# Patient Record
Sex: FEMALE | Race: BLACK OR AFRICAN AMERICAN | NOT HISPANIC OR LATINO
[De-identification: names, ages, dates, MRNs, and addresses within clinical notes are randomized per-mention and may not be internally consistent; named-entity substitution may affect disease eponyms.]

---

## 2017-09-15 ENCOUNTER — RESULT REVIEW (OUTPATIENT)
Age: 57
End: 2017-09-15

## 2018-01-09 ENCOUNTER — APPOINTMENT (OUTPATIENT)
Dept: GYNECOLOGIC ONCOLOGY | Facility: CLINIC | Age: 58
End: 2018-01-09
Payer: COMMERCIAL

## 2018-01-09 VITALS
BODY MASS INDEX: 47.09 KG/M2 | HEART RATE: 100 BPM | HEIGHT: 66 IN | DIASTOLIC BLOOD PRESSURE: 110 MMHG | WEIGHT: 293 LBS | SYSTOLIC BLOOD PRESSURE: 223 MMHG

## 2018-01-09 PROCEDURE — 58100 BIOPSY OF UTERUS LINING: CPT

## 2018-01-16 LAB — CORE LAB BIOPSY: NORMAL

## 2018-01-20 ENCOUNTER — APPOINTMENT (OUTPATIENT)
Dept: ULTRASOUND IMAGING | Facility: IMAGING CENTER | Age: 58
End: 2018-01-20
Payer: COMMERCIAL

## 2018-01-20 ENCOUNTER — OUTPATIENT (OUTPATIENT)
Dept: OUTPATIENT SERVICES | Facility: HOSPITAL | Age: 58
LOS: 1 days | End: 2018-01-20
Payer: COMMERCIAL

## 2018-01-20 DIAGNOSIS — Z98.89 OTHER SPECIFIED POSTPROCEDURAL STATES: Chronic | ICD-10-CM

## 2018-01-20 DIAGNOSIS — N85.02 ENDOMETRIAL INTRAEPITHELIAL NEOPLASIA [EIN]: ICD-10-CM

## 2018-01-20 PROCEDURE — 76830 TRANSVAGINAL US NON-OB: CPT

## 2018-01-20 PROCEDURE — 76856 US EXAM PELVIC COMPLETE: CPT

## 2018-01-20 PROCEDURE — 76856 US EXAM PELVIC COMPLETE: CPT | Mod: 26

## 2018-01-20 PROCEDURE — 76830 TRANSVAGINAL US NON-OB: CPT | Mod: 26

## 2019-04-30 ENCOUNTER — APPOINTMENT (OUTPATIENT)
Dept: PULMONOLOGY | Facility: CLINIC | Age: 59
End: 2019-04-30
Payer: COMMERCIAL

## 2019-04-30 VITALS
HEART RATE: 66 BPM | OXYGEN SATURATION: 97 % | DIASTOLIC BLOOD PRESSURE: 100 MMHG | BODY MASS INDEX: 47.09 KG/M2 | RESPIRATION RATE: 18 BRPM | WEIGHT: 293 LBS | SYSTOLIC BLOOD PRESSURE: 197 MMHG | HEIGHT: 66 IN | TEMPERATURE: 97 F

## 2019-04-30 DIAGNOSIS — R40.0 SOMNOLENCE: ICD-10-CM

## 2019-04-30 DIAGNOSIS — E66.01 MORBID (SEVERE) OBESITY DUE TO EXCESS CALORIES: ICD-10-CM

## 2019-04-30 PROCEDURE — 99204 OFFICE O/P NEW MOD 45 MIN: CPT

## 2019-04-30 NOTE — REVIEW OF SYSTEMS
[EDS: ESS=____] : daytime somnolence: ESS=[unfilled] [Fatigue] : fatigue [Recent Wt Loss (___ Lbs)] : recent [unfilled] ~Ulb weight loss [Snoring] : no snoring [Witnessed Apneas] : no witnessed apnea [A.M. Dry Mouth] : a.m. dry mouth [Obesity] : obesity [Diabetes] : diabetes  [A.M. Headache] : no headache present upon awakening [Leg Dysesthesias] : no leg dysesthesias [Sleep Paralysis] : no sleep paralysis [Hypnogogic Hallucinations] : no hypnogogic hallucinations [Hypnopompic Hallucinations] : no hypnopompic hallucinations [Heartburn] : no heartburn [Nocturia] : nocturia [Arthralgias] : arthralgias [Depression] : depression [Anxious] : anxious [Difficulty Initiating Sleep] : difficulty falling asleep [Difficulty Maintaining Sleep] : difficulty maintaining sleep [Lower Extremity Discomfort] : no lower extremity discomfort [Irresistible urge to move legs] : no irresistible urge to move legs because of lower extremity discomfort [LE discomfort relieved by movement] : lower extremity discomfort not relieved by movement [Late day/ Evening symptoms] : no late day/evening symptoms [Sleep Disturbances due to LE symptoms] : ~T no sleep disturbances due to lower extremity symptoms [Unusual Sleep Behavior] : no unusual sleep behavior [Hypersomnolence] : not sleeping much more than usual [Cataplexy] :  no cataplexy [Negative] : Cardiovascular

## 2019-04-30 NOTE — PHYSICAL EXAM
[Normal Appearance] : normal appearance [General Appearance - In No Acute Distress] : no acute distress [Normal Conjunctiva] : the conjunctiva exhibited no abnormalities [Normal Oropharynx] : normal oropharynx [Neck Appearance] : the appearance of the neck was normal [Jugular Venous Distention Increased] : there was no jugular-venous distention [] : no respiratory distress [Exaggerated Use Of Accessory Muscles For Inspiration] : no accessory muscle use [Abnormal Walk] : normal gait [Involuntary Movements] : no involuntary movements were seen [Nail Clubbing] : no clubbing of the fingernails [Cyanosis, Localized] : no localized cyanosis [Skin Color & Pigmentation] : normal skin color and pigmentation [No Focal Deficits] : no focal deficits [Oriented To Time, Place, And Person] : oriented to person, place, and time [Affect] : the affect was normal [Mood] : the mood was normal

## 2019-04-30 NOTE — HISTORY OF PRESENT ILLNESS
[Frequent Nocturnal Awakening] : frequent nocturnal awakening [Unintentional Sleep While Inactive] : unintentional sleep while inactive [Awakes Unrefreshed] : awakening unrefreshed [DIS] : DIS [DMS] : DMS [To Bed: ___] : ~he/she~ goes to bed at [unfilled] [Arises: ___] : arises at [unfilled] [Sleep Onset Latency: ___ minutes] : sleep onset latency of [unfilled] minutes reported [Nocturnal Awakenings: ___] : ~he/she~ typically has [unfilled] nocturnal awakenings [Daytime Sleep: ___] : daytime sleep: [unfilled] [FreeTextEntry1] : 58 year old woman here for evaluation of sleep problems.\par \par The patient states that she has had problems sleeping all of her life. 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 She states that she sleeps for about 4-5 hours a night and no more than 3 hours at a stretch. She was refered for evaluation of her sleep by her PMD as she has been having trouble controlling her BP. [Snoring] : no snoring [Witnessed Apneas] : no witnessed sleep apnea [Daytime Somnolence] : no daytime somnolence [Unintentional Sleep while Active] : no unintentional sleep while active [Awakes with Headache] : no headache upon awakening [Awakening With Dry Mouth] : no dry mouth upon awakening [Recent  Weight Gain] : no recent weight gain

## 2019-05-17 ENCOUNTER — APPOINTMENT (OUTPATIENT)
Dept: OBGYN | Facility: CLINIC | Age: 59
End: 2019-05-17

## 2019-05-17 ENCOUNTER — APPOINTMENT (OUTPATIENT)
Dept: GYNECOLOGIC ONCOLOGY | Facility: CLINIC | Age: 59
End: 2019-05-17
Payer: COMMERCIAL

## 2019-05-17 VITALS — HEIGHT: 66 IN | BODY MASS INDEX: 47.09 KG/M2 | WEIGHT: 293 LBS

## 2019-05-17 PROCEDURE — 58100 BIOPSY OF UTERUS LINING: CPT

## 2019-05-17 RX ORDER — CLONIDINE 0.3 MG/24H
0.3 PATCH, EXTENDED RELEASE TRANSDERMAL
Qty: 4 | Refills: 0 | Status: ACTIVE | COMMUNITY
Start: 2019-01-07

## 2019-05-17 RX ORDER — CHLORTHALIDONE 50 MG/1
50 TABLET ORAL
Qty: 30 | Refills: 0 | Status: ACTIVE | COMMUNITY
Start: 2018-11-05

## 2019-05-28 LAB — CORE LAB BIOPSY: NORMAL

## 2019-06-27 ENCOUNTER — OUTPATIENT (OUTPATIENT)
Dept: OUTPATIENT SERVICES | Facility: HOSPITAL | Age: 59
LOS: 1 days | End: 2019-06-27
Payer: COMMERCIAL

## 2019-06-27 ENCOUNTER — APPOINTMENT (OUTPATIENT)
Dept: SLEEP CENTER | Facility: CLINIC | Age: 59
End: 2019-06-27
Payer: COMMERCIAL

## 2019-06-27 ENCOUNTER — RESULT REVIEW (OUTPATIENT)
Age: 59
End: 2019-06-27

## 2019-06-27 DIAGNOSIS — Z98.89 OTHER SPECIFIED POSTPROCEDURAL STATES: Chronic | ICD-10-CM

## 2019-06-27 PROCEDURE — 95806 SLEEP STUDY UNATT&RESP EFFT: CPT

## 2019-06-27 PROCEDURE — 95806 SLEEP STUDY UNATT&RESP EFFT: CPT | Mod: 26

## 2019-07-02 DIAGNOSIS — G47.33 OBSTRUCTIVE SLEEP APNEA (ADULT) (PEDIATRIC): ICD-10-CM

## 2019-08-26 ENCOUNTER — APPOINTMENT (OUTPATIENT)
Dept: VASCULAR SURGERY | Facility: CLINIC | Age: 59
End: 2019-08-26

## 2019-10-07 ENCOUNTER — APPOINTMENT (OUTPATIENT)
Dept: VASCULAR SURGERY | Facility: CLINIC | Age: 59
End: 2019-10-07

## 2020-03-13 ENCOUNTER — APPOINTMENT (OUTPATIENT)
Dept: ORTHOPEDIC SURGERY | Facility: CLINIC | Age: 60
End: 2020-03-13

## 2020-03-18 ENCOUNTER — APPOINTMENT (OUTPATIENT)
Dept: ORTHOPEDIC SURGERY | Facility: CLINIC | Age: 60
End: 2020-03-18
Payer: COMMERCIAL

## 2020-03-18 VITALS — HEIGHT: 66 IN | BODY MASS INDEX: 47.09 KG/M2 | WEIGHT: 293 LBS

## 2020-03-18 DIAGNOSIS — M16.11 UNILATERAL PRIMARY OSTEOARTHRITIS, RIGHT HIP: ICD-10-CM

## 2020-03-18 PROCEDURE — 73502 X-RAY EXAM HIP UNI 2-3 VIEWS: CPT | Mod: RT

## 2020-03-18 PROCEDURE — 99204 OFFICE O/P NEW MOD 45 MIN: CPT

## 2020-03-18 RX ORDER — DICLOFENAC SODIUM 50 MG/1
50 TABLET, DELAYED RELEASE ORAL
Qty: 60 | Refills: 1 | Status: ACTIVE | COMMUNITY
Start: 2020-03-18 | End: 1900-01-01

## 2020-03-18 NOTE — PHYSICAL EXAM
[de-identified] : Physical Examination\par General: well nourished, in no acute distress, alert and oriented to person, place and time\par Psychiatric: normal mood and affect, no abnormal movements or speech patterns\par Eyes: vision intact - glasses\par Throat: no thyromegaly\par Lymph: no enlarged nodes, no lymphedema in extremity\par Respiratory: no wheezing, no shortness of breath with ambulation\par Cardiac: no cardiac leg swelling, 2+ peripheral pulses\par Neurology: normal gross sensation in extremities to light touch\par Abdomen: soft, non-tender, tympanic, no masses\par \par Musculoskeletal Examination\par Ambulation	+ antalgic gait, - assistive devices\par Significantly limited examination secondary to body habitus\par Hip			Right			Left\par General\par      Swelling/Deformity	normal			normal	\par      Skin			normal			normal\par      Erythema		-			-\par      Standing Alignment	neutral			neutral\par Range of Motion\par      Flexion		90			90\par      Abduction		10			10\par      Flex ER		15			25\par      Flex IR		5			5\par      Knee        		0 - 130			0 - 130\par Provocative Exam\par      Log Roll		+			-\par      Heel Strike		+			-\par      Fig 4			-			-\par      SLR			-			-\par Palpation\par      Public Symphysis	-			-\par      Groin   	 	-			-\par      Greater Trochanter	-			-\par      Piriformis		-			-\par      SI Joint		-			-\par Strength Examination/Atrophy\par      Hip Flexor		5+			5+\par      Quadriceps		5+			5+\par      Hamstring		5+			5+\par Sensation\par      Deep Peroneal        	normal			normal\par      Superficial Peroneal 	normal			normal\par      Sural  	 	normal			normal\par      Posterior Tibial        	normal			normal\par      Saphenous		normal			normal\par Pulse\par      DP			2+			2+\par  [de-identified] : 2 views of the affected RIGHT hip (AP and Frog Lateral)\par were ordered, taken and evaluated by myself today and \par demonstrate:\par [normal] acetabular morphology\par [normal] femoral head neck morphology\par Small acetabular osteophytes \par Mild to moderate medial asymmetric joint space loss\par [Spherical] femoral head with cyst

## 2020-03-18 NOTE — HISTORY OF PRESENT ILLNESS
[de-identified] : CC Right hip\par \par HPI 58 yo female presents with chronic onset of many months of activity related pain in the groin Right hip [without Injury]. The pain is worse, and rated a 6 out of 10, described as throbbing, [without radiation]. Rest makes the pain better and walking standing stairs makes the pain worse. The patient reports associated symptoms of pain. The patient - similar pain previously .\par \par \par Previous treatments include:\par Activity Modification	-\par Ice/Compression 	-\par NSAIDs  		-\par Physical Therapy 	-\par Cortisone Injection	-\par Surgery  		-\par \par Review of Systems is positive for the above musculoskeletal symptoms and is otherwise non-contributory for general, constitutional, psychiatric, neurologic, HEENT, cardiac, respiratory, gastrointestinal, reproductive, lymphatic, and dermatologic complaints.\par \par Billya

## 2020-03-18 NOTE — DISCUSSION/SUMMARY
[de-identified] : Right hip osteoarthritis\par \par The patient and I discussed the causes and progression of degenerative joint disease of the hip. Models, diagrams and drawings were used in the discussion. Treatment can include conservative non-operative management and surgical options. Conservative management includes weight loss, activity modification, physical therapy to improve motion and strength in the muscles around the hip and the body's core, PO NSAIDs, corticosteroid intra-articular injections. If the patient fails to improve with non-operative management, surgical management is possible. Depending upon the patient's age, BMI, activity level, degree and location of arthrosis different surgical options are possible including total joint arthroplasty.\par \par Physical therapy was prescribed for hip ROM exercises, strengthening exercises, hip abductor strengthening, lumbar core strengthening, modalities PRN, home exercises\par \par The patient was prescribed Diclofenac PO non-steroidal anti-inflammatory medication. 50mg tablets twice daily to be taken for at least 1-2 weeks in a row and then PRN afterwards. Risks and benefits were discussed and include but not limited to renal damage and GI ulceration and bleeding.  They were advised to take with food to limit stomach upset as well as warned to stop the medication if worsening gastric pain or dizziness or other side effects. Also to immediately stop the medication and seek appropriate medical attention if any severe stomach ache, gastritis, black/red vomit, black/red stools or any other medical concern.\par \par no csi due to dm status\par \par The patient verifies their understanding the the visit, diagnosis and plan. They agree with the treatment plan and will contact the office with any questions or problems.\par \par Follow up\par PRN

## 2020-08-03 PROBLEM — Z09 FOLLOW UP: Status: ACTIVE | Noted: 2020-08-03

## 2020-08-04 ENCOUNTER — APPOINTMENT (OUTPATIENT)
Dept: GYNECOLOGIC ONCOLOGY | Facility: CLINIC | Age: 60
End: 2020-08-04
Payer: COMMERCIAL

## 2020-08-04 DIAGNOSIS — N85.02 ENDOMETRIAL INTRAEPITHELIAL NEOPLASIA [EIN]: ICD-10-CM

## 2020-08-04 DIAGNOSIS — N85.01 BENIGN ENDOMETRIAL HYPERPLASIA: ICD-10-CM

## 2020-08-04 DIAGNOSIS — Z09 ENCOUNTER FOR FOLLOW-UP EXAMINATION AFTER COMPLETED TREATMENT FOR CONDITIONS OTHER THAN MALIGNANT NEOPLASM: ICD-10-CM

## 2020-09-15 ENCOUNTER — APPOINTMENT (OUTPATIENT)
Dept: GYNECOLOGIC ONCOLOGY | Facility: CLINIC | Age: 60
End: 2020-09-15
Payer: COMMERCIAL

## 2020-09-15 VITALS — DIASTOLIC BLOOD PRESSURE: 148 MMHG | SYSTOLIC BLOOD PRESSURE: 223 MMHG | HEIGHT: 66 IN | HEART RATE: 102 BPM

## 2020-09-15 PROCEDURE — XXXXX: CPT

## 2020-09-28 LAB — CORE LAB BIOPSY: NORMAL

## 2021-05-16 ENCOUNTER — EMERGENCY (EMERGENCY)
Facility: HOSPITAL | Age: 61
LOS: 1 days | Discharge: HOME | End: 2021-05-16
Attending: STUDENT IN AN ORGANIZED HEALTH CARE EDUCATION/TRAINING PROGRAM | Admitting: STUDENT IN AN ORGANIZED HEALTH CARE EDUCATION/TRAINING PROGRAM
Payer: COMMERCIAL

## 2021-05-16 ENCOUNTER — INPATIENT (INPATIENT)
Facility: HOSPITAL | Age: 61
LOS: 15 days | Discharge: NURSING, REHAB OR LONG-TERM CARE | End: 2021-06-01
Attending: INTERNAL MEDICINE | Admitting: INTERNAL MEDICINE
Payer: COMMERCIAL

## 2021-05-16 VITALS
TEMPERATURE: 97 F | RESPIRATION RATE: 18 BRPM | SYSTOLIC BLOOD PRESSURE: 217 MMHG | HEART RATE: 88 BPM | OXYGEN SATURATION: 97 % | DIASTOLIC BLOOD PRESSURE: 107 MMHG | HEIGHT: 66 IN

## 2021-05-16 VITALS
SYSTOLIC BLOOD PRESSURE: 257 MMHG | HEIGHT: 66 IN | OXYGEN SATURATION: 99 % | RESPIRATION RATE: 14 BRPM | DIASTOLIC BLOOD PRESSURE: 129 MMHG | HEART RATE: 92 BPM | TEMPERATURE: 98 F

## 2021-05-16 VITALS
HEART RATE: 82 BPM | OXYGEN SATURATION: 99 % | DIASTOLIC BLOOD PRESSURE: 98 MMHG | SYSTOLIC BLOOD PRESSURE: 213 MMHG | RESPIRATION RATE: 16 BRPM

## 2021-05-16 DIAGNOSIS — Z98.89 OTHER SPECIFIED POSTPROCEDURAL STATES: Chronic | ICD-10-CM

## 2021-05-16 LAB
ALBUMIN SERPL ELPH-MCNC: 4 G/DL — SIGNIFICANT CHANGE UP (ref 3.3–5)
ALP SERPL-CCNC: 69 U/L — SIGNIFICANT CHANGE UP (ref 40–120)
ALT FLD-CCNC: 12 U/L — SIGNIFICANT CHANGE UP (ref 4–33)
ANION GAP SERPL CALC-SCNC: 10 MMOL/L — SIGNIFICANT CHANGE UP (ref 7–14)
APTT BLD: 37.4 SEC — HIGH (ref 27–36.3)
AST SERPL-CCNC: 18 U/L — SIGNIFICANT CHANGE UP (ref 4–32)
BASOPHILS # BLD AUTO: 0.05 K/UL — SIGNIFICANT CHANGE UP (ref 0–0.2)
BASOPHILS NFR BLD AUTO: 0.8 % — SIGNIFICANT CHANGE UP (ref 0–2)
BILIRUB SERPL-MCNC: 0.8 MG/DL — SIGNIFICANT CHANGE UP (ref 0.2–1.2)
BLOOD GAS VENOUS COMPREHENSIVE RESULT: SIGNIFICANT CHANGE UP
BUN SERPL-MCNC: 22 MG/DL — SIGNIFICANT CHANGE UP (ref 7–23)
CALCIUM SERPL-MCNC: 10 MG/DL — SIGNIFICANT CHANGE UP (ref 8.4–10.5)
CHLORIDE SERPL-SCNC: 103 MMOL/L — SIGNIFICANT CHANGE UP (ref 98–107)
CO2 SERPL-SCNC: 26 MMOL/L — SIGNIFICANT CHANGE UP (ref 22–31)
CREAT SERPL-MCNC: 1.21 MG/DL — SIGNIFICANT CHANGE UP (ref 0.5–1.3)
EOSINOPHIL # BLD AUTO: 0.12 K/UL — SIGNIFICANT CHANGE UP (ref 0–0.5)
EOSINOPHIL NFR BLD AUTO: 1.8 % — SIGNIFICANT CHANGE UP (ref 0–6)
GLUCOSE SERPL-MCNC: 129 MG/DL — HIGH (ref 70–99)
HCT VFR BLD CALC: 44 % — SIGNIFICANT CHANGE UP (ref 34.5–45)
HGB BLD-MCNC: 14.3 G/DL — SIGNIFICANT CHANGE UP (ref 11.5–15.5)
IANC: 4.46 K/UL — SIGNIFICANT CHANGE UP (ref 1.5–8.5)
IMM GRANULOCYTES NFR BLD AUTO: 0.3 % — SIGNIFICANT CHANGE UP (ref 0–1.5)
INR BLD: 1.13 RATIO — SIGNIFICANT CHANGE UP (ref 0.88–1.16)
LYMPHOCYTES # BLD AUTO: 1.45 K/UL — SIGNIFICANT CHANGE UP (ref 1–3.3)
LYMPHOCYTES # BLD AUTO: 22.1 % — SIGNIFICANT CHANGE UP (ref 13–44)
MCHC RBC-ENTMCNC: 28 PG — SIGNIFICANT CHANGE UP (ref 27–34)
MCHC RBC-ENTMCNC: 32.5 GM/DL — SIGNIFICANT CHANGE UP (ref 32–36)
MCV RBC AUTO: 86.1 FL — SIGNIFICANT CHANGE UP (ref 80–100)
MONOCYTES # BLD AUTO: 0.45 K/UL — SIGNIFICANT CHANGE UP (ref 0–0.9)
MONOCYTES NFR BLD AUTO: 6.9 % — SIGNIFICANT CHANGE UP (ref 2–14)
NEUTROPHILS # BLD AUTO: 4.46 K/UL — SIGNIFICANT CHANGE UP (ref 1.8–7.4)
NEUTROPHILS NFR BLD AUTO: 68.1 % — SIGNIFICANT CHANGE UP (ref 43–77)
NRBC # BLD: 0 /100 WBCS — SIGNIFICANT CHANGE UP
NRBC # FLD: 0 K/UL — SIGNIFICANT CHANGE UP
PLATELET # BLD AUTO: 286 K/UL — SIGNIFICANT CHANGE UP (ref 150–400)
POTASSIUM SERPL-MCNC: 4.1 MMOL/L — SIGNIFICANT CHANGE UP (ref 3.5–5.3)
POTASSIUM SERPL-SCNC: 4.1 MMOL/L — SIGNIFICANT CHANGE UP (ref 3.5–5.3)
PROT SERPL-MCNC: 6.6 G/DL — SIGNIFICANT CHANGE UP (ref 6–8.3)
PROTHROM AB SERPL-ACNC: 12.8 SEC — SIGNIFICANT CHANGE UP (ref 10.6–13.6)
RBC # BLD: 5.11 M/UL — SIGNIFICANT CHANGE UP (ref 3.8–5.2)
RBC # FLD: 15.1 % — HIGH (ref 10.3–14.5)
SARS-COV-2 RNA SPEC QL NAA+PROBE: SIGNIFICANT CHANGE UP
SODIUM SERPL-SCNC: 139 MMOL/L — SIGNIFICANT CHANGE UP (ref 135–145)
TROPONIN T, HIGH SENSITIVITY RESULT: 22 NG/L — SIGNIFICANT CHANGE UP
WBC # BLD: 6.55 K/UL — SIGNIFICANT CHANGE UP (ref 3.8–10.5)
WBC # FLD AUTO: 6.55 K/UL — SIGNIFICANT CHANGE UP (ref 3.8–10.5)

## 2021-05-16 PROCEDURE — 99284 EMERGENCY DEPT VISIT MOD MDM: CPT | Mod: 25

## 2021-05-16 PROCEDURE — 73700 CT LOWER EXTREMITY W/O DYE: CPT | Mod: 26,LT

## 2021-05-16 PROCEDURE — 73564 X-RAY EXAM KNEE 4 OR MORE: CPT | Mod: 26,LT

## 2021-05-16 PROCEDURE — 93010 ELECTROCARDIOGRAM REPORT: CPT

## 2021-05-16 PROCEDURE — 70498 CT ANGIOGRAPHY NECK: CPT | Mod: 26

## 2021-05-16 PROCEDURE — 70496 CT ANGIOGRAPHY HEAD: CPT | Mod: 26

## 2021-05-16 PROCEDURE — 73502 X-RAY EXAM HIP UNI 2-3 VIEWS: CPT | Mod: 26,LT

## 2021-05-16 PROCEDURE — 99285 EMERGENCY DEPT VISIT HI MDM: CPT

## 2021-05-16 RX ORDER — HYDRALAZINE HCL 50 MG
50 TABLET ORAL ONCE
Refills: 0 | Status: COMPLETED | OUTPATIENT
Start: 2021-05-16 | End: 2021-05-16

## 2021-05-16 RX ORDER — ACETAMINOPHEN 500 MG
975 TABLET ORAL ONCE
Refills: 0 | Status: COMPLETED | OUTPATIENT
Start: 2021-05-16 | End: 2021-05-16

## 2021-05-16 RX ORDER — IBUPROFEN 200 MG
600 TABLET ORAL ONCE
Refills: 0 | Status: COMPLETED | OUTPATIENT
Start: 2021-05-16 | End: 2021-05-16

## 2021-05-16 RX ORDER — HYDRALAZINE HCL 50 MG
10 TABLET ORAL ONCE
Refills: 0 | Status: DISCONTINUED | OUTPATIENT
Start: 2021-05-16 | End: 2021-05-16

## 2021-05-16 RX ADMIN — Medication 0.1 MILLIGRAM(S): at 13:25

## 2021-05-16 RX ADMIN — Medication 50 MILLIGRAM(S): at 11:53

## 2021-05-16 RX ADMIN — Medication 50 MILLIGRAM(S): at 23:16

## 2021-05-16 RX ADMIN — Medication 0.2 MILLIGRAM(S): at 11:53

## 2021-05-16 RX ADMIN — Medication 600 MILLIGRAM(S): at 14:43

## 2021-05-16 RX ADMIN — Medication 975 MILLIGRAM(S): at 14:42

## 2021-05-16 NOTE — CONSULT NOTE ADULT - ATTENDING COMMENTS
60 year old moribdily obese RH  AA female with a past medical history of morbid obesity, HTN, DM, CAD who presented to the ED for the 2nd time today because of L leg weakness. r/o stroke but lower suspicion. no incontinence. 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 severe L hip atrhosis, CTA H/N unremarkable.   - Aspirin 81mg PO daily and Atorvastatin 40, titrate to LDL<70 for secondary stroke prevention.   - MRI brain w/o contrast if able to perform due to patient's weight  -  MRI C-spine w/o contrast  - TTE and telemetry   - HbA1C and Lipid Panel  - Telemonitoring;  Neurochecks and Vital signs per unit protocol  - pain management  Erik Foster MD  Vascular Neurology  Office: [phone]

## 2021-05-16 NOTE — ED PROVIDER NOTE - NSFOLLOWUPINSTRUCTIONS_ED_ALL_ED_FT
Follow up with your PCP in 24-48 hours.   Call orthopedics to make a follow up appointment.   May take Tylenol and Motrin as directed on the bottle for pain control.   Return to the ER if you develop any new or worsening symptoms such as fever, chest pain, shortness of breath, numbness, weakness, abdominal pain, nausea, vomiting, or visual changes.

## 2021-05-16 NOTE — ED PROVIDER NOTE - CLINICAL SUMMARY MEDICAL DECISION MAKING FREE TEXT BOX
60F with PMH including obesity, HTN, DM, CAD presents to the ER with several months of left leg "weakness" x several months worsening over the past two days. No numbness, fever, back pain. Escalate care given this is 2nd visit, obtain labwork, CTH/CT hip, r/o stroke occult fracture, likely admission since difficulty walking

## 2021-05-16 NOTE — ED ADULT NURSE NOTE - CHIEF COMPLAINT QUOTE
pt coming from home, pt was seen treated and discharged from [facility] ED today for left knee pain.  pt is back in ED with c/o left knee pain.

## 2021-05-16 NOTE — CONSULT NOTE ADULT - ASSESSMENT
Tiffanie Lanier is a 60 year old RH female with a past medical history of morbid obesity, HTN, DM, CAD who presented to the ED for the 2nd time today because of L leg weakness.     Impression: L hemiparesis with hemisensory deficits in the setting of likely R brain dysfunction vs L spinal cord dysfunction. If stroke then mechanism is unclear.    Recs:  Meds    [] would start Aspirin 81mg  [] Atorvastatin 40, titrate to LDL<70  [] DVT prophylaxis    Imaging  [] MRI brain w/o contrast if able to perform due to patient's weight  [] MRI C-spine w/o contrast  [] TTE and telemetry     Labs  [] HbA1C and Lipid Panel    Other  [] Telemonitoring;  Neurochecks and Vital signs per unit protocol  [] Normotension   [] BGM goals 140-180  [] PT/OT evaluation  [] NPO until clears Dysphagia screen, otherwise Swallow evaluation    Case to be discussed with neurology attending, Dr. Coley.    Tiffanie Lanier is a 60 year old RH female with a past medical history of morbid obesity, HTN, DM, CAD who presented to the ED for the 2nd time today because of L leg weakness.     Impression: L hemiparesis with hemisensory deficits in the setting of likely R brain dysfunction vs L spinal cord dysfunction. If stroke then mechanism is unclear.    Recs:  Meds    [] would start Aspirin 81mg  [] Atorvastatin 40, titrate to LDL<70  [] DVT prophylaxis    Imaging  [] Head CT and CTA head and neck  [] MRI brain w/o contrast if able to perform due to patient's weight  [] MRI C-spine w/o contrast  [] TTE and telemetry     Labs  [] HbA1C and Lipid Panel    Other  [] Telemonitoring;  Neurochecks and Vital signs per unit protocol  [] Normotension   [] BGM goals 140-180  [] PT/OT evaluation  [] NPO until clears Dysphagia screen, otherwise Swallow evaluation    Case to be discussed with neurology attending, Dr. Coley.    Tiffanie Lanier is a 60 year old RH female with a past medical history of morbid obesity, HTN, DM, CAD who presented to the ED for the 2nd time today because of L leg weakness.     Impression: L hemiparesis with hemisensory deficits in the setting of likely R brain dysfunction vs L spinal cord dysfunction. If stroke then mechanism is unclear.    Recs:  Meds    [] would start Aspirin 81mg  [] Atorvastatin 40, titrate to LDL<70  [] DVT prophylaxis    Imaging  [] Head CT and CTA head and neck  [] MRI brain w/o contrast if able to perform due to patient's weight  [] MRI C-spine w/o contrast  [] TTE and telemetry     Labs  [] HbA1C and Lipid Panel    Other  [] Telemonitoring;  Neurochecks and Vital signs per unit protocol  [] Normotension   [] BGM goals 140-180  [] PT/OT evaluation  [] NPO until clears Dysphagia screen, otherwise Swallow evaluation    Case to be discussed with neurology attending, Dr. Foster.

## 2021-05-16 NOTE — ED PROVIDER NOTE - NS ED ROS FT
ROS:  GENERAL: No fever, no chills  EYES: no change in vision  HEENT: no trouble swallowing, no trouble speaking  CARDIAC: no chest pain  PULMONARY: no cough, no shortness of breath  GI: no abdominal pain, no nausea, no vomiting, no diarrhea, no constipation  : No dysuria, no frequency, no change in appearance, or odor of urine  SKIN: no rashes  NEURO: no headache, no numbness   MSK: +left leg weakness.     Sergey Singh PGY3

## 2021-05-16 NOTE — ED PROVIDER NOTE - PMH
Endometrial hyperplasia    Hypertension    Morbid obesity with BMI of 60.0-69.9, adult    Type 2 diabetes mellitus

## 2021-05-16 NOTE — ED ADULT TRIAGE NOTE - CHIEF COMPLAINT QUOTE
Pt A&Ox3 c/c left lower leg pain starting 4pm yesterday while sitting in car. bilateral leg redness and nonpitting edema present. History HTN taking Hydralazine 50mg recently increased to 3xper day. Pt denies SOB, headache, or Chest pain. pt states she does not check BP at home, last pressure while at MD office '200/100s" 2 weeks ago as per pt.

## 2021-05-16 NOTE — ED PROVIDER NOTE - NSFOLLOWUPCLINICS_GEN_ALL_ED_FT
[facility] Orthopedic Surgery  Orthopedic Surgery  [address], 3rd & 4th [place] [address]  Phone: [phone]  Fax:

## 2021-05-16 NOTE — ED PROVIDER NOTE - PHYSICAL EXAMINATION
Gen: AAOx3, non-toxic  Head: NCAT  HEENT: EOMI, oral mucosa moist, normal conjunctiva  Lung: CTAB, no respiratory distress, no wheezes/rhonchi/rales B/L, speaking in full sentences  CV: RRR, no murmurs, rubs or gallops, DP pulses intact and equal bilaterally  Abd: soft, NTND  MSK: +3/5 on hip flexing on the left, 5/5 on the right. 5/5 strength and normal ROM at knee joints and below bilaterally, no pelvic instability or tenderness, no estefani TTP, no midline spinal TTP, no visible deformities  Neuro: No focal sensory or motor deficits, normal CN exam   Skin: no redness or swelling of the lower extremities bilaterally (despite triage note)   Psych: normal affect.     Sergey Singh PGY3

## 2021-05-16 NOTE — ED ADULT NURSE NOTE - OBJECTIVE STATEMENT
Pt. A&Ox4, c/o left leg weakness. States she's been having difficulty moving it. Denies any pain or numbness to LE. MD at bedside for eval. Meds given as ordered. Pt. noted to be hypertensive. states she hasn't taken her BP meds since yesterday. Denies cp, sob, dizziness or fevers.

## 2021-05-16 NOTE — ED PROVIDER NOTE - OBJECTIVE STATEMENT
60F with PMH including obesity, HTN, DM, CAD presents to the ER with several months of left leg "weakness". States she feels like her left leg is going to give out sometimes when she stands or walks on it. Reports that it has gotten worse over the past two days, which prompted her to come in. Denies any other symptoms including leg pain (despite triage note), back pain, numbness, HA, chest pain, SOB, abd pain, N/V/D, or fever. BP noted to be elevated, pt states its usually in the 200s and that she has not taken any of her medication yet today (Hydralazine 50 TID, Clonidine).

## 2021-05-16 NOTE — ED PROVIDER NOTE - PHYSICAL EXAMINATION
[Const] morbidly obese, well-appearing, resting comfortably, no acute distress  [HEENT] PERRL, EOMI, moist mucus membranes  [Neck] Supple, trachea midline  [CV] +S1/S2, no m/r/g appreciated  [Lungs] Clear to auscultations bilaterally, no adventitious lung sounds  [Abd] soft, non-tender, nondistended in all 4 quadrants  [MSK] pelvis stable, no UE/LE TTP, L leg slight weakness compared to right (unable to lift against gravity)  [Skin] warm, dry, well-perfused  [Neuro] A&Ox3, CN II-XII intact

## 2021-05-16 NOTE — ED ADULT NURSE NOTE - NSFALLRSKUNASSIST_ED_ALL_ED
no PAST SURGICAL HISTORY:  H/O bilateral breast biopsy November 2018 and December 2018    H/O total thyroidectomy     S/P angioplasty with stent drug eluting stent in first diagonal on 2/21/19    S/P dilation and curettage TOP x2

## 2021-05-16 NOTE — ED PROVIDER NOTE - CLINICAL SUMMARY MEDICAL DECISION MAKING FREE TEXT BOX
60F with PMH including obesity, HTN, DM, CAD presents to the ER with several months of left leg "weakness" x several months worsening over the past two days.     MSK: +3/5 on hip flexing on the left, 5/5 on the right. 5/5 strength and normal ROM at knee joints and below bilaterally, no pelvic instability or tenderness, no estefani TTP, no midline spinal TTP, no visible deformities 60F with PMH including obesity, HTN, DM, CAD presents to the ER with several months of left leg "weakness" x several months worsening over the past two days. No numbness, fever, back pain. Pt well appearing, ambulatory with cane, exam sig for mild weakness on flexing left hip, otherwise unremarkable. Exam/history not c/w CVA or cord compression. Will assess for fracture with XR then likely d/c to f/u with ortho.

## 2021-05-16 NOTE — ED PROVIDER NOTE - ATTENDING CONTRIBUTION TO CARE
60F with pmh morbid obesity, HTN, DM, CAD presenting with several months to an year of left leg weakness with past 2 days feeling particularly worse. States since retiring has been more sedentary and moving around less. Denies any inciting events or trauma. States is still able to get around slowly with a cane. Otherwise noted in triage vitals with hypertension. States she has had poorly controlled HTN for months with her BP always in the 200s per patient with close pcp follow for medication adjustment. Currently on hydralazine and clonidine which she did not take this morning. Denies fever, chills, cp, sob, nausea, vomiting, diarrhea, headache, numbness, changes in vision.    GEN: NAD, awake, well appearing  HEENT: NCAT, MMM, normal conjunctiva, perrl  CHEST/LUNGS: Non-tachypneic, CTAB, bilateral breath sounds  CARDIAC: Non-tachycardic, s1s2, normal perfusion, no peripheral edema, 2+ pulses x 4 extremities, no calf tenderness  ABDOMEN: Soft, NTND, No rebound/guarding  MSK: FROM of b/l legs, No joint tenderness, no gross deformity of extremities  SKIN: No rashes, no petechiae, no vesicles  NEURO: CN grossly intact, normal coordination, no focal sensory deficits. Left hip flexion weaker than right  PSYCH: Alert, appropriate, cooperative     Patient presenting with chronic left leg weakness. Likely 2/2 morbid obesity, deconditioning and other chronic pathology. Otherwise with uncontrolled HTN with close PCP followup for medication adjustments. Will provide missed HTN doses, screening XR of left hip with recommendation for PCP followup and physical therapy.

## 2021-05-16 NOTE — ED PROVIDER NOTE - OBJECTIVE STATEMENT
60F with PMH including obesity, HTN, DM, CAD presents to the ER 2nd time today, recently discharged a few days ago, here because symptoms have not improved and she feels it is continually difficult to walk. Comes in with several months of left leg "weakness". At this time, denies any pain, atraumatic, no fall or injury, denies headache, visual changes, slurred speech, difficulty forming words, difficulty eating/drinking, facial droop, vertigo or nausea/vomiting.     Agree with prior chart note from earlier today: States she feels like her left leg is going to give out sometimes when she stands or walks on it. Reports that it has gotten worse over the past two days, which prompted her to come in. Denies any other symptoms including leg pain (despite triage note), back pain, BP noted to be elevated, pt states its usually in the 200s. 60F with PMH including obesity, HTN, DM, CAD presents to the ER 2nd time today, recently discharged a few days ago, here because symptoms have not improved and she feels it is continually difficult to walk. Comes in with several months of left leg "weakness". At this time, denies any pain, atraumatic, no fall or injury, denies headache, visual changes, slurred speech, difficulty forming words, difficulty eating/drinking, facial droop, vertigo or nausea/vomiting.     Agree with prior chart note from earlier today: States she feels like her left leg is going to give out sometimes when she stands or walks on it. Reports that it has gotten worse over the past two days, which prompted her to come in. Denies any other symptoms including leg pain (despite triage note), back pain, BP noted to be elevated, pt states its usually in the 200s.    Rachel: [phone]

## 2021-05-16 NOTE — ED PROVIDER NOTE - ATTENDING CONTRIBUTION TO CARE
60F obesity HTN CAD p/w few months of LLE weakness, feels it's going to given out, worse over 2 days, atraumatic.  On exam HTN but otherwise normal.  Able to flex hips and knees.  Pt does not endorse pain now.  Pt able to lift RLE.  Unable to lift left leg.  Pt was seen here a few hours ago for similar.  Xrays were done, no fx.  No other deficits.  CVA vs spinal radiculopathy vs other.  Pt does endorse some slurred speech as well (not evident here) and blurry vision (has been going on for months, plans to f/u with ophtho).  Pt morbidly obese limiting workup, will check CTA H/N and L hip given proximal L hip weakness isolated.  Given can't walk will need admission.  VS:  unremarkable except HTN    GEN - NAD;  malaise;   A+O x3  Morbid obesity  HEAD - NC/AT     ENT - PEERL, EOMI, mucous membranes    moist , no discharge      NECK: Neck supple, non-tender without lymphadenopathy, no masses, no JVD  PULM - CTA b/l,  symmetric breath sounds  COR -  normal heart sounds    ABD - , ND, NT, soft,  BACK - no CVA tenderness, nontender spine     EXTREMS - no edema, no deformity, warm and well perfused    SKIN - no rash    or bruising      NEUROLOGIC - alert, face symmetric, speech fluent, sensation nl, motor no focal deficit except unable to lift L hip off the bed but distal foot plantar and dorsiflexion intact.  Sensation intact.

## 2021-05-16 NOTE — ED ADULT NURSE NOTE - OBJECTIVE STATEMENT
Pt received to room 14 AAO x 3 c/o left lower extremity weakness that is ongoing but worse the past few days. Pt denies pain, seen and dc'd this afternoon for same complaint with negative Xray. Pt states she could not make it up the steps and denies fall. Pt breathing with ease on room air and awaiting MD barnes.

## 2021-05-16 NOTE — CONSULT NOTE ADULT - SUBJECTIVE AND OBJECTIVE BOX
HPI:  Tiffanie Lanier is a 60 year old RH female with a past medical history of morbid obesity, HTN, DM, CAD who presented to the ED for the 2nd time today because of L leg weakness. At baseline, the patient is ambulatory without assistive devices and is alert and oriented to all modalities. Stated that several months ago she began to have L leg weakness which she stated came on rather suddenly when she noted that she would have to lift her L leg into bed but was otherwise able to walk on it. She denied tripping over her feet, falls, clumsiness, dragging her leg at that time. She notes that she feels that there was an "issue with my bone" but denied any pain in the leg, back pain, numbness, tingling. Over the last 2 days, she noted that she was not able  to walk as well as she did before which was a sudden and abrupt change. She started using her brother's cane and felt very weak on her leg to the point that she was unable to ambulate very far before resting. Continued to deny any pain. She denied any issues with her upper extremities, R leg, headaches, dizziness, lightheadedness, visual changes, auditory changes, dysphagia, dysarthria, neck pain. 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   Denies taking any antiplatelet or anticoagulant medications. States she only takes HTN and DM medications.     (Stroke only)  NIHSS: 3  MRS: 1      REVIEW OF SYSTEMS    A 10-system ROS was performed and is negative except for those items noted above and/or in the HPI.    PAST MEDICAL & SURGICAL HISTORY:  Hypertension    Morbid obesity with BMI of 60.0-69.9, adult    Type 2 diabetes mellitus    Endometrial hyperplasia    History of  section      FAMILY HISTORY:  Family history of stroke (Mother)      SOCIAL HISTORY:   Occupation: former   Lives with: family    ALLERGIES/INTOLERANCES:  Allergies  No Known Allergies    Intolerances    VITALS & EXAMINATION:  Vital Signs Last 24 Hrs  T(C): 36.3 (16 May 2021 19:30), Max: 36.4 (16 May 2021 10:53)  T(F): 97.3 (16 May 2021 19:30), Max: 97.6 (16 May 2021 10:53)  HR: 61 (16 May 2021 19:30) (61 - 92)  BP: 240/108 (16 May 2021 19:30) (213/98 - 257/129)  BP(mean): --  RR: 20 (16 May 2021 19:30) (14 - 20)  SpO2: 99% (16 May 2021 19:30) (97% - 99%)    General:  Constitutional: Obese Female, appears stated age, in no apparent distress including pain  Head: Normocephalic & atraumatic.    Neurological (>12):  MS: Awake, alert, oriented to person, place, situation, time. Normal affect. Follows all commands.    Language: Speech is clear, fluent with good repetition & comprehension.    CNs: PERRLA (R = 3mm, L = 3mm). VF intact in all 4 quadrants, EOMI no nystagmus. Some reduced sensation to pinprick on the L side of the face, intact to LT throughout, well developed masseter muscles b/l. No facial asymmetry b/l, full eye closure strength b/l. Symmetric palate elevation in midline. Shoulder shrug intact b/l. Tongue midline, normal movements, no atrophy.    Motor: Normal muscle bulk & tone. No noticeable tremor or seizure. Noted L arm drift.                 Deltoid	Biceps	Triceps	   R	5	5	5	5  L	4+	4+	4+	4+	    	H-Flex	H-Ext	H-ABd	H-ADd	K-Flex	K-Ext	D-Flex	P-Flex  R	5	5	5	5	5	5	5	5 	   L	4-	4-	4	4	4+	4-	5	4+	     Sensation: Reduced sensation to pinprick in the distal L leg and somewhat on the lateral thigh. Reduced sensation to pinprick on the distal L arm. Intact to vibratory sense and proprioception throughout.    Reflexes:              Biceps(C5)       BR(C6)     Triceps(C7)               Patellar(L4)    Achilles(S1)    Plantar Resp  R	2	          2	             2		        0		    1		Mute   L	2	          2	             2		        0		    1		Mute     Coordination: No dysmetria to FTN    Gait: Deferred    LABORATORY:  CBC                       14.3   6.55  )-----------( 286      ( 16 May 2021 18:56 )             44.0     Chem 05-16    139  |  103  |  22  ----------------------------<  129<H>  4.1   |  26  |  1.21    Ca    10.0      16 May 2021 18:56    TPro  6.6  /  Alb  4.0  /  TBili  0.8  /  DBili  x   /  AST  18  /  ALT  12  /  AlkPhos  69  05-16    LFTs LIVER FUNCTIONS - ( 16 May 2021 18:56 )  Alb: 4.0 g/dL / Pro: 6.6 g/dL / ALK PHOS: 69 U/L / ALT: 12 U/L / AST: 18 U/L / GGT: x           Coagulopathy PT/INR - ( 16 May 2021 18:56 )   PT: 12.8 sec;   INR: 1.13 ratio         PTT - ( 16 May 2021 18:56 )  PTT:37.4 sec      STUDIES & IMAGING:    Radiology (XR, CT, MR, U/S, TTE/KORI):

## 2021-05-16 NOTE — ED ADULT TRIAGE NOTE - CHIEF COMPLAINT QUOTE
ATTENDING ASSESSMENT AND PLAN:       1. Bilateral perioperative ischemic optic neuropathy    Gomez Turk is a pleasant 57 year old  or  male who presents to my neuro-ophthalmology clinic today for follow of his bilateral perioperative ischemic optic neuropathy.     Please refer to my initial note dated 3/10/17 for details.     - he underwent uncomplicated femoral popliteal bypass surgery, big toe amputation on 2/28/17   - noticed vision loss initially on 3/8/17 with the right eye.  Had bilateral severe optic neuropathy last visit (worse in the right eye than in the left eye)  - noticed progressive vision loss in the left eye acutely on 3/13/17 leading to urgent visit with Dr. Romaine Fraga.  - Repeat MRI orbits obtained - no significant visual pathway pathology - no changes from first MRI  - repeat CBC looking for anemia that may have precipitated worsening vision showed elevated white count (35,000 with left shift) and thrombocytosis  - patient was called immediately upon receipt of CBC results and told to call his primary care physician for check to look for infection   - patient ultimately found to have acute gangrenous cholecystitis and is now status post laparoscopic cholecystectomy 3/18/17.   - blood pressure was running little low- unclear whether he may have had early sepsis  - No further decrease in vision since last visit (did not have subjective progression in vision loss since before cholecystectomy surgery)    - In summary, he initially presented with bilateral perioperative ischemic optic neuropathy (right greater than left), and again noticed worsening of his vision of the left eye 2 days later.    - Progression in the left eye may have been secondary to early sepsis due to his gangrenous cholecystitis.   - he denies being on any blood pressure medications since his initial hospitalization for the popliteal bypass.    - His testing today revealed mild improvement in his visual  acuity in both eyes.  - OCT RNFL shows decreased retinal nerve fiber layer swelling in both eyes likely indicative of early atrophy.  - Visual field testing shows global depression in both eyes (compared to 3/10/17 visit in the right eye is slightly improved and in the left eye is worse)  - His nerves now look pale on examination indicative of permanent vision loss and optic atrophy setting in.  - We again discussed the lack of treatment for ischemic optic neuropathy- aside from treating potential vasculopathic risk factors (better diabetes mellitus control, avoiding hypotension or hypertension, avoiding anemia, etc) and that likely his vision will remain at this level.   - we will send him to low vision occupational therapy       - return to clinic in 1 month.   - I would still recommend avoiding any elective surgeries that require general anesthesia given that his optic nerve heads are still swollen and in theory he may be more susceptible to additional perioperative ischemic optic neuropathy during this window.  Once optic nerve head swelling resolved then will notify vascular surgeon.             Complete documentation of historical and exam elements from today's encounter can be found in the full encounter summary report (not reduplicated in this progress note).  I personally obtained the chief complaint(s) and history of present illness.  I confirmed and edited as necessary the review of systems, past medical/surgical history, family history, social history, and examination findings as documented by others; and I examined the patient myself.  I personally reviewed the relevant tests, images, and reports as documented above.  I formulated and edited as necessary the assessment and plan and discussed the findings and management plan with the patient and family   Siddhartha Mclaughlin MD  9:29 AM  3/29/2017           pt coming from home, pt was seen treated and discharged from [facility] ED today for left knee pain.  pt is back in ED with c/o left knee pain.

## 2021-05-17 DIAGNOSIS — E66.01 MORBID (SEVERE) OBESITY DUE TO EXCESS CALORIES: ICD-10-CM

## 2021-05-17 DIAGNOSIS — E11.9 TYPE 2 DIABETES MELLITUS WITHOUT COMPLICATIONS: ICD-10-CM

## 2021-05-17 DIAGNOSIS — I10 ESSENTIAL (PRIMARY) HYPERTENSION: ICD-10-CM

## 2021-05-17 DIAGNOSIS — Z29.9 ENCOUNTER FOR PROPHYLACTIC MEASURES, UNSPECIFIED: ICD-10-CM

## 2021-05-17 DIAGNOSIS — R29.898 OTHER SYMPTOMS AND SIGNS INVOLVING THE MUSCULOSKELETAL SYSTEM: ICD-10-CM

## 2021-05-17 DIAGNOSIS — I16.0 HYPERTENSIVE URGENCY: ICD-10-CM

## 2021-05-17 LAB
ALBUMIN SERPL ELPH-MCNC: 3.7 G/DL — SIGNIFICANT CHANGE UP (ref 3.3–5)
ALP SERPL-CCNC: 68 U/L — SIGNIFICANT CHANGE UP (ref 40–120)
ALT FLD-CCNC: 12 U/L — SIGNIFICANT CHANGE UP (ref 4–33)
ANION GAP SERPL CALC-SCNC: 14 MMOL/L — SIGNIFICANT CHANGE UP (ref 7–14)
APPEARANCE UR: CLEAR — SIGNIFICANT CHANGE UP
AST SERPL-CCNC: 18 U/L — SIGNIFICANT CHANGE UP (ref 4–32)
BACTERIA # UR AUTO: ABNORMAL
BILIRUB DIRECT SERPL-MCNC: <0.2 MG/DL — SIGNIFICANT CHANGE UP (ref 0–0.2)
BILIRUB INDIRECT FLD-MCNC: >0.7 MG/DL — SIGNIFICANT CHANGE UP (ref 0–1)
BILIRUB SERPL-MCNC: 0.9 MG/DL — SIGNIFICANT CHANGE UP (ref 0.2–1.2)
BILIRUB UR-MCNC: NEGATIVE — SIGNIFICANT CHANGE UP
BUN SERPL-MCNC: 18 MG/DL — SIGNIFICANT CHANGE UP (ref 7–23)
CALCIUM SERPL-MCNC: 9.7 MG/DL — SIGNIFICANT CHANGE UP (ref 8.4–10.5)
CHLORIDE SERPL-SCNC: 104 MMOL/L — SIGNIFICANT CHANGE UP (ref 98–107)
CO2 SERPL-SCNC: 21 MMOL/L — LOW (ref 22–31)
COLOR SPEC: SIGNIFICANT CHANGE UP
COMMENT - URINE: SIGNIFICANT CHANGE UP
CREAT SERPL-MCNC: 0.97 MG/DL — SIGNIFICANT CHANGE UP (ref 0.5–1.3)
DIFF PNL FLD: NEGATIVE — SIGNIFICANT CHANGE UP
EPI CELLS # UR: SIGNIFICANT CHANGE UP
GLUCOSE SERPL-MCNC: 114 MG/DL — HIGH (ref 70–99)
GLUCOSE UR QL: ABNORMAL
HCT VFR BLD CALC: 45.9 % — HIGH (ref 34.5–45)
HGB BLD-MCNC: 14.9 G/DL — SIGNIFICANT CHANGE UP (ref 11.5–15.5)
KETONES UR-MCNC: ABNORMAL
LEUKOCYTE ESTERASE UR-ACNC: NEGATIVE — SIGNIFICANT CHANGE UP
MAGNESIUM SERPL-MCNC: 2.1 MG/DL — SIGNIFICANT CHANGE UP (ref 1.6–2.6)
MCHC RBC-ENTMCNC: 28.1 PG — SIGNIFICANT CHANGE UP (ref 27–34)
MCHC RBC-ENTMCNC: 32.5 GM/DL — SIGNIFICANT CHANGE UP (ref 32–36)
MCV RBC AUTO: 86.4 FL — SIGNIFICANT CHANGE UP (ref 80–100)
NITRITE UR-MCNC: NEGATIVE — SIGNIFICANT CHANGE UP
NRBC # BLD: 0 /100 WBCS — SIGNIFICANT CHANGE UP
NRBC # FLD: 0 K/UL — SIGNIFICANT CHANGE UP
PH UR: 6.5 — SIGNIFICANT CHANGE UP (ref 5–8)
PHOSPHATE SERPL-MCNC: 2.5 MG/DL — SIGNIFICANT CHANGE UP (ref 2.5–4.5)
PLATELET # BLD AUTO: 257 K/UL — SIGNIFICANT CHANGE UP (ref 150–400)
POTASSIUM SERPL-MCNC: 3.7 MMOL/L — SIGNIFICANT CHANGE UP (ref 3.5–5.3)
POTASSIUM SERPL-SCNC: 3.7 MMOL/L — SIGNIFICANT CHANGE UP (ref 3.5–5.3)
PROT SERPL-MCNC: 6.4 G/DL — SIGNIFICANT CHANGE UP (ref 6–8.3)
PROT UR-MCNC: ABNORMAL
RBC # BLD: 5.31 M/UL — HIGH (ref 3.8–5.2)
RBC # FLD: 15.3 % — HIGH (ref 10.3–14.5)
RBC CASTS # UR COMP ASSIST: 0 /HPF — SIGNIFICANT CHANGE UP (ref 0–4)
SODIUM SERPL-SCNC: 139 MMOL/L — SIGNIFICANT CHANGE UP (ref 135–145)
SP GR SPEC: 1.04 — HIGH (ref 1.01–1.02)
TSH SERPL-MCNC: 2.25 UIU/ML — SIGNIFICANT CHANGE UP (ref 0.27–4.2)
UROBILINOGEN FLD QL: SIGNIFICANT CHANGE UP
WBC # BLD: 5.52 K/UL — SIGNIFICANT CHANGE UP (ref 3.8–10.5)
WBC # FLD AUTO: 5.52 K/UL — SIGNIFICANT CHANGE UP (ref 3.8–10.5)
WBC UR QL: 1 /HPF — SIGNIFICANT CHANGE UP (ref 0–5)

## 2021-05-17 PROCEDURE — 99223 1ST HOSP IP/OBS HIGH 75: CPT

## 2021-05-17 PROCEDURE — 12345: CPT | Mod: NC

## 2021-05-17 RX ORDER — HYDRALAZINE HCL 50 MG
50 TABLET ORAL THREE TIMES A DAY
Refills: 0 | Status: DISCONTINUED | OUTPATIENT
Start: 2021-05-17 | End: 2021-05-17

## 2021-05-17 RX ORDER — ATORVASTATIN CALCIUM 80 MG/1
40 TABLET, FILM COATED ORAL AT BEDTIME
Refills: 0 | Status: DISCONTINUED | OUTPATIENT
Start: 2021-05-17 | End: 2021-06-01

## 2021-05-17 RX ORDER — LABETALOL HCL 100 MG
10 TABLET ORAL ONCE
Refills: 0 | Status: COMPLETED | OUTPATIENT
Start: 2021-05-17 | End: 2021-05-17

## 2021-05-17 RX ORDER — DEXTROSE 50 % IN WATER 50 %
25 SYRINGE (ML) INTRAVENOUS ONCE
Refills: 0 | Status: DISCONTINUED | OUTPATIENT
Start: 2021-05-17 | End: 2021-06-01

## 2021-05-17 RX ORDER — HYDRALAZINE HCL 50 MG
5 TABLET ORAL ONCE
Refills: 0 | Status: DISCONTINUED | OUTPATIENT
Start: 2021-05-17 | End: 2021-05-17

## 2021-05-17 RX ORDER — HYDRALAZINE HCL 50 MG
10 TABLET ORAL ONCE
Refills: 0 | Status: COMPLETED | OUTPATIENT
Start: 2021-05-17 | End: 2021-05-17

## 2021-05-17 RX ORDER — DEXTROSE 50 % IN WATER 50 %
15 SYRINGE (ML) INTRAVENOUS ONCE
Refills: 0 | Status: DISCONTINUED | OUTPATIENT
Start: 2021-05-17 | End: 2021-06-01

## 2021-05-17 RX ORDER — INSULIN LISPRO 100/ML
VIAL (ML) SUBCUTANEOUS
Refills: 0 | Status: DISCONTINUED | OUTPATIENT
Start: 2021-05-17 | End: 2021-06-01

## 2021-05-17 RX ORDER — SODIUM CHLORIDE 9 MG/ML
3 INJECTION INTRAMUSCULAR; INTRAVENOUS; SUBCUTANEOUS EVERY 8 HOURS
Refills: 0 | Status: DISCONTINUED | OUTPATIENT
Start: 2021-05-17 | End: 2021-06-01

## 2021-05-17 RX ORDER — LABETALOL HCL 100 MG
5 TABLET ORAL ONCE
Refills: 0 | Status: COMPLETED | OUTPATIENT
Start: 2021-05-17 | End: 2021-05-17

## 2021-05-17 RX ORDER — ENOXAPARIN SODIUM 100 MG/ML
40 INJECTION SUBCUTANEOUS EVERY 12 HOURS
Refills: 0 | Status: DISCONTINUED | OUTPATIENT
Start: 2021-05-17 | End: 2021-05-26

## 2021-05-17 RX ORDER — ASPIRIN/CALCIUM CARB/MAGNESIUM 324 MG
81 TABLET ORAL DAILY
Refills: 0 | Status: DISCONTINUED | OUTPATIENT
Start: 2021-05-17 | End: 2021-06-01

## 2021-05-17 RX ORDER — HYDRALAZINE HCL 50 MG
50 TABLET ORAL ONCE
Refills: 0 | Status: COMPLETED | OUTPATIENT
Start: 2021-05-17 | End: 2021-05-17

## 2021-05-17 RX ORDER — SODIUM CHLORIDE 9 MG/ML
1000 INJECTION, SOLUTION INTRAVENOUS
Refills: 0 | Status: DISCONTINUED | OUTPATIENT
Start: 2021-05-17 | End: 2021-06-01

## 2021-05-17 RX ORDER — DEXTROSE 50 % IN WATER 50 %
12.5 SYRINGE (ML) INTRAVENOUS ONCE
Refills: 0 | Status: DISCONTINUED | OUTPATIENT
Start: 2021-05-17 | End: 2021-06-01

## 2021-05-17 RX ORDER — HYDRALAZINE HCL 50 MG
0 TABLET ORAL
Qty: 0 | Refills: 0 | DISCHARGE

## 2021-05-17 RX ORDER — INSULIN LISPRO 100/ML
VIAL (ML) SUBCUTANEOUS AT BEDTIME
Refills: 0 | Status: DISCONTINUED | OUTPATIENT
Start: 2021-05-17 | End: 2021-06-01

## 2021-05-17 RX ORDER — HYDROCHLOROTHIAZIDE 25 MG
25 TABLET ORAL
Refills: 0 | Status: DISCONTINUED | OUTPATIENT
Start: 2021-05-17 | End: 2021-05-19

## 2021-05-17 RX ORDER — GLUCAGON INJECTION, SOLUTION 0.5 MG/.1ML
1 INJECTION, SOLUTION SUBCUTANEOUS ONCE
Refills: 0 | Status: DISCONTINUED | OUTPATIENT
Start: 2021-05-17 | End: 2021-06-01

## 2021-05-17 RX ORDER — HYDRALAZINE HCL 50 MG
100 TABLET ORAL THREE TIMES A DAY
Refills: 0 | Status: DISCONTINUED | OUTPATIENT
Start: 2021-05-17 | End: 2021-05-18

## 2021-05-17 RX ADMIN — Medication 50 MILLIGRAM(S): at 12:05

## 2021-05-17 RX ADMIN — SODIUM CHLORIDE 3 MILLILITER(S): 9 INJECTION INTRAMUSCULAR; INTRAVENOUS; SUBCUTANEOUS at 13:08

## 2021-05-17 RX ADMIN — Medication 50 MILLIGRAM(S): at 06:27

## 2021-05-17 RX ADMIN — Medication 50 MILLIGRAM(S): at 15:19

## 2021-05-17 RX ADMIN — Medication 100 MILLIGRAM(S): at 21:27

## 2021-05-17 RX ADMIN — SODIUM CHLORIDE 3 MILLILITER(S): 9 INJECTION INTRAMUSCULAR; INTRAVENOUS; SUBCUTANEOUS at 04:25

## 2021-05-17 RX ADMIN — ATORVASTATIN CALCIUM 40 MILLIGRAM(S): 80 TABLET, FILM COATED ORAL at 21:27

## 2021-05-17 RX ADMIN — Medication 81 MILLIGRAM(S): at 11:05

## 2021-05-17 RX ADMIN — Medication 5 MILLIGRAM(S): at 10:55

## 2021-05-17 RX ADMIN — Medication 10 MILLIGRAM(S): at 03:01

## 2021-05-17 RX ADMIN — ENOXAPARIN SODIUM 40 MILLIGRAM(S): 100 INJECTION SUBCUTANEOUS at 04:26

## 2021-05-17 RX ADMIN — Medication 25 MILLIGRAM(S): at 17:11

## 2021-05-17 RX ADMIN — SODIUM CHLORIDE 3 MILLILITER(S): 9 INJECTION INTRAMUSCULAR; INTRAVENOUS; SUBCUTANEOUS at 21:58

## 2021-05-17 RX ADMIN — Medication 10 MILLIGRAM(S): at 03:54

## 2021-05-17 RX ADMIN — Medication 0.1 MILLIGRAM(S): at 05:05

## 2021-05-17 RX ADMIN — ENOXAPARIN SODIUM 40 MILLIGRAM(S): 100 INJECTION SUBCUTANEOUS at 17:10

## 2021-05-17 RX ADMIN — Medication 0.1 MILLIGRAM(S): at 17:11

## 2021-05-17 NOTE — H&P ADULT - HISTORY OF PRESENT ILLNESS
Pt is a  59 yo female with PMHx of Hypertension and DM, Osteoarthritis and B/L Carpel Tunnel.  She denies CAD.  She was discharged from [facility] yesterday and upon walking up steps at home, on the 4th step her left leg went totally weak and she sat on the step.  She denies a true fall or hurting her hip, head or any other body part.  She denies associated dizziness, back, hip, knee or ankle pain prior to the left leg weakness. She came back to the ER for repeat evaluation.    CTH & CT angio H&N showed: CT HEAD: Age-indeterminate lacunar infarct in the left thalamus. No acute intracranial hemorrhage.  CTA BRAIN: Degraded exam. No evidence of proximal vessel occlusion or flow-limiting stenosis.  CTA NECK: Degraded exam, with essentially nondiagnostic evaluation of the vertebral arteries. No occlusion or flow-limiting stenosis in the carotid arteries.  CT Left HIP: PRELIM - No acute fracture. Follow up final report.  X-ray KNEE: There is no fracture or dislocation. No knee effusion.  The joint spaces are preserved. No abnormal soft tissue swelling or mineralization.    Admission for further workup is recommended.        Pt is a  59 yo female with PMHx of Hypertension and DM, Osteoarthritis and B/L Carpel Tunnel.  She denies CAD.  She was seen, treated and discharged from the ER at [facility] yesterday for c/o a few months of leg weakness and  left knee pain  (5/16/21) and upon walking up steps at home, on the 4th step her left leg went totally weak and she sat on the step.  She denies a true fall or hurting her hip, head or any other body part.  She denies associated dizziness, back, hip, knee or ankle pain prior to the left leg weakness. She came back to the ER for repeat evaluation.    CTH & CT angio H&N showed: CT HEAD: Age-indeterminate lacunar infarct in the left thalamus. No acute intracranial hemorrhage.  CTA BRAIN: Degraded exam. No evidence of proximal vessel occlusion or flow-limiting stenosis.  CTA NECK: Degraded exam, with essentially nondiagnostic evaluation of the vertebral arteries. No occlusion or flow-limiting stenosis in the carotid arteries.  CT Left HIP: PRELIM - No acute fracture. Follow up final report.  X-ray KNEE: There is no fracture or dislocation. No knee effusion.  The joint spaces are preserved. No abnormal soft tissue swelling or mineralization.    Admission for further workup is recommended.

## 2021-05-17 NOTE — PROVIDER CONTACT NOTE (OTHER) - ACTION/TREATMENT ORDERED:
ACP, Sabrina Morales, informed. WIll continue to monitor and follow up with any further instructions given

## 2021-05-17 NOTE — H&P ADULT - NEUROLOGICAL DETAILS
slight decreased strength to left hand  and left leg raise at hip/alert and oriented x 3/cranial nerves intact/strength decreased

## 2021-05-17 NOTE — PROVIDER CONTACT NOTE (OTHER) - ACTION/TREATMENT ORDERED:
ACP, Sabrina Morales, informed. Will continue to monitor and follow up with any further instructions given.

## 2021-05-17 NOTE — OCCUPATIONAL THERAPY INITIAL EVALUATION ADULT - PERTINENT HX OF CURRENT PROBLEM, REHAB EVAL
Pt is a  59 yo female with PMHx of Hypertension and DM, Osteoarthritis and B/L Carpel Tunnel.  She denies CAD.  She was seen, treated and discharged from  the ER at [facility] yesterday for c/o a few months of leg weakness and  left knee pain  (5/16/21)  and upon walking up steps at home, on the 4th step her left leg went totally weak and she sat on the step.  She came back to the ER for repeat evaluation. Pt is a 61 yo female with PMHx of Hypertension and DM, Osteoarthritis and B/L Carpel Tunnel.  She denies CAD.  She was seen, treated and discharged from  the ER at [facility] yesterday for c/o a few months of leg weakness and  left knee pain  (5/16/21)  and upon walking up steps at home, on the 4th step her left leg went totally weak and she sat on the step.  She came back to the ER for repeat evaluation.

## 2021-05-17 NOTE — PROGRESS NOTE ADULT - SUBJECTIVE AND OBJECTIVE BOX
[facility] Division of Hospital Medicine  Daniel Mancilla DO  Pager (VIPUL-RAYMOND, 1J-4B): [phone]    Patient is a 60y old  Female who presents with a chief complaint of Pt had fallen after sudden weakness in left leg walking up steps. (17 May 2021 02:42)    SUBJECTIVE / OVERNIGHT EVENTS: Pt currently unable to move her LLE and can only slightly move her LUE.  Denies numbness or loss of sensation in her limbs. Denies CP/SOB.  Denies HA, but did have an episode of blurry vision last week that resolved on its own.  Reports daily adherence to her medications; reports that her BP has been routinely in the 200s/100s - PMD has made numerous adjustments to her medications.  Denies trauma to explain her L sided weakness.     MEDICATIONS  (STANDING):  aspirin enteric coated 81 milliGRAM(s) Oral daily  atorvastatin 40 milliGRAM(s) Oral at bedtime  cloNIDine 0.1 milliGRAM(s) Oral two times a day  dextrose 40% Gel 15 Gram(s) Oral once  dextrose 5%. 1000 milliLiter(s) (50 mL/Hr) IV Continuous <Continuous>  dextrose 5%. 1000 milliLiter(s) (100 mL/Hr) IV Continuous <Continuous>  dextrose 50% Injectable 25 Gram(s) IV Push once  dextrose 50% Injectable 12.5 Gram(s) IV Push once  dextrose 50% Injectable 25 Gram(s) IV Push once  enoxaparin Injectable 40 milliGRAM(s) SubCutaneous every 12 hours  glucagon  Injectable 1 milliGRAM(s) IntraMuscular once  hydrALAZINE 50 milliGRAM(s) Oral three times a day  hydrochlorothiazide 25 milliGRAM(s) Oral <User Schedule>  insulin lispro (ADMELOG) corrective regimen sliding scale   SubCutaneous three times a day before meals  insulin lispro (ADMELOG) corrective regimen sliding scale   SubCutaneous at bedtime  sodium chloride 0.9% lock flush 3 milliLiter(s) IV Push every 8 hours    CAPILLARY BLOOD GLUCOSE      POCT Blood Glucose.: 109 mg/dL (17 May 2021 12:34)  POCT Blood Glucose.: 114 mg/dL (17 May 2021 08:57)    I&O's Summary      PHYSICAL EXAM:  Vital Signs Last 24 Hrs  T(C): 36.8 (17 May 2021 10:20), Max: 36.9 (17 May 2021 02:35)  T(F): 98.2 (17 May 2021 10:20), Max: 98.4 (17 May 2021 02:35)  HR: 75 (17 May 2021 11:00) (61 - 88)  BP: 195/99 (17 May 2021 11:00) (173/115 - 251/104)  BP(mean): --  RR: 18 (17 May 2021 10:20) (16 - 20)  SpO2: 100% (17 May 2021 10:20) (95% - 100%)    CONSTITUTIONAL: NAD, morbidly obese  EYES: PERRLA; conjunctiva and sclera clear  RESPIRATORY: Normal respiratory effort; lungs are clear to auscultation bilaterally  CARDIOVASCULAR: Distant heart sounds 2/2 habitus, no lower extremity pitting edema; Peripheral pulses are 2+ bilaterally  ABDOMEN: Nontender to palpation, normoactive bowel sounds, no rebound/guarding  MUSCLOSKELETAL:  No clubbing or cyanosis of digits; no joint swelling or tenderness to palpation  PSYCH: A+O to person, place, and time; affect appropriate  NEUROLOGY: CN 2-12 are intact and symmetric; no gross sensory deficits; motor function of LLE 0/5, LUE 1/5, RLE and RUE intact   SKIN: No rashes; no palpable lesions    LABS:                        14.9   5.52  )-----------( 257      ( 17 May 2021 07:51 )             45.9     05-17    139  |  104  |  18  ----------------------------<  114<H>  3.7   |  21<L>  |  0.97    Ca    9.7      17 May 2021 07:51  Phos  2.5     05-  Mg     2.1     05-    TPro  6.4  /  Alb  3.7  /  TBili  0.9  /  DBili  <0.2  /  AST  18  /  ALT  12  /  AlkPhos  68  05-17    PT/INR - ( 16 May 2021 18:56 )   PT: 12.8 sec;   INR: 1.13 ratio         PTT - ( 16 May 2021 18:56 )  PTT:37.4 sec      Urinalysis Basic - ( 17 May 2021 06:51 )    Color: Light Yellow / Appearance: Clear / S.037 / pH: x  Gluc: x / Ketone: Small  / Bili: Negative / Urobili: <2 mg/dL   Blood: x / Protein: Trace / Nitrite: Negative   Leuk Esterase: Negative / RBC: 0 /HPF / WBC 1 /HPF   Sq Epi: x / Non Sq Epi: Occasional / Bacteria: Few      RADIOLOGY & ADDITIONAL TESTS:  Results Reviewed:   < from: CT Angio Head w/ IV Cont (21 @ 23:06) >  CTA BRAIN  This study is degraded by venous contamination.    INTERNAL CAROTID ARTERIES:  The intracranial segments of the ICA are patent without hemodynamically significantstenosis, occlusion, or aneurysm. The ICA bifurcations are unremarkable.    ANTERIOR CEREBRAL ARTERIES: No proximal flow-limiting stenosis or occlusion. Anterior communicating artery is unremarkable without aneurysm.    MIDDLE CEREBRAL ARTERIES: No proximal flow-limiting stenosis or occlusion. MCA bifurcations are unremarkable without aneurysm.    POSTERIOR CEREBRAL ARTERIES: No proximal flow-limiting stenosis or occlusion. Posterior communicating artery is well-developed on the right.    VERTEBROBASILAR SYSTEM: The right vertebral artery predominantly terminates in the PICA. The basilar flow is predominantly supplied by the left vertebral artery. The distal left V4 segment demonstrates short segment of attenuated flow, estimated at 50%. The basilar tip is unremarkable    CTA NECK  This study is degraded by combination of streak artifact from the patient's body habitus and shoulder positioning as well and motion.    RIGHT CAROTID SYSTEM: Normal in course and caliber without flow-limiting stenosis or occlusion.    LEFT CAROTID SYSTEM: Normal in course and caliber without flow-limiting stenosis or occlusion.    VERTEBRAL SYSTEM: Very limited evaluation of the origin and majority of the cervical portions of the bilateral vertebral arteries due to the above limitations. The arteries are grossly patent.    AORTIC ARCH: Typical three-vessel arch..    IMPRESSION:    CT HEAD: Age-indeterminate lacunar infarct in the left thalamus. No acute intracranial hemorrhage.    CTA BRAIN: Degraded exam. No evidence of proximal vessel occlusion or flow-limiting stenosis.    CTA NECK: Degraded exam, with essentially nondiagnostic evaluation of the vertebral arteries. No occlusion or flow-limiting stenosis in the carotid arteries.    < end of copied text >    < from: CT Hip No Cont, Left (21 @ 23:06) >  FINDINGS:    There is no evidence of acute fracture or dislocation. There is severe left hip arthrosis with prominent subchondral cystic change along the posterior margin of the articulation. There is mild bilateral sacral iliac joint arthrosis.    There is no subcutaneous or intramuscular hematoma. There is a calcified uterine fibroid. There is colonic diverticulosis.    IMPRESSION:    Severe left hip arthrosis.    No evidence of acute fracture or dislocation.    < end of copied text >      COORDINATION OF CARE:  Care Discussed with Consultants/Other Providers [Y/N]: Neuro Dr. Foster

## 2021-05-17 NOTE — PHYSICAL THERAPY INITIAL EVALUATION ADULT - MANUAL MUSCLE TESTING RESULTS, REHAB EVAL
Except left Lower Extremity 0 to 1+/5 & left Upper Extremity 2-/5/no strength deficits were identified

## 2021-05-17 NOTE — PHYSICAL THERAPY INITIAL EVALUATION ADULT - GENERAL OBSERVATIONS, REHAB EVAL
Patient was received and left supine with head of bed elevated, bed alarm activated, call bell in reach & RN present.

## 2021-05-17 NOTE — H&P ADULT - PROBLEM SELECTOR PLAN 1
Unknown cause of neurologic complaints and deficits at this time. Radiology of left hip and knee, CTH and CTA do not show any obvious source of left leg weakness.   Imaging of pts back has not been done which will need to be evaluated to gain full insight and complete her musculoskeletal and neurologic work up from a radiology standpoint.  Any further intervention as far a LP to be discussed with Neuro tomorrow.  Neurology input appreciated, Neuro Attending input still pending  Bed rest for now  Physical Therapy consult  Need to clarify her recent workup and where this was, at [facility]? Unknown cause of neurologic complaints and deficits at this time. Radiology of left hip and knee, CTH and CTA do not show any obvious source of left leg weakness.   Imaging of pts back has not been done which will need to be evaluated to gain full insight and complete her musculoskeletal and neurologic work up from a radiology standpoint.  Any further intervention as far a LP to be discussed with Neuro tomorrow.  Neurology input appreciated, Neuro Attending input still pending  TELE  TTE  Bed rest for now  Physical Therapy consult  Need to clarify her recent workup and where this was, at [facility]? Unknown cause of neurologic complaints and deficits at this time. Radiology of left hip and knee, CTH and CTA do not show any obvious source of left leg weakness.   Imaging of pts back has not been done which will need to be evaluated to gain full insight and complete her musculoskeletal and neurologic work up from a radiology standpoint.  Any further intervention as far a LP to be discussed with Neuro tomorrow.  Neurology input appreciated, Neuro Attending input still pending  TELE  TTE  Per Neuro: Aspirin 81mg, Atorvastatin 40, titrate to LDL<70  Bed rest for now  Physical Therapy and OT consult  Need to clarify her recent workup and where this was, at [facility]?

## 2021-05-17 NOTE — H&P ADULT - ASSESSMENT
59 yo female with PMHx of Hypertension and DM, Osteoarthritis and B/L Carpel Tunnel.  She denies CAD.  She was discharged from [facility] yesterday (although I see no record of her visit) and upon walking up steps at home, on the 4th step her left leg went totally weak and she sat on the step.  She denies a true fall or hurting her hip, head or any other body part.  She denies associated dizziness, back, hip, knee or ankle pain prior to the left leg weakness. She came back to the ER for repeat evaluation.   Pt is a  61 yo female with PMHx of Hypertension and DM, Osteoarthritis and B/L Carpel Tunnel.  She denies CAD.  She was seen, treated and discharged from  the ER at [facility] yesterday for c/o a few months of leg weakness and  left knee pain  (5/16/21)  and upon walking up steps at home, on the 4th step her left leg went totally weak and she sat on the step.  She denies a true fall or hurting her hip, head or any other body part.  She denies associated dizziness, back, hip, knee or ankle pain prior to the left leg weakness. She came back to the ER for repeat evaluation.

## 2021-05-17 NOTE — H&P ADULT - PROBLEM SELECTOR PLAN 2
Pt unsure of meds but states she takes pills, no insulin  Carb steady diet  BERNABE for now, adjust as needed

## 2021-05-17 NOTE — H&P ADULT - PROBLEM SELECTOR PLAN 4
Weight loss recommended  Nutrition Consult  Physical Therapy Consult  Low salt, low fat, Carb steady diet  Check TSH

## 2021-05-17 NOTE — H&P ADULT - NEGATIVE NEUROLOGICAL SYMPTOMS
no paresthesias/no focal seizures/no syncope/no vertigo/no loss of sensation/no loss of consciousness

## 2021-05-17 NOTE — H&P ADULT - PROBLEM SELECTOR PLAN 5
DVT PPx - IMPROVE score - 3, Heparin 10K units TID SQ given BMI 58.8 DVT PPx - IMPROVE score - 3, At Risk -  Lovenox 40mg Q12hrs for BMI >35

## 2021-05-17 NOTE — H&P ADULT - PROBLEM SELECTOR PLAN 3
Pt unsure of medications, will need to ask pharmacy in am  Hydralazine 50mg po given in ER with SBP still above 200, as per Neuro resident, normotension is ok  Hydralazine 10mg IV given x 1  Will monitor BP and intervene as required

## 2021-05-17 NOTE — PROGRESS NOTE ADULT - PROBLEM SELECTOR PLAN 5
DVT PPx - IMPROVE score - 3, At Risk -  Lovenox 40mg Q12hrs for BMI >35 - DVT PPx - IMPROVE score - 3, At Risk -  Lovenox 40mg Q12hrs for BMI >35  - PT recommending WILD

## 2021-05-17 NOTE — PHYSICAL THERAPY INITIAL EVALUATION ADULT - DIAGNOSIS, PT EVAL
As per 5/16 CT: Severe left hip arthrosis (No evidence of acute fracture or dislocation); CT HEAD: Age-indeterminate lacunar infarct in the left thalamus. No acute intracranial hemorrhage

## 2021-05-17 NOTE — PROGRESS NOTE ADULT - PROBLEM SELECTOR PLAN 4
Weight loss recommended  Nutrition Consult  Physical Therapy Consult  Low salt, low fat, Carb steady diet  Check TSH - Weight loss counseling completed; pt likely also with JAYSHREE/OHS contributing to her uncontrolled BP   - Nutrition Consult

## 2021-05-17 NOTE — H&P ADULT - CONSTITUTIONAL
Physical Exam: 


SUBJECTIVE: Patient seen and examined, reports abdominal cramping and several 

episodes of blood stools this AM.  





OBJECTIVE:  Patient is a 71 y/o male with a past medical history of paroxysmal 

afib.  Patient was admitted from the emergency department for emergent 

condition.  





 Vital Signs











 Period  Temp  Pulse  Resp  BP Sys/Reddy  Pulse Ox


 


 Last 24 Hr  97.4 F-97.9 F  58-64  16-18  121-156/63-91  











GENERAL: The patient is awake, alert, and fully oriented, in no acute distress.


HEAD: Normal with no signs of trauma.


EYES: PERRL, extraocular movements intact, sclera anicteric, conjunctiva clear. 

No ptosis. 


ENT: Ears normal, nares patent, oropharynx clear without exudates, moist mucous 


membranes.


NECK: Trachea midline, full range of motion, supple. 


LUNGS: Breath sounds equal, clear to auscultation bilaterally, no wheezes, no 

crackles, no 


accessory muscle use. 


HEART: Regular rate and rhythm, S1, S2 without murmur, rub or gallop.


ABDOMEN: Soft, difuse abdominal tenderness,  nondistended, hyperactive  bowel 

sounds, no guarding, no 


rebound, no hepatosplenomegaly, no masses, blood tinged watery stools noted. 


EXTREMITIES: 2+ pulses, warm, well-perfused, no edema. 


NEUROLOGICAL: Cranial nerves II through XII grossly intact. Normal speech, gait 

not 


observed.


PSYCH: Normal mood, normal affect.


SKIN: Warm, dry, normal turgor, no rashes or lesions noted














 Laboratory Results - last 24 hr





 CBC











WBC  13.6 K/mm3 (4.0-10.8)  H  01/22/18  07:00    


 


RBC  5.16 M/mm3 (4.00-5.60)   01/22/18  07:00    


 


Hgb  15.3 GM/dl (11.7-16.9)   01/22/18  07:00    


 


Hct  45.6 % (35.4-49)   01/22/18  07:00    


 


MCV  88.2 fl (80-96)   01/22/18  07:00    


 


MCH  29.6 pg (25.7-33.7)   01/22/18  07:00    


 


MCHC  33.6 g/dl (32.0-35.9)   01/22/18  07:00    


 


RDW  12.9 % (11.9-15.9)   01/22/18  07:00    


 


Plt Count  224 K/MM3 (134-434)   01/22/18  07:00    


 


MPV  9.3 fl (7.5-11.1)   01/22/18  07:00    


 


Neutrophils %  84.2 % (42.8-82.8)  H  01/22/18  07:00    


 


Lymphocytes %  9.3 % (8-40)   01/22/18  07:00    


 


Monocytes %  6.3 % (3.8-10.2)   01/22/18  07:00    


 


Eosinophils %  0.2 % (0-4.5)   01/22/18  07:00    


 


Basophils %  0.0 % (0-2.0)   01/22/18  07:00    








 CMP











Sodium  133 mmol/L (136-145)  L  01/22/18  07:00    


 


Potassium  3.9 mmol/L (3.5-5.1)   01/22/18  07:00    


 


Chloride  99 mmol/L ()   01/22/18  07:00    


 


Carbon Dioxide  27 mmol/L (22-28)   01/22/18  07:00    


 


Anion Gap  7  (8-16)  L  01/22/18  07:00    


 


BUN  11 mg/dl (7-18)  D 01/22/18  07:00    


 


Creatinine  0.9 mg/dl (0.6-1.3)   01/22/18  07:00    


 


Creat Clearance w eGFR  > 60  (>60)   01/21/18  20:20    


 


Random Glucose  115 mg/dl ()  H  01/22/18  07:00    


 


Calcium  8.5 mg/dl (8.4-10.2)   01/22/18  07:00    


 


Phosphorus  2.8 mg/dl (2.5-4.6)   01/22/18  07:00    


 


Magnesium  2.0 mg/dL (1.8-2.4)   01/22/18  07:00    


 


Total Bilirubin  0.8 mg/dl (0.2-1.0)   01/21/18  20:20    


 


AST  28 U/L (10-42)   01/21/18  20:20    


 


ALT  29 U/L (10-40)   01/21/18  20:20    


 


Alkaline Phosphatase  56 U/L (32-92)   01/21/18  20:20    


 


Total Protein  6.8 g/dl (6.4-8.3)   01/21/18  20:20    


 


Albumin  4.1 g/dl (3.5-5.0)   01/21/18  20:20    


 


Lipase  18 U/L (22-51)  L  01/21/18  20:20    











Active Medications











Generic Name Dose Route Start Last Admin





  Trade Name Freq  PRN Reason Stop Dose Admin


 


Atenolol  25 mg  01/22/18 10:00  





  Tenormin -  PO   





  BID KOREY   


 


Metronidazole  500 mg in 100 mls @ 100 mls/hr  01/22/18 02:00  01/22/18 01:19





  Flagyl 500mg Premixed Ivpb -  IVPB   100 mls/hr





  Q8H-IV KOREY   Administration


 


Dextrose/Sodium Chloride  1,000 mls @ 75 mls/hr  01/22/18 00:30  01/22/18 00:40





  D5-1/2ns -  IV   75 mls/hr





  ASDIR KOREY   Administration


 


Influenza Virus Vaccine Quadrival  60 mcg  01/22/18 10:00  





  Flulaval Quad [id number]  IM  01/22/18 10:01  





  .ONCE ONE   


 


Morphine Sulfate  4 mg  01/22/18 01:47  01/22/18 02:05





  Morphine Sulfate  IVPUSH   4 mg





  Q6H PRN   Administration





  PAIN LEVEL 6-10   











ASSESSMENT/PLAN:





1. GI


lower GI Bleeding


- repeat hgb 15.3 stable, strict monitoring


- pt started augmentin on Thursday, 01/11/18, s/p dental implants, last dose of 

augmenting was yesterday 01/21/18, pending stool culture for c. diff, start 

flagyl 500mg TID 


- diffuse abd tenderness noted on exam, pending ct scan of abd/pelvis with oral 

contrast


- last colonscopy was 2 years ago, Dr Floyd, polyps removed, negative as 

per patient, Dr Floyd does not have privileges at this hospital, Dr Mckoy (GI

) at bedside evaluating patient, appreciate input 





2) ID


leukocytosis


- wbc 13.6, downtrending, pending stool and c diff cultures, pending ct scan of 

abd/pelvis


- monitor wbc and fever trend 





3) cardiovascular


paroxysmal afib


- pt is in nsr, continous cardiac monitoring


- continue atenolol 





 FEN


- D51/2NS@75ml/hr


- Replete lytes prn


- NPO in am, then Advance to Clear as tolerated





6. DVT Prophylaxis


- OOB


- SCDs


- No ACs secondary to Rectal Bleed





Code Status: Full Code





Dispo: Tele Observation





Visit type





- Emergency Visit


Emergency Visit: Yes


ED Registration Date: 01/22/18


Care time: The patient presented to the Emergency Department on the above date 

and was hospitalized for further evaluation of their emergent condition.





- New Patient


This patient is new to me today: No





- Critical Care


Critical Care patient: No





- Discharge Referral


Referred to [facility] No detailed exam

## 2021-05-17 NOTE — H&P ADULT - ATTENDING COMMENTS
61 yo female with PMHx of Hypertension and DM, Osteoarthritis and B/L Carpel Tunnel. Admitted for focal weakness and CVA w/u. At the time of this eval, patient is HD stable and no additional complaints. BP elevated.   Plan: F/u further neuro recs and obtain imaging as necessary. Restart BP med including clonidine for BP control. Monitor on tele and check TTE. Check UA. DM control.

## 2021-05-17 NOTE — H&P ADULT - MUSCULOSKELETAL
dec ROM of left hip and knee/no joint swelling/no calf tenderness/decreased ROM due to pain details… detailed exam

## 2021-05-17 NOTE — H&P ADULT - NSHPLABSRESULTS_GEN_ALL_CORE
14.3   6.55  )-----------( 286      ( 16 May 2021 18:56 )             44.0   05-16    139  |  103  |  22  ----------------------------<  129<H>  4.1   |  26  |  1.21    Ca    10.0      16 May 2021 18:56    TPro  6.6  /  Alb  4.0  /  TBili  0.8  /  DBili  x   /  AST  18  /  ALT  12  /  AlkPhos  69  05-16      CTH & CT angio H&N showed: CT HEAD: Age-indeterminate lacunar infarct in the left thalamus. No acute intracranial hemorrhage.  CTA BRAIN: Degraded exam. No evidence of proximal vessel occlusion or flow-limiting stenosis.  CTA NECK: Degraded exam, with essentially nondiagnostic evaluation of the vertebral arteries. No occlusion or flow-limiting stenosis in the carotid arteries.  CT Left HIP: PRELIM - No acute fracture. Follow up final report.  X-ray KNEE: There is no fracture or dislocation. No knee effusion.  The joint spaces are preserved. No abnormal soft tissue swelling or mineralization.

## 2021-05-18 LAB
A1C WITH ESTIMATED AVERAGE GLUCOSE RESULT: 6.3 % — HIGH (ref 4–5.6)
ANION GAP SERPL CALC-SCNC: 13 MMOL/L — SIGNIFICANT CHANGE UP (ref 7–14)
BUN SERPL-MCNC: 19 MG/DL — SIGNIFICANT CHANGE UP (ref 7–23)
CALCIUM SERPL-MCNC: 9.8 MG/DL — SIGNIFICANT CHANGE UP (ref 8.4–10.5)
CHLORIDE SERPL-SCNC: 104 MMOL/L — SIGNIFICANT CHANGE UP (ref 98–107)
CHOLEST SERPL-MCNC: 171 MG/DL — SIGNIFICANT CHANGE UP
CO2 SERPL-SCNC: 21 MMOL/L — LOW (ref 22–31)
COVID-19 SPIKE DOMAIN AB INTERP: POSITIVE
COVID-19 SPIKE DOMAIN ANTIBODY RESULT: >250 U/ML — HIGH
CREAT SERPL-MCNC: 1.14 MG/DL — SIGNIFICANT CHANGE UP (ref 0.5–1.3)
ESTIMATED AVERAGE GLUCOSE: 134 MG/DL — HIGH (ref 68–114)
GLUCOSE SERPL-MCNC: 115 MG/DL — HIGH (ref 70–99)
HCT VFR BLD CALC: 45.4 % — HIGH (ref 34.5–45)
HCV AB S/CO SERPL IA: 0.1 S/CO — SIGNIFICANT CHANGE UP (ref 0–0.99)
HCV AB SERPL-IMP: SIGNIFICANT CHANGE UP
HDLC SERPL-MCNC: 58 MG/DL — SIGNIFICANT CHANGE UP
HGB BLD-MCNC: 14.4 G/DL — SIGNIFICANT CHANGE UP (ref 11.5–15.5)
LIPID PNL WITH DIRECT LDL SERPL: 101 MG/DL — HIGH
MAGNESIUM SERPL-MCNC: 2.2 MG/DL — SIGNIFICANT CHANGE UP (ref 1.6–2.6)
MCHC RBC-ENTMCNC: 27.9 PG — SIGNIFICANT CHANGE UP (ref 27–34)
MCHC RBC-ENTMCNC: 31.7 GM/DL — LOW (ref 32–36)
MCV RBC AUTO: 88 FL — SIGNIFICANT CHANGE UP (ref 80–100)
NON HDL CHOLESTEROL: 113 MG/DL — SIGNIFICANT CHANGE UP
NRBC # BLD: 0 /100 WBCS — SIGNIFICANT CHANGE UP
NRBC # FLD: 0 K/UL — SIGNIFICANT CHANGE UP
PHOSPHATE SERPL-MCNC: 3.8 MG/DL — SIGNIFICANT CHANGE UP (ref 2.5–4.5)
PLATELET # BLD AUTO: 281 K/UL — SIGNIFICANT CHANGE UP (ref 150–400)
POTASSIUM SERPL-MCNC: 3.7 MMOL/L — SIGNIFICANT CHANGE UP (ref 3.5–5.3)
POTASSIUM SERPL-SCNC: 3.7 MMOL/L — SIGNIFICANT CHANGE UP (ref 3.5–5.3)
RBC # BLD: 5.16 M/UL — SIGNIFICANT CHANGE UP (ref 3.8–5.2)
RBC # FLD: 15.5 % — HIGH (ref 10.3–14.5)
SARS-COV-2 IGG+IGM SERPL QL IA: >250 U/ML — HIGH
SARS-COV-2 IGG+IGM SERPL QL IA: POSITIVE
SODIUM SERPL-SCNC: 138 MMOL/L — SIGNIFICANT CHANGE UP (ref 135–145)
TRIGL SERPL-MCNC: 61 MG/DL — SIGNIFICANT CHANGE UP
WBC # BLD: 6.38 K/UL — SIGNIFICANT CHANGE UP (ref 3.8–10.5)
WBC # FLD AUTO: 6.38 K/UL — SIGNIFICANT CHANGE UP (ref 3.8–10.5)

## 2021-05-18 PROCEDURE — 99233 SBSQ HOSP IP/OBS HIGH 50: CPT

## 2021-05-18 PROCEDURE — 70551 MRI BRAIN STEM W/O DYE: CPT | Mod: 26

## 2021-05-18 RX ORDER — NIFEDIPINE 30 MG
30 TABLET, EXTENDED RELEASE 24 HR ORAL DAILY
Refills: 0 | Status: DISCONTINUED | OUTPATIENT
Start: 2021-05-18 | End: 2021-05-18

## 2021-05-18 RX ORDER — NIFEDIPINE 30 MG
30 TABLET, EXTENDED RELEASE 24 HR ORAL DAILY
Refills: 0 | Status: DISCONTINUED | OUTPATIENT
Start: 2021-05-18 | End: 2021-05-19

## 2021-05-18 RX ADMIN — Medication 0.1 MILLIGRAM(S): at 10:31

## 2021-05-18 RX ADMIN — ENOXAPARIN SODIUM 40 MILLIGRAM(S): 100 INJECTION SUBCUTANEOUS at 10:31

## 2021-05-18 RX ADMIN — ATORVASTATIN CALCIUM 40 MILLIGRAM(S): 80 TABLET, FILM COATED ORAL at 21:25

## 2021-05-18 RX ADMIN — Medication 100 MILLIGRAM(S): at 10:31

## 2021-05-18 RX ADMIN — ENOXAPARIN SODIUM 40 MILLIGRAM(S): 100 INJECTION SUBCUTANEOUS at 17:32

## 2021-05-18 RX ADMIN — SODIUM CHLORIDE 3 MILLILITER(S): 9 INJECTION INTRAMUSCULAR; INTRAVENOUS; SUBCUTANEOUS at 21:35

## 2021-05-18 RX ADMIN — Medication 25 MILLIGRAM(S): at 17:32

## 2021-05-18 RX ADMIN — SODIUM CHLORIDE 3 MILLILITER(S): 9 INJECTION INTRAMUSCULAR; INTRAVENOUS; SUBCUTANEOUS at 06:41

## 2021-05-18 RX ADMIN — Medication 81 MILLIGRAM(S): at 14:56

## 2021-05-18 RX ADMIN — SODIUM CHLORIDE 3 MILLILITER(S): 9 INJECTION INTRAMUSCULAR; INTRAVENOUS; SUBCUTANEOUS at 14:56

## 2021-05-18 RX ADMIN — Medication 30 MILLIGRAM(S): at 14:55

## 2021-05-18 NOTE — PROVIDER CONTACT NOTE (OTHER) - ASSESSMENT
Patient A&Ox4, VS as documented, NSR on tele. Patient denies chest pain and SOB. Permissive hypertension noted.

## 2021-05-18 NOTE — DIETITIAN INITIAL EVALUATION ADULT. - ADD RECOMMEND
1. Encourage & assist Pt with meals; Monitor PO diet tolerance; Honor food preferences;             2. Monitor labs, hydration status;           3. Will recommend to follow up with appropriate RDN upon discharge for the purposes of long-term nutrition evaluation and diet education;

## 2021-05-18 NOTE — PROGRESS NOTE ADULT - PROBLEM SELECTOR PLAN 4
- Weight loss counseling completed; pt likely also with JAYSHREE/OHS contributing to her uncontrolled BP   - Nutrition Consulted

## 2021-05-18 NOTE — PROGRESS NOTE ADULT - PROBLEM SELECTOR PLAN 5
- DVT PPx - IMPROVE score - 3, At Risk -  Lovenox 40mg Q12hrs for BMI >35  - PT recommending WILD (pt in agreement)

## 2021-05-18 NOTE — PROVIDER CONTACT NOTE (OTHER) - ASSESSMENT
Patient A&Ox4, VS as documented, NSR on tele. Patient denies chest pain and SOB. Patient refusing AM meds. Stated she wants to speak to a doctor regarding the medications because she has been here and her BP has not gone down.

## 2021-05-18 NOTE — PROGRESS NOTE ADULT - SUBJECTIVE AND OBJECTIVE BOX
Neurology Progress Note    S: Patient seen and examined. No new events overnight. patient denied CP, SOB, HA or pain. awaiting imaging. still wtih LE weakness      Medication:  aspirin enteric coated 81 milliGRAM(s) Oral daily  atorvastatin 40 milliGRAM(s) Oral at bedtime  cloNIDine 0.1 milliGRAM(s) Oral two times a day  dextrose 40% Gel 15 Gram(s) Oral once  dextrose 5%. 1000 milliLiter(s) IV Continuous <Continuous>  dextrose 5%. 1000 milliLiter(s) IV Continuous <Continuous>  dextrose 50% Injectable 25 Gram(s) IV Push once  dextrose 50% Injectable 12.5 Gram(s) IV Push once  dextrose 50% Injectable 25 Gram(s) IV Push once  enoxaparin Injectable 40 milliGRAM(s) SubCutaneous every 12 hours  glucagon  Injectable 1 milliGRAM(s) IntraMuscular once  hydrALAZINE 100 milliGRAM(s) Oral three times a day  hydrochlorothiazide 25 milliGRAM(s) Oral <User Schedule>  insulin lispro (ADMELOG) corrective regimen sliding scale   SubCutaneous three times a day before meals  insulin lispro (ADMELOG) corrective regimen sliding scale   SubCutaneous at bedtime  sodium chloride 0.9% lock flush 3 milliLiter(s) IV Push every 8 hours      Vitals:  Vital Signs Last 24 Hrs  T(C): 36 (18 May 2021 05:05), Max: 37 (17 May 2021 17:05)  T(F): 96.8 (18 May 2021 05:05), Max: 98.6 (17 May 2021 17:05)  HR: 70 (18 May 2021 05:05) (67 - 83)  BP: 193/99 (18 May 2021 05:05) (188/96 - 220/106)  BP(mean): --  RR: 18 (18 May 2021 05:05) (18 - 18)  SpO2: 98% (18 May 2021 05:05) (97% - 100%)    General:  Constitutional: Obese Female, appears stated age, in no apparent distress including pain  Head: Normocephalic & atraumatic.    Neurological (>12):  MS: Awake, alert, oriented to person, place, situation, time. Normal affect. Follows all commands.    Language: Speech is clear, fluent with good repetition & comprehension.    CNs: PERRLA (R = 3mm, L = 3mm). VF intact in all 4 quadrants, EOMI no nystagmus. Some reduced sensation to pinprick on the L side of the face, intact to LT throughout, well developed masseter muscles b/l. No facial asymmetry b/l, full eye closure strength b/l. Symmetric palate elevation in midline. Shoulder shrug intact b/l. Tongue midline, normal movements, no atrophy.    Motor: Normal muscle bulk & tone. No noticeable tremor or seizure. Noted L arm drift.                 Deltoid	Biceps	Triceps	   R	5	5	5	5  L	4+	4+	4+	4+	    	H-Flex	H-Ext	H-ABd	H-ADd	K-Flex	K-Ext	D-Flex	P-Flex  R	5	5	5	5	5	5	5	5 	   L	4-	4-	4	4	4+	4-	5	4+	     Sensation: Reduced sensation to pinprick in the distal L leg and somewhat on the lateral thigh. Reduced sensation to pinprick on the distal L arm. Intact to vibratory sense and proprioception throughout.    Reflexes:              Biceps(C5)       BR(C6)     Triceps(C7)               Patellar(L4)    Achilles(S1)    Plantar Resp  R	2	          2	             2		        0		    1		Mute   L	2	          2	             2		        0		    1		Mute     Coordination: No dysmetria to FTN    Gait: Deferred    I personally reviewed the below data/images/labs:      CBC Full  -  ( 18 May 2021 06:31 )  WBC Count : 6.38 K/uL  RBC Count : 5.16 M/uL  Hemoglobin : 14.4 g/dL  Hematocrit : 45.4 %  Platelet Count - Automated : 281 K/uL  Mean Cell Volume : 88.0 fL  Mean Cell Hemoglobin : 27.9 pg  Mean Cell Hemoglobin Concentration : 31.7 gm/dL  Auto Neutrophil # : x  Auto Lymphocyte # : x  Auto Monocyte # : x  Auto Eosinophil # : x  Auto Basophil # : x  Auto Neutrophil % : x  Auto Lymphocyte % : x  Auto Monocyte % : x  Auto Eosinophil % : x  Auto Basophil % : x        138  |  104  |  19  ----------------------------<  115<H>  3.7   |  21<L>  |  1.14    Ca    9.8      18 May 2021 06:31  Phos  3.8     05-18  Mg     2.2     05-18    TPro  6.4  /  Alb  3.7  /  TBili  0.9  /  DBili  <0.2  /  AST  18  /  ALT  12  /  AlkPhos  68  -17    LIVER FUNCTIONS - ( 17 May 2021 07:51 )  Alb: 3.7 g/dL / Pro: 6.4 g/dL / ALK PHOS: 68 U/L / ALT: 12 U/L / AST: 18 U/L / GGT: x           PT/INR - ( 16 May 2021 18:56 )   PT: 12.8 sec;   INR: 1.13 ratio         PTT - ( 16 May 2021 18:56 )  PTT:37.4 sec  Urinalysis Basic - ( 17 May 2021 06:51 )    Color: Light Yellow / Appearance: Clear / S.037 / pH: x  Gluc: x / Ketone: Small  / Bili: Negative / Urobili: <2 mg/dL   Blood: x / Protein: Trace / Nitrite: Negative   Leuk Esterase: Negative / RBC: 0 /HPF / WBC 1 /HPF   Sq Epi: x / Non Sq Epi: Occasional / Bacteria: Few      < from: CT Hip No Cont, Left (21 @ 23:06) >    EXAM:  CT HIP ONLY LT        PROCEDURE DATE:  May 16 2021         INTERPRETATION:  HISTORY: Left-sided hip pain.    Helical CT imaging of the left hip was performed without intravenous contrast. Sagittal and coronal reformats were provided. 3-D reformats were performed on a separate workstation.    Correlation is made to prior radiographs from a 2021.    FINDINGS:    There is no evidence of acute fracture or dislocation. There is severe left hip arthrosis with prominent subchondral cystic change along the posterior margin of the articulation. There is mild bilateral sacral iliac joint arthrosis.    There is no subcutaneous or intramuscular hematoma. There is a calcified uterine fibroid. There is colonic diverticulosis.    IMPRESSION:    Severe left hip arthrosis.    No evidence of acute fracture or dislocation.              ANNE VIDALES MD; Attending Radiologist  This document has been electronically signed. May 17 2021  8:50AM    < end of copied text >  < from: CT Angio Head w/ IV Cont (21 @ 23:06) >    EXAM:  CT ANGIO BRAIN (W)AW IC      EXAM:  CT ANGIO NECK (W)AW IC        PROCEDURE DATE:  May 16 2021         INTERPRETATION:  HISTORY: Left lower extremity weakness and slurred speech, for 2-3 days    COMPARISON: No similar prior    TECHNIQUE: Noncontrast CT head and CT angiogram of the neck and brain was performed after administration of intravenous contrast. MIP and 3D reconstructions were performed.    Total of 90 cc Omnipaque 350 intravenous contrast were administered without complication.10 cc discarded.    FINDINGS:    CT HEAD:    No acute intracranial hemorrhage, or CT evidence of acute, large territorial infarct. There is an age-indeterminate lacunar infarct in the left thalamus.. No hydrocephalus. Basilar cisterns are clear. Scalp and imaged midfacial soft tissues are unremarkable. Calvarium is intact.    CTA BRAIN  This study is degraded by venous contamination.    INTERNAL CAROTID ARTERIES:  The intracranial segments of the ICA are patent without hemodynamically significantstenosis, occlusion, or aneurysm. The ICA bifurcations are unremarkable.    ANTERIOR CEREBRAL ARTERIES: No proximal flow-limiting stenosis or occlusion. Anterior communicating artery is unremarkable without aneurysm.    MIDDLE CEREBRAL ARTERIES: No proximal flow-limiting stenosis or occlusion. MCA bifurcations are unremarkable without aneurysm.    POSTERIOR CEREBRAL ARTERIES: No proximal flow-limiting stenosis or occlusion. Posterior communicating artery is well-developed on the right.    VERTEBROBASILAR SYSTEM: The right vertebral artery predominantly terminates in the PICA. The basilar flow is predominantly supplied by the left vertebral artery. The distal left V4 segment demonstrates short segment of attenuated flow, estimated at 50%. The basilar tip is unremarkable    CTA NECK  This study is degraded by combination of streak artifact from the patient's body habitus and shoulder positioning as well and motion.    RIGHT CAROTID SYSTEM: Normal in course and caliber without flow-limiting stenosis or occlusion.    LEFT CAROTID SYSTEM: Normal in course and caliber without flow-limiting stenosis or occlusion.    VERTEBRAL SYSTEM: Very limited evaluation of the origin and majority of the cervical portions of the bilateral vertebral arteries due to the above limitations. The arteries are grossly patent.    AORTIC ARCH: Typical three-vessel arch..    IMPRESSION:    CT HEAD: Age-indeterminate lacunar infarct in the left thalamus. No acute intracranial hemorrhage.    CTA BRAIN: Degraded exam. No evidence of proximal vessel occlusion or flow-limiting stenosis.    CTA NECK: Degraded exam, with essentially nondiagnostic evaluation of the vertebral arteries. No occlusion or flow-limiting stenosis in the carotid arteries.            SINDI ESQUEDA MD, Resident Radiology  This document has been electronically signed.  ASUNCION GARRIDO MD; Attending Radiologist  This document has been electronically signed. May 16 2021 11:51PM    < end of copied text >

## 2021-05-18 NOTE — DIETITIAN INITIAL EVALUATION ADULT. - OTHER INFO
Pt 59 yo female with PMHx of Hypertension and DM2, severe osteoarthritis on hips and B/L Carpel Tunnel - per chart review.    At time of visit, Pt appears alert, oriented. Per Pt, her appetite good; no chewing or swallowing difficulty; no nausea, vomiting or diarrhea @ this time. But Pt C/O constipation. Per Pt her height: ~68" (5/17/21), Pt's weight: ~370#. Per Pt her UBW: ~400#. Weight management nutrition therapy discussed with Pt including better food choices, foods to avoid, low calorie meal plans. Of note Pt's HbA1c level 6.3% (5/18). Consistent Carbohydrate diet discussed with Pt as well. Written materials on diets also provided; Pt verbalized understanding. Will recommend to follow up with appropriate RDN upon discharge for the purposes of long-term nutrition evaluation and diet education. RDN remains available Pt made aware.

## 2021-05-18 NOTE — PROVIDER CONTACT NOTE (OTHER) - ACTION/TREATMENT ORDERED:
Patient educated on importance and indication for hydralazine, lovenox injection, and clonidine. ACP made aware, stated to reschedule AM meds so she can speak to doctor. Will continue to educate.

## 2021-05-18 NOTE — PROGRESS NOTE ADULT - SUBJECTIVE AND OBJECTIVE BOX
[facility] Division of Hospital Medicine  Daniel Mancilla DO  Pager (RILEY, 6S-5P): [phone]    Patient is a 60y old  Female who presents with a chief complaint of Pt had fallen after sudden weakness in left leg walking up steps. (18 May 2021 10:02)    SUBJECTIVE / OVERNIGHT EVENTS: Pt remains in LLE weakness, unable to move the leg at all.  She has some improvement in her L arm, able to gingerly lift her L arm.  R side remains intact.  Speech is intact and no facial droop noted. Pt refused her BP meds this AM and according to her PMD, the pt has been nonadherent with medications at home.  Pt states "I always have BP in the 200s, I just live like this."  Denies CP/SOB     MEDICATIONS  (STANDING):  aspirin enteric coated 81 milliGRAM(s) Oral daily  atorvastatin 40 milliGRAM(s) Oral at bedtime  dextrose 40% Gel 15 Gram(s) Oral once  dextrose 5%. 1000 milliLiter(s) (50 mL/Hr) IV Continuous <Continuous>  dextrose 5%. 1000 milliLiter(s) (100 mL/Hr) IV Continuous <Continuous>  dextrose 50% Injectable 25 Gram(s) IV Push once  dextrose 50% Injectable 12.5 Gram(s) IV Push once  dextrose 50% Injectable 25 Gram(s) IV Push once  enoxaparin Injectable 40 milliGRAM(s) SubCutaneous every 12 hours  glucagon  Injectable 1 milliGRAM(s) IntraMuscular once  hydrochlorothiazide 25 milliGRAM(s) Oral <User Schedule>  insulin lispro (ADMELOG) corrective regimen sliding scale   SubCutaneous three times a day before meals  insulin lispro (ADMELOG) corrective regimen sliding scale   SubCutaneous at bedtime  NIFEdipine XL 30 milliGRAM(s) Oral daily  sodium chloride 0.9% lock flush 3 milliLiter(s) IV Push every 8 hours    CAPILLARY BLOOD GLUCOSE      POCT Blood Glucose.: 119 mg/dL (18 May 2021 08:36)  POCT Blood Glucose.: 131 mg/dL (17 May 2021 21:53)  POCT Blood Glucose.: 103 mg/dL (17 May 2021 17:32)    I&O's Summary      PHYSICAL EXAM:  Vital Signs Last 24 Hrs  T(C): 36.2 (18 May 2021 10:28), Max: 37 (17 May 2021 17:05)  T(F): 97.1 (18 May 2021 10:28), Max: 98.6 (17 May 2021 17:05)  HR: 81 (18 May 2021 10:28) (67 - 81)  BP: 192/81 (18 May 2021 10:28) (188/96 - 220/106)  BP(mean): --  RR: 18 (18 May 2021 10:28) (18 - 18)  SpO2: 100% (18 May 2021 10:28) (97% - 100%)    CONSTITUTIONAL: NAD, morbidly obese   EYES: PERRLA; conjunctiva and sclera clear  RESPIRATORY: Normal respiratory effort; Distant Breath sounds 2/2 body habitus   CARDIOVASCULAR: Distant heart sounds 2/2 body habitus, No lower extremity edema; Peripheral pulses are 2+ bilaterally  ABDOMEN: Nontender to palpation, normoactive bowel sounds, no rebound/guarding  MUSCLOSKELETAL:  No clubbing or cyanosis of digits; no joint swelling or tenderness to palpation  PSYCH: A+O to person, place, and time; affect appropriate  NEUROLOGY: CN 2-12 are intact and symmetric; no gross sensory deficits; LLE 0/5 motor, LUE 2/5, RUE and RLE intact   SKIN: No rashes; no palpable lesions    LABS:                        14.4   6.38  )-----------( 281      ( 18 May 2021 06:31 )             45.4     05-18    138  |  104  |  19  ----------------------------<  115<H>  3.7   |  21<L>  |  1.14    Ca    9.8      18 May 2021 06:31  Phos  3.8     05-18  Mg     2.2     05-18    TPro  6.4  /  Alb  3.7  /  TBili  0.9  /  DBili  <0.2  /  AST  18  /  ALT  12  /  AlkPhos  68  05-17    PT/INR - ( 16 May 2021 18:56 )   PT: 12.8 sec;   INR: 1.13 ratio         PTT - ( 16 May 2021 18:56 )  PTT:37.4 sec      Urinalysis Basic - ( 17 May 2021 06:51 )    Color: Light Yellow / Appearance: Clear / S.037 / pH: x  Gluc: x / Ketone: Small  / Bili: Negative / Urobili: <2 mg/dL   Blood: x / Protein: Trace / Nitrite: Negative   Leuk Esterase: Negative / RBC: 0 /HPF / WBC 1 /HPF   Sq Epi: x / Non Sq Epi: Occasional / Bacteria: Few      A1C with Estimated Average Glucose (21 @ 06:31)    A1C with Estimated Average Glucose Result: 6.3: High Risk (prediabetic)    5.7 - 6.4 %  Diabetic, diagnostic           > 6.5 %  ADA diabetic treatment goal    < 7.0 %  HbA1C values may not accurately reflect mean blood glucose in patients  with Hb variants.  Suggest clinical correlation. %    Estimated Average Glucose: 134 mg/dL    Lipid Profile in AM (21 @ 06:31)    Cholesterol, Serum: 171 mg/dL    Triglycerides, Serum: 61 mg/dL    HDL Cholesterol, Serum: 58 mg/dL    Non HDL Cholesterol: 113: Patient’s Atherosclerotic Cardiovascular Disease (ASCVD) Risk  Optimal Level (mg/dL)  LDL Cholesterol (LDL-C)  All Patients                                < 100  ASCVD at Very High Risk1    < 70  Non-HDL Cholesterol (Non-HDL-C)  All Patients                        < 130  ASCVD at Very High Risk1   < 100  Non-HDL-Cholesterol (Non-HDL-C) is also a key target for cardiovascular  risk reduction.  Consider Familial Hypercholesterolemia when: LDL-C > 190 mg/dL or  Non-HDL-C > 220 mg/dL.  LDL-C calculation using the Friedewald equation is not provided when  triglycerides > 400 mg/dL, in which case we recommend repeating the test  after fasting, if it was not done before.  When triglycerides >150 mg/dL, calculated LDL-C is provided but may still  be inaccurate (particularly when LDL-C < 70 mg/dL). It can be  recalculated off-line using other equations (e.g. Denver SS, Alejandra MJ,  Heather MB, et al.JOSS 2013;310:2061 - 8).  1 Guero Mason.,et al. "2019 AHA/ACC. . . guideline on the  management of blood cholesterol: a report of the American College of  Cardiology/American Heart Association Task Force on Clinical Practice  Guidelines." Circulation;139:e1082 - e1143.  These values apply only to persons 20 years and older.  Lipid Panel updated with new test, reference ranges and interpretive  comments effective 10-. mg/dL    LDL Cholesterol Calculated: 101 mg/dL      RADIOLOGY & ADDITIONAL TESTS:  Results Reviewed:   < from: Xray Knee 4 Views, Left (21 @ 12:31) >    FINDINGS/  IMPRESSION:  There is no fracture or dislocation. No knee effusion  The joint spaces are preserved.  No abnormal soft tissue swelling or mineralization.    < end of copied text >    < from: CT Angio Head w/ IV Cont (21 @ 23:06) >  IMPRESSION:    CT HEAD: Age-indeterminate lacunar infarct in the left thalamus. No acute intracranial hemorrhage.    CTA BRAIN: Degraded exam. No evidence of proximal vessel occlusion or flow-limiting stenosis.    CTA NECK: Degraded exam, with essentially nondiagnostic evaluation of the vertebral arteries. No occlusion or flow-limiting stenosis in the carotid arteries.

## 2021-05-19 LAB
ANION GAP SERPL CALC-SCNC: 11 MMOL/L — SIGNIFICANT CHANGE UP (ref 7–14)
BUN SERPL-MCNC: 23 MG/DL — SIGNIFICANT CHANGE UP (ref 7–23)
CALCIUM SERPL-MCNC: 10 MG/DL — SIGNIFICANT CHANGE UP (ref 8.4–10.5)
CHLORIDE SERPL-SCNC: 102 MMOL/L — SIGNIFICANT CHANGE UP (ref 98–107)
CO2 SERPL-SCNC: 23 MMOL/L — SIGNIFICANT CHANGE UP (ref 22–31)
CREAT SERPL-MCNC: 1.22 MG/DL — SIGNIFICANT CHANGE UP (ref 0.5–1.3)
GLUCOSE SERPL-MCNC: 124 MG/DL — HIGH (ref 70–99)
HCT VFR BLD CALC: 45.6 % — HIGH (ref 34.5–45)
HGB BLD-MCNC: 14.7 G/DL — SIGNIFICANT CHANGE UP (ref 11.5–15.5)
MAGNESIUM SERPL-MCNC: 2.2 MG/DL — SIGNIFICANT CHANGE UP (ref 1.6–2.6)
MCHC RBC-ENTMCNC: 28.3 PG — SIGNIFICANT CHANGE UP (ref 27–34)
MCHC RBC-ENTMCNC: 32.2 GM/DL — SIGNIFICANT CHANGE UP (ref 32–36)
MCV RBC AUTO: 87.7 FL — SIGNIFICANT CHANGE UP (ref 80–100)
NRBC # BLD: 0 /100 WBCS — SIGNIFICANT CHANGE UP
NRBC # FLD: 0 K/UL — SIGNIFICANT CHANGE UP
PHOSPHATE SERPL-MCNC: 3.2 MG/DL — SIGNIFICANT CHANGE UP (ref 2.5–4.5)
PLATELET # BLD AUTO: 299 K/UL — SIGNIFICANT CHANGE UP (ref 150–400)
POTASSIUM SERPL-MCNC: 3.6 MMOL/L — SIGNIFICANT CHANGE UP (ref 3.5–5.3)
POTASSIUM SERPL-SCNC: 3.6 MMOL/L — SIGNIFICANT CHANGE UP (ref 3.5–5.3)
RBC # BLD: 5.2 M/UL — SIGNIFICANT CHANGE UP (ref 3.8–5.2)
RBC # FLD: 15.5 % — HIGH (ref 10.3–14.5)
SODIUM SERPL-SCNC: 136 MMOL/L — SIGNIFICANT CHANGE UP (ref 135–145)
WBC # BLD: 5.61 K/UL — SIGNIFICANT CHANGE UP (ref 3.8–10.5)
WBC # FLD AUTO: 5.61 K/UL — SIGNIFICANT CHANGE UP (ref 3.8–10.5)

## 2021-05-19 PROCEDURE — 99233 SBSQ HOSP IP/OBS HIGH 50: CPT

## 2021-05-19 PROCEDURE — 99222 1ST HOSP IP/OBS MODERATE 55: CPT

## 2021-05-19 RX ORDER — NIFEDIPINE 30 MG
30 TABLET, EXTENDED RELEASE 24 HR ORAL ONCE
Refills: 0 | Status: COMPLETED | OUTPATIENT
Start: 2021-05-19 | End: 2021-05-19

## 2021-05-19 RX ORDER — POLYETHYLENE GLYCOL 3350 17 G/17G
17 POWDER, FOR SOLUTION ORAL DAILY
Refills: 0 | Status: DISCONTINUED | OUTPATIENT
Start: 2021-05-19 | End: 2021-06-01

## 2021-05-19 RX ORDER — HYDROCHLOROTHIAZIDE 25 MG
50 TABLET ORAL
Refills: 0 | Status: DISCONTINUED | OUTPATIENT
Start: 2021-05-19 | End: 2021-06-01

## 2021-05-19 RX ORDER — NIFEDIPINE 30 MG
60 TABLET, EXTENDED RELEASE 24 HR ORAL
Refills: 0 | Status: DISCONTINUED | OUTPATIENT
Start: 2021-05-20 | End: 2021-05-21

## 2021-05-19 RX ADMIN — SODIUM CHLORIDE 3 MILLILITER(S): 9 INJECTION INTRAMUSCULAR; INTRAVENOUS; SUBCUTANEOUS at 13:28

## 2021-05-19 RX ADMIN — Medication 30 MILLIGRAM(S): at 12:36

## 2021-05-19 RX ADMIN — SODIUM CHLORIDE 3 MILLILITER(S): 9 INJECTION INTRAMUSCULAR; INTRAVENOUS; SUBCUTANEOUS at 21:20

## 2021-05-19 RX ADMIN — Medication 81 MILLIGRAM(S): at 12:36

## 2021-05-19 RX ADMIN — ATORVASTATIN CALCIUM 40 MILLIGRAM(S): 80 TABLET, FILM COATED ORAL at 21:12

## 2021-05-19 RX ADMIN — ENOXAPARIN SODIUM 40 MILLIGRAM(S): 100 INJECTION SUBCUTANEOUS at 05:19

## 2021-05-19 RX ADMIN — POLYETHYLENE GLYCOL 3350 17 GRAM(S): 17 POWDER, FOR SOLUTION ORAL at 08:32

## 2021-05-19 RX ADMIN — Medication 50 MILLIGRAM(S): at 18:25

## 2021-05-19 RX ADMIN — ENOXAPARIN SODIUM 40 MILLIGRAM(S): 100 INJECTION SUBCUTANEOUS at 18:24

## 2021-05-19 RX ADMIN — SODIUM CHLORIDE 3 MILLILITER(S): 9 INJECTION INTRAMUSCULAR; INTRAVENOUS; SUBCUTANEOUS at 05:36

## 2021-05-19 RX ADMIN — Medication 30 MILLIGRAM(S): at 05:18

## 2021-05-19 NOTE — CONSULT NOTE ADULT - SUBJECTIVE AND OBJECTIVE BOX
Patient is a 60y old  Female who presents with a chief complaint of Pt had fallen after sudden weakness in left leg walking up steps. (18 May 2021 12:45)      HPI:  Pt is a  59 yo female with PMHx of Hypertension and DM, Osteoarthritis and B/L Carpel Tunnel.  She denies CAD.  She was seen, treated and discharged from the ER at [facility] yesterday for c/o a few months of leg weakness and  left knee pain  (21) and upon walking up steps at home, on the 4th step her left leg went totally weak and she sat on the step.  She denies a true fall or hurting her hip, head or any other body part.  She denies associated dizziness, back, hip, knee or ankle pain prior to the left leg weakness. She came back to the ER for repeat evaluation.    CTH & CT angio H&N showed: CT HEAD: Age-indeterminate lacunar infarct in the left thalamus. No acute intracranial hemorrhage.  CTA BRAIN: Degraded exam. No evidence of proximal vessel occlusion or flow-limiting stenosis.  CTA NECK: Degraded exam, with essentially nondiagnostic evaluation of the vertebral arteries. No occlusion or flow-limiting stenosis in the carotid arteries.  CT Left HIP: PRELIM - No acute fracture. Follow up final report.  X-ray KNEE: There is no fracture or dislocation. No knee effusion.  The joint spaces are preserved. No abnormal soft tissue swelling or mineralization.    Admission for further workup is recommended.        (17 May 2021 02:42)  MRI head and c spine pending to rule out acute cva and due to upper extremity weakness.  Patient admitted on .  Hypertensive yesterday     REVIEW OF SYSTEMS  Constitutional - No fever, No weight loss, No fatigue  HEENT - No eye pain, No visual disturbances, No difficulty hearing, No tinnitus, No vertigo, No neck pain  Respiratory - No cough, No wheezing, No shortness of breath  Cardiovascular - No chest pain, No palpitations  Gastrointestinal - No abdominal pain, No nausea, No vomiting, No diarrhea, No constipation  Genitourinary - No dysuria, No frequency, No hematuria, No incontinence  Neurological - No headaches, No memory loss, + loss of strength, No numbness, No tremors  Skin - No itching, No rashes, No lesions   Endocrine - No temperature intolerance  Musculoskeletal - No joint pain, No joint swelling, No muscle pain  Psychiatric - No depression, No anxiety    PAST MEDICAL & SURGICAL HISTORY  Diabetes mellitus    Hypertension    Morbid obesity    Morbid obesity with BMI of 60.0-69.9, adult    Type 2 diabetes mellitus    Endometrial hyperplasia    History of  section        SOCIAL HISTORY  Smoking - Denied  EtOH - Denied   Drugs - Denied    FUNCTIONAL HISTORY  Lives with children in house with 5 stairs to enter  Independent at baseline for ambulation and adls    CURRENT FUNCTIONAL STATUS    Bed Mobility: Rolling/Turning:     · Level of [place]	minimum assist (75% patients effort)  · Physical Assist/Nonphysical Assist	1 person assist  · Assistive Device	bed rails    Bed Mobility: Sit to Supine:     · Level of [place]	dependent (less than 25% patients effort)  · Physical Assist/Nonphysical Assist	1 person assist  · Assistive Device	bed rails    Bed Mobility: Supine to Sit:     · Level of [place]	dependent (less than 25% patients effort); Unable to attain full upright sitting  · Physical Assist/Nonphysical Assist	1 person assist  · Assistive Device	bed rails    Clinical Impressions:   · Therapy Frequency	3-5x/week  · Anticipated Equipment Needs at Discharge	rolling walker (5 inch wheels)  · Anticipated Discharge Recommendations	rehabilitation facility; Acute Rehab  	    General Interventions:   · Planned Therapy Interventions	bed mobility training; gait training; transfer training      FAMILY HISTORY   Family history of stroke (Mother)        RECENT LABS/IMAGING  CBC Full  -  ( 19 May 2021 07:06 )  WBC Count : 5.61 K/uL  RBC Count : 5.20 M/uL  Hemoglobin : 14.7 g/dL  Hematocrit : 45.6 %  Platelet Count - Automated : 299 K/uL  Mean Cell Volume : 87.7 fL  Mean Cell Hemoglobin : 28.3 pg  Mean Cell Hemoglobin Concentration : 32.2 gm/dL  Auto Neutrophil # : x  Auto Lymphocyte # : x  Auto Monocyte # : x  Auto Eosinophil # : x  Auto Basophil # : x  Auto Neutrophil % : x  Auto Lymphocyte % : x  Auto Monocyte % : x  Auto Eosinophil % : x  Auto Basophil % : x        136  |  102  |  23  ----------------------------<  124<H>  3.6   |  23  |  1.22    Ca    10.0      19 May 2021 07:06  Phos  3.2       Mg     2.2               VITALS  T(C): 36.5 (21 @ 05:20), Max: 36.6 (21 @ 01:20)  HR: 69 (21 @ 05:20) (68 - 81)  BP: 191/84 (21 @ 05:20) (168/98 - 208/89)  RR: 18 (21 @ 05:20) (17 - 18)  SpO2: 99% (21 @ 05:20) (98% - 100%)  Wt(kg): --    ALLERGIES  No Known Allergies      MEDICATIONS   aspirin enteric coated 81 milliGRAM(s) Oral daily  atorvastatin 40 milliGRAM(s) Oral at bedtime  dextrose 40% Gel 15 Gram(s) Oral once  dextrose 5%. 1000 milliLiter(s) IV Continuous <Continuous>  dextrose 5%. 1000 milliLiter(s) IV Continuous <Continuous>  dextrose 50% Injectable 25 Gram(s) IV Push once  dextrose 50% Injectable 12.5 Gram(s) IV Push once  dextrose 50% Injectable 25 Gram(s) IV Push once  enoxaparin Injectable 40 milliGRAM(s) SubCutaneous every 12 hours  glucagon  Injectable 1 milliGRAM(s) IntraMuscular once  hydrochlorothiazide 50 milliGRAM(s) Oral <User Schedule>  insulin lispro (ADMELOG) corrective regimen sliding scale   SubCutaneous three times a day before meals  insulin lispro (ADMELOG) corrective regimen sliding scale   SubCutaneous at bedtime  NIFEdipine XL 30 milliGRAM(s) Oral daily  polyethylene glycol 3350 17 Gram(s) Oral daily  sodium chloride 0.9% lock flush 3 milliLiter(s) IV Push every 8 hours      ----------------------------------------------------------------------------------------  PHYSICAL EXAM  Constitutional - NAD, Comfortable  HEENT - NCAT, EOMI  Neck - Supple, No limited ROM  Chest - CTA bilaterally, No wheeze, No rhonchi, No crackles  Cardiovascular - RRR, S1S2, No murmurs  Abdomen - BS+, Soft, NTND  Extremities - No C/C/E, No calf tenderness   Neurologic Exam -                    Cognitive - Awake, Alert, AAO to self, place, date, year, situation     Communication - Fluent, No dysarthria     Cranial Nerves - CN 2-12 intact     Motor - No focal deficits                    LEFT    UE - ShAB 5/5, EF 5/5, EE 5/5, WE 5/5,  5/5                    RIGHT UE - ShAB 5/5, EF 5/5, EE 5/5, WE 5/5,  5/5                    LEFT    LE - HF 5/5, KE 5/5, DF 5/5, PF 5/5                    RIGHT LE - HF 5/5, KE 5/5, DF 5/5, PF 5/5        Sensory - Intact to LT     Reflexes - DTR Intact, No primitive reflexive     Coordination - FTN intact     OculoVestibular - No saccades, No nystagmus, VOR         Balance - WNL Static  Psychiatric - Mood stable, Affect WNL  ----------------------------------------------------------------------------------------  ASSESSMENT/PLAN    Pain -  DVT PPX -   Rehab -      incomplete note Patient is a 60y old  Female who presents with a chief complaint of Pt had fallen after sudden weakness in left leg walking up steps. (18 May 2021 12:45)      HPI:  Pt is a  59 yo female with PMHx of Hypertension and DM, Osteoarthritis and B/L Carpel Tunnel.  She denies CAD.  She was seen, treated and discharged from the ER at [facility] yesterday for c/o a few months of leg weakness and  left knee pain  (21) and upon walking up steps at home, on the 4th step her left leg went totally weak and she sat on the step.  She denies a true fall or hurting her hip, head or any other body part.  She denies associated dizziness, back, hip, knee or ankle pain prior to the left leg weakness. She came back to the ER for repeat evaluation.    CTH & CT angio H&N showed: CT HEAD: Age-indeterminate lacunar infarct in the left thalamus. No acute intracranial hemorrhage.  CTA BRAIN: Degraded exam. No evidence of proximal vessel occlusion or flow-limiting stenosis.  CTA NECK: Degraded exam, with essentially nondiagnostic evaluation of the vertebral arteries. No occlusion or flow-limiting stenosis in the carotid arteries.  CT Left HIP: PRELIM - No acute fracture. Follow up final report.  X-ray KNEE: There is no fracture or dislocation. No knee effusion.  The joint spaces are preserved. No abnormal soft tissue swelling or mineralization.    Admission for further workup is recommended.        (17 May 2021 02:42)  MRI head and c spine pending to rule out acute cva and due to upper extremity weakness.  Patient admitted on .  Hypertensive yesterday /  Denies pain.  reports last bm was 5 days ago  reports blurry vision which began about 2 weeks ago    REVIEW OF SYSTEMS  Constitutional - No fever, No weight loss, No fatigue  HEENT - No eye pain, + visual disturbances, No difficulty hearing, No tinnitus, No vertigo, No neck pain  Respiratory - No cough, No wheezing, No shortness of breath  Cardiovascular - No chest pain, No palpitations  Gastrointestinal - No abdominal pain, No nausea, No vomiting, No diarrhea, + constipation  Genitourinary - No dysuria, No frequency, No hematuria, No incontinence  Neurological - No headaches, No memory loss, + loss of strength, + numbness, No tremors  Skin - No itching, No rashes, No lesions   Endocrine - No temperature intolerance  Musculoskeletal - No joint pain, No joint swelling, No muscle pain  Psychiatric - No depression, No anxiety    PAST MEDICAL & SURGICAL HISTORY  Diabetes mellitus    Hypertension    Morbid obesity    Morbid obesity with BMI of 60.0-69.9, adult    Type 2 diabetes mellitus    Endometrial hyperplasia    History of  section        SOCIAL HISTORY  Smoking - Denied  EtOH - Denied   Drugs - Denied    FUNCTIONAL HISTORY  Lives with children in 2 story house with 5 stairs to enter, 1 flight inside but stays on main level  Independent at baseline for ambulation and adls  retired, worked for the [employer]    CURRENT FUNCTIONAL STATUS    Bed Mobility: Rolling/Turning:     · Level of [place]	minimum assist (75% patients effort)  · Physical Assist/Nonphysical Assist	1 person assist  · Assistive Device	bed rails    Bed Mobility: Sit to Supine:     · Level of [place]	dependent (less than 25% patients effort)  · Physical Assist/Nonphysical Assist	1 person assist  · Assistive Device	bed rails    Bed Mobility: Supine to Sit:     · Level of [place]	dependent (less than 25% patients effort); Unable to attain full upright sitting  · Physical Assist/Nonphysical Assist	1 person assist  · Assistive Device	bed rails    Clinical Impressions:   · Therapy Frequency	3-5x/week  · Anticipated Equipment Needs at Discharge	rolling walker (5 inch wheels)  · Anticipated Discharge Recommendations	rehabilitation facility; Acute Rehab  	    General Interventions:   · Planned Therapy Interventions	bed mobility training; gait training; transfer training      FAMILY HISTORY   Family history of stroke (Mother)        RECENT LABS/IMAGING  CBC Full  -  ( 19 May 2021 07:06 )  WBC Count : 5.61 K/uL  RBC Count : 5.20 M/uL  Hemoglobin : 14.7 g/dL  Hematocrit : 45.6 %  Platelet Count - Automated : 299 K/uL  Mean Cell Volume : 87.7 fL  Mean Cell Hemoglobin : 28.3 pg  Mean Cell Hemoglobin Concentration : 32.2 gm/dL  Auto Neutrophil # : x  Auto Lymphocyte # : x  Auto Monocyte # : x  Auto Eosinophil # : x  Auto Basophil # : x  Auto Neutrophil % : x  Auto Lymphocyte % : x  Auto Monocyte % : x  Auto Eosinophil % : x  Auto Basophil % : x    05-19    136  |  102  |  23  ----------------------------<  124<H>  3.6   |  23  |  1.22    Ca    10.0      19 May 2021 07:06  Phos  3.2     05-19  Mg     2.2     05-19          VITALS  T(C): 36.5 (21 @ 05:20), Max: 36.6 (21 @ 01:20)  HR: 69 (21 @ 05:20) (68 - 81)  BP: 191/84 (21 @ 05:20) (168/98 - 208/89)  RR: 18 (21 @ 05:20) (17 - 18)  SpO2: 99% (21 @ 05:20) (98% - 100%)  Wt(kg): --    ALLERGIES  No Known Allergies      MEDICATIONS   aspirin enteric coated 81 milliGRAM(s) Oral daily  atorvastatin 40 milliGRAM(s) Oral at bedtime  dextrose 40% Gel 15 Gram(s) Oral once  dextrose 5%. 1000 milliLiter(s) IV Continuous <Continuous>  dextrose 5%. 1000 milliLiter(s) IV Continuous <Continuous>  dextrose 50% Injectable 25 Gram(s) IV Push once  dextrose 50% Injectable 12.5 Gram(s) IV Push once  dextrose 50% Injectable 25 Gram(s) IV Push once  enoxaparin Injectable 40 milliGRAM(s) SubCutaneous every 12 hours  glucagon  Injectable 1 milliGRAM(s) IntraMuscular once  hydrochlorothiazide 50 milliGRAM(s) Oral <User Schedule>  insulin lispro (ADMELOG) corrective regimen sliding scale   SubCutaneous three times a day before meals  insulin lispro (ADMELOG) corrective regimen sliding scale   SubCutaneous at bedtime  NIFEdipine XL 30 milliGRAM(s) Oral daily  polyethylene glycol 3350 17 Gram(s) Oral daily  sodium chloride 0.9% lock flush 3 milliLiter(s) IV Push every 8 hours      ----------------------------------------------------------------------------------------  PHYSICAL EXAM  Constitutional - NAD, Comfortable  HEENT - NCAT, EOMI  Neck - Supple, No limited ROM  Chest -no respiratory distress  Cardiovascular - RRR, S1S2   Abdomen -  Soft, NTND  Extremities - No C/C/E, No calf tenderness   Neurologic Exam -                    Cognitive - Awake, Alert, AAO to self, place, date, year, situation     Communication - Fluent, No dysarthria     Cranial Nerves - CN 2-12 intact     Motor -                      LEFT    UE - ShAB 0/5, EF 3/5, EE 2/5, WE 3/5,  3/5                    RIGHT UE - ShAB 5/5, EF 5/5, EE 5/5, WE 5/5,  5/5                    LEFT    LE - HF 0/5, KE 0/5, DF 0/5, PF 0/5                    RIGHT LE - HF 5/5, KE 5/5, DF 5/5, PF 5/5        Sensory - Intact to LT except for LLE decreased to lt     Reflexes - DTR Intact b/l UE     Coordination - FTN intact on the right, unable on the left due to weakness     OculoVestibular - No saccades, No nystagmus, VOR         Balance - WNL Static  Psychiatric - Mood stable, Affect WNL  ----------------------------------------------------------------------------------------  ASSESSMENT/PLAN   59 yo RHD female with PMHx of Hypertension and DM, Osteoarthritis presented with left LE and left UE weakness, possible cva.  chronic lacunar infarct left thalamus detected on incomplete mri exam.   MRI attempted but incomplete, consider reattempt  consider CT c and l spine if unable to obtain MRI  possible cva: on asa, statin  constipation: miralax, increase bowel regimen as needed  Pain -denies  DVT PPX - lovenox  diet: regular consistent carb dash/tlc  slp- patient reports difficulty pronouncing words at times  htn: hctz, procardia  Rehab -    pending progress with bedside therapy and mri results, recommend acute inpatient rehab when medically cleared. Patient can tolerate 3 hours per day of therapy with medical supervision. Diagnosis pending, possible cva

## 2021-05-19 NOTE — PROGRESS NOTE ADULT - PROBLEM SELECTOR PLAN 5
- DVT PPx - IMPROVE score - 3, At Risk -  Lovenox 40mg Q12hrs for BMI >35  - Anticipate dc to acute rehab, awaiting MRI Head, C-Spine and CT L spine

## 2021-05-19 NOTE — PROGRESS NOTE ADULT - SUBJECTIVE AND OBJECTIVE BOX
[facility] Division of Hospital Medicine  Daniel Mancilla DO  Pager (RILEY, 8V-1H): [phone]    Patient is a 60y old  Female who presents with a chief complaint of Pt had fallen after sudden weakness in left leg walking up steps. (19 May 2021 09:24)    SUBJECTIVE / OVERNIGHT EVENTS: Pt remains with LLE weakness, LUE weakness however improving and has more range of motion.  Denies HA/CP/SOB.  Hasn't had a BM in a few days however took stool softeners this AM and feels an urge to pass her bowels.  Pt admits that she hasn't been taking BP meds regularly because she feels that the medications don't work; she is now in agremeent with taking meds regularly and understands that BP mgmt is not resolved immediately.     MEDICATIONS  (STANDING):  aspirin enteric coated 81 milliGRAM(s) Oral daily  atorvastatin 40 milliGRAM(s) Oral at bedtime  dextrose 40% Gel 15 Gram(s) Oral once  dextrose 5%. 1000 milliLiter(s) (50 mL/Hr) IV Continuous <Continuous>  dextrose 5%. 1000 milliLiter(s) (100 mL/Hr) IV Continuous <Continuous>  dextrose 50% Injectable 25 Gram(s) IV Push once  dextrose 50% Injectable 12.5 Gram(s) IV Push once  dextrose 50% Injectable 25 Gram(s) IV Push once  enoxaparin Injectable 40 milliGRAM(s) SubCutaneous every 12 hours  glucagon  Injectable 1 milliGRAM(s) IntraMuscular once  hydrochlorothiazide 50 milliGRAM(s) Oral <User Schedule>  insulin lispro (ADMELOG) corrective regimen sliding scale   SubCutaneous three times a day before meals  insulin lispro (ADMELOG) corrective regimen sliding scale   SubCutaneous at bedtime  NIFEdipine XL 30 milliGRAM(s) Oral daily  polyethylene glycol 3350 17 Gram(s) Oral daily  sodium chloride 0.9% lock flush 3 milliLiter(s) IV Push every 8 hours    MEDICATIONS  (PRN):  LORazepam     Tablet 1 milliGRAM(s) Oral once PRN on call to MRI      CAPILLARY BLOOD GLUCOSE      POCT Blood Glucose.: 105 mg/dL (19 May 2021 12:20)  POCT Blood Glucose.: 129 mg/dL (19 May 2021 08:40)  POCT Blood Glucose.: 114 mg/dL (18 May 2021 21:58)  POCT Blood Glucose.: 114 mg/dL (18 May 2021 17:33)    I&O's Summary    18 May 2021 07:01  -  19 May 2021 07:00  --------------------------------------------------------  IN: 720 mL / OUT: 800 mL / NET: -80 mL        PHYSICAL EXAM:  Vital Signs Last 24 Hrs  T(C): 36.7 (19 May 2021 12:00), Max: 36.7 (19 May 2021 08:00)  T(F): 98 (19 May 2021 12:00), Max: 98 (19 May 2021 08:00)  HR: 66 (19 May 2021 12:00) (66 - 77)  BP: 208/90 (19 May 2021 12:00) (183/85 - 208/90)  BP(mean): --  RR: 18 (19 May 2021 12:00) (17 - 18)  SpO2: 100% (19 May 2021 12:00) (99% - 100%)    CONSTITUTIONAL: NAD, well-developed, well-groomed  EYES: PERRLA; conjunctiva and sclera clear  RESPIRATORY: Normal respiratory effort; lungs are clear to auscultation bilaterally  CARDIOVASCULAR: Distant heart sounds, no murmur/rub/gallop; No lower extremity edema  ABDOMEN: Nontender to palpation, normoactive bowel sounds, no rebound/guarding  MUSCULOSKELETAL:  No clubbing or cyanosis of digits; no joint swelling or tenderness to palpation  PSYCH: A+O to person, place, and time; affect appropriate  NEUROLOGY: CN 2-12 are intact and symmetric; no gross sensory deficits; LLE 0/5 motor function, LUE 4/5 motor  SKIN: No rashes; no palpable lesions    LABS:                        14.7   5.61  )-----------( 299      ( 19 May 2021 07:06 )             45.6     05-19    136  |  102  |  23  ----------------------------<  124<H>  3.6   |  23  |  1.22    Ca    10.0      19 May 2021 07:06  Phos  3.2     05-19  Mg     2.2     05-19      RADIOLOGY & ADDITIONAL TESTS:  Results Reviewed:   < from: MR Head No Cont (05.18.21 @ 13:31) >  INDINGS:  Incomplete exam dueto technical failure. Only the sagittal and axial T1 sequences obtained.    No midline shift. No gross mass effect. No hydrocephalus. Probable chronic lacunar infarct left thalamus    Nonspecific diffuse low T1 bone marrow signal noted which could berelated to red marrow hyperplasia in the correct clinical setting.    IMPRESSION:  Incomplete exam. Repeat exam recommended.    < end of copied text >

## 2021-05-19 NOTE — PROGRESS NOTE ADULT - SUBJECTIVE AND OBJECTIVE BOX
Neurology Progress Note    S: Patient seen and examined. No new events overnight. patient denied CP, SOB, HA or pain. awaiting imaging. still wtih LE weakness. MR attempted but incomplete.       Medication:  MEDICATIONS  (STANDING):  aspirin enteric coated 81 milliGRAM(s) Oral daily  atorvastatin 40 milliGRAM(s) Oral at bedtime  dextrose 40% Gel 15 Gram(s) Oral once  dextrose 5%. 1000 milliLiter(s) (50 mL/Hr) IV Continuous <Continuous>  dextrose 5%. 1000 milliLiter(s) (100 mL/Hr) IV Continuous <Continuous>  dextrose 50% Injectable 25 Gram(s) IV Push once  dextrose 50% Injectable 12.5 Gram(s) IV Push once  dextrose 50% Injectable 25 Gram(s) IV Push once  enoxaparin Injectable 40 milliGRAM(s) SubCutaneous every 12 hours  glucagon  Injectable 1 milliGRAM(s) IntraMuscular once  hydrochlorothiazide 50 milliGRAM(s) Oral <User Schedule>  insulin lispro (ADMELOG) corrective regimen sliding scale   SubCutaneous three times a day before meals  insulin lispro (ADMELOG) corrective regimen sliding scale   SubCutaneous at bedtime  NIFEdipine XL 30 milliGRAM(s) Oral daily  polyethylene glycol 3350 17 Gram(s) Oral daily  sodium chloride 0.9% lock flush 3 milliLiter(s) IV Push every 8 hours    MEDICATIONS  (PRN):      Vitals:  Vital Signs Last 24 Hrs  T(C): 36.7 (19 May 2021 08:00), Max: 36.7 (19 May 2021 08:00)  T(F): 98 (19 May 2021 08:00), Max: 98 (19 May 2021 08:00)  HR: 74 (19 May 2021 08:00) (68 - 81)  BP: 183/85 (19 May 2021 08:00) (168/98 - 208/89)  BP(mean): --  RR: 18 (19 May 2021 08:00) (17 - 18)  SpO2: 100% (19 May 2021 08:00) (98% - 100%)  General:  Constitutional: Obese Female, appears stated age, in no apparent distress including pain  Head: Normocephalic & atraumatic.    Neurological (>12):  MS: Awake, alert, oriented to person, place, situation, time. Normal affect. Follows all commands.    Language: Speech is clear, fluent with good repetition & comprehension.    CNs: PERRLA (R = 3mm, L = 3mm). VF intact in all 4 quadrants, EOMI no nystagmus. Some reduced sensation to pinprick on the L side of the face, intact to LT throughout, well developed masseter muscles b/l. No facial asymmetry b/l, full eye closure strength b/l. Symmetric palate elevation in midline. Shoulder shrug intact b/l. Tongue midline, normal movements, no atrophy.    Motor: Normal muscle bulk & tone. No noticeable tremor or seizure. Noted L arm drift.                 Deltoid	Biceps	Triceps	   R	5	5	5	5  L	4+	4+	4+	4+	    	H-Flex	H-Ext	H-ABd	H-ADd	K-Flex	K-Ext	D-Flex	P-Flex  R	5	5	5	5	5	5	5	5 	   L	4-	4-	4	4	4+	4-	5	4+	     Sensation: Reduced sensation to pinprick in the distal L leg and somewhat on the lateral thigh. Reduced sensation to pinprick on the distal L arm. Intact to vibratory sense and proprioception throughout.    Reflexes:              Biceps(C5)       BR(C6)     Triceps(C7)               Patellar(L4)    Achilles(S1)    Plantar Resp  R	2	          2	             2		        0		    1		Mute   L	2	          2	             2		        0		    1		Mute     Coordination: No dysmetria to FTN    Gait: Deferred    I personally reviewed the below data/images/labs:      CBC Full  -  ( 19 May 2021 07:06 )  WBC Count : 5.61 K/uL  RBC Count : 5.20 M/uL  Hemoglobin : 14.7 g/dL  Hematocrit : 45.6 %  Platelet Count - Automated : 299 K/uL  Mean Cell Volume : 87.7 fL  Mean Cell Hemoglobin : 28.3 pg  Mean Cell Hemoglobin Concentration : 32.2 gm/dL  Auto Neutrophil # : x  Auto Lymphocyte # : x  Auto Monocyte # : x  Auto Eosinophil # : x  Auto Basophil # : x  Auto Neutrophil % : x  Auto Lymphocyte % : x  Auto Monocyte % : x  Auto Eosinophil % : x  Auto Basophil % : x    05-19    136  |  102  |  23  ----------------------------<  124<H>  3.6   |  23  |  1.22    Ca    10.0      19 May 2021 07:06  Phos  3.2     05-19  Mg     2.2     05-19           PTT - ( 16 May 2021 18:56 )  PTT:37.4 sec  Urinalysis Basic - ( 17 May 2021 06:51 )    Color: Light Yellow / Appearance: Clear / S.037 / pH: x  Gluc: x / Ketone: Small  / Bili: Negative / Urobili: <2 mg/dL   Blood: x / Protein: Trace / Nitrite: Negative   Leuk Esterase: Negative / RBC: 0 /HPF / WBC 1 /HPF   Sq Epi: x / Non Sq Epi: Occasional / Bacteria: Few      < from: CT Hip No Cont, Left (21 @ 23:06) >    EXAM:  CT HIP ONLY LT        PROCEDURE DATE:  May 16 2021         INTERPRETATION:  HISTORY: Left-sided hip pain.    Helical CT imaging of the left hip was performed without intravenous contrast. Sagittal and coronal reformats were provided. 3-D reformats were performed on a separate workstation.    Correlation is made to prior radiographs from a 2021.    FINDINGS:    There is no evidence of acute fracture or dislocation. There is severe left hip arthrosis with prominent subchondral cystic change along the posterior margin of the articulation. There is mild bilateral sacral iliac joint arthrosis.    There is no subcutaneous or intramuscular hematoma. There is a calcified uterine fibroid. There is colonic diverticulosis.    IMPRESSION:    Severe left hip arthrosis.    No evidence of acute fracture or dislocation.              ANNE VIDALES MD; Attending Radiologist  This document has been electronically signed. May 17 2021  8:50AM    < end of copied text >  < from: CT Angio Head w/ IV Cont (21 @ 23:06) >    EXAM:  CT ANGIO BRAIN (W)AW IC      EXAM:  CT ANGIO NECK (W)AW IC        PROCEDURE DATE:  May 16 2021         INTERPRETATION:  HISTORY: Left lower extremity weakness and slurred speech, for 2-3 days    COMPARISON: No similar prior    TECHNIQUE: Noncontrast CT head and CT angiogram of the neck and brain was performed after administration of intravenous contrast. MIP and 3D reconstructions were performed.    Total of 90 cc Omnipaque 350 intravenous contrast were administered without complication.10 cc discarded.    FINDINGS:    CT HEAD:    No acute intracranial hemorrhage, or CT evidence of acute, large territorial infarct. There is an age-indeterminate lacunar infarct in the left thalamus.. No hydrocephalus. Basilar cisterns are clear. Scalp and imaged midfacial soft tissues are unremarkable. Calvarium is intact.    CTA BRAIN  This study is degraded by venous contamination.    INTERNAL CAROTID ARTERIES:  The intracranial segments of the ICA are patent without hemodynamically significantstenosis, occlusion, or aneurysm. The ICA bifurcations are unremarkable.    ANTERIOR CEREBRAL ARTERIES: No proximal flow-limiting stenosis or occlusion. Anterior communicating artery is unremarkable without aneurysm.    MIDDLE CEREBRAL ARTERIES: No proximal flow-limiting stenosis or occlusion. MCA bifurcations are unremarkable without aneurysm.    POSTERIOR CEREBRAL ARTERIES: No proximal flow-limiting stenosis or occlusion. Posterior communicating artery is well-developed on the right.    VERTEBROBASILAR SYSTEM: The right vertebral artery predominantly terminates in the PICA. The basilar flow is predominantly supplied by the left vertebral artery. The distal left V4 segment demonstrates short segment of attenuated flow, estimated at 50%. The basilar tip is unremarkable    CTA NECK  This study is degraded by combination of streak artifact from the patient's body habitus and shoulder positioning as well and motion.    RIGHT CAROTID SYSTEM: Normal in course and caliber without flow-limiting stenosis or occlusion.    LEFT CAROTID SYSTEM: Normal in course and caliber without flow-limiting stenosis or occlusion.    VERTEBRAL SYSTEM: Very limited evaluation of the origin and majority of the cervical portions of the bilateral vertebral arteries due to the above limitations. The arteries are grossly patent.    AORTIC ARCH: Typical three-vessel arch..    IMPRESSION:    CT HEAD: Age-indeterminate lacunar infarct in the left thalamus. No acute intracranial hemorrhage.    CTA BRAIN: Degraded exam. No evidence of proximal vessel occlusion or flow-limiting stenosis.    CTA NECK: Degraded exam, with essentially nondiagnostic evaluation of the vertebral arteries. No occlusion or flow-limiting stenosis in the carotid arteries.            SINDI ESQUEDA MD, Resident Radiology  This document has been electronically signed.  ASUNCION GARRIDO MD; Attending Radiologist  This document has been electronically signed. May 16 2021 11:51PM    < end of copied text >  < from: MR Head No Cont (05.. @ 13:31) >    EXAM:  MR BRAIN        PROCEDURE DATE:  May 18 2021         INTERPRETATION:  CLINICAL STATEMENT: Left-sided weakness    TECHNIQUE: MRI of the brain was performed without gadolinium.    COMPARISON: CT head 2021    FINDINGS:  Incomplete exam dueto technical failure. Only the sagittal and axial T1 sequences obtained.    No midline shift. No gross mass effect. No hydrocephalus. Probable chronic lacunar infarct left thalamus    Nonspecific diffuse low T1 bone marrow signal noted which could berelated to red marrow hyperplasia in the correct clinical setting.    IMPRESSION:  Incomplete exam. Repeat exam recommended.              TATIANA CHESTER MD; Attending Radiologist  This document has been electronically signed. May 18 2021  2:12PM    < end of copied text >

## 2021-05-20 LAB
ALDOST SERPL-MCNC: 19.9 NG/DL — SIGNIFICANT CHANGE UP
ANION GAP SERPL CALC-SCNC: 14 MMOL/L — SIGNIFICANT CHANGE UP (ref 7–14)
BUN SERPL-MCNC: 27 MG/DL — HIGH (ref 7–23)
CALCIUM SERPL-MCNC: 10.1 MG/DL — SIGNIFICANT CHANGE UP (ref 8.4–10.5)
CHLORIDE SERPL-SCNC: 101 MMOL/L — SIGNIFICANT CHANGE UP (ref 98–107)
CO2 SERPL-SCNC: 22 MMOL/L — SIGNIFICANT CHANGE UP (ref 22–31)
CREAT SERPL-MCNC: 1.19 MG/DL — SIGNIFICANT CHANGE UP (ref 0.5–1.3)
GLUCOSE SERPL-MCNC: 122 MG/DL — HIGH (ref 70–99)
HCT VFR BLD CALC: 49.1 % — HIGH (ref 34.5–45)
HGB BLD-MCNC: 15.7 G/DL — HIGH (ref 11.5–15.5)
MAGNESIUM SERPL-MCNC: 2 MG/DL — SIGNIFICANT CHANGE UP (ref 1.6–2.6)
MCHC RBC-ENTMCNC: 27.8 PG — SIGNIFICANT CHANGE UP (ref 27–34)
MCHC RBC-ENTMCNC: 32 GM/DL — SIGNIFICANT CHANGE UP (ref 32–36)
MCV RBC AUTO: 87.1 FL — SIGNIFICANT CHANGE UP (ref 80–100)
NRBC # BLD: 0 /100 WBCS — SIGNIFICANT CHANGE UP
NRBC # FLD: 0 K/UL — SIGNIFICANT CHANGE UP
PHOSPHATE SERPL-MCNC: 3.4 MG/DL — SIGNIFICANT CHANGE UP (ref 2.5–4.5)
PLATELET # BLD AUTO: 278 K/UL — SIGNIFICANT CHANGE UP (ref 150–400)
POTASSIUM SERPL-MCNC: 3.6 MMOL/L — SIGNIFICANT CHANGE UP (ref 3.5–5.3)
POTASSIUM SERPL-SCNC: 3.6 MMOL/L — SIGNIFICANT CHANGE UP (ref 3.5–5.3)
RBC # BLD: 5.64 M/UL — HIGH (ref 3.8–5.2)
RBC # FLD: 15.5 % — HIGH (ref 10.3–14.5)
SODIUM SERPL-SCNC: 137 MMOL/L — SIGNIFICANT CHANGE UP (ref 135–145)
WBC # BLD: 5.02 K/UL — SIGNIFICANT CHANGE UP (ref 3.8–10.5)
WBC # FLD AUTO: 5.02 K/UL — SIGNIFICANT CHANGE UP (ref 3.8–10.5)

## 2021-05-20 PROCEDURE — 72132 CT LUMBAR SPINE W/DYE: CPT | Mod: 26

## 2021-05-20 PROCEDURE — 99233 SBSQ HOSP IP/OBS HIGH 50: CPT

## 2021-05-20 RX ORDER — MORPHINE SULFATE 50 MG/1
2 CAPSULE, EXTENDED RELEASE ORAL ONCE
Refills: 0 | Status: DISCONTINUED | OUTPATIENT
Start: 2021-05-20 | End: 2021-05-21

## 2021-05-20 RX ORDER — HYDRALAZINE HCL 50 MG
2.5 TABLET ORAL ONCE
Refills: 0 | Status: COMPLETED | OUTPATIENT
Start: 2021-05-20 | End: 2021-05-20

## 2021-05-20 RX ADMIN — Medication 60 MILLIGRAM(S): at 06:00

## 2021-05-20 RX ADMIN — ENOXAPARIN SODIUM 40 MILLIGRAM(S): 100 INJECTION SUBCUTANEOUS at 06:00

## 2021-05-20 RX ADMIN — POLYETHYLENE GLYCOL 3350 17 GRAM(S): 17 POWDER, FOR SOLUTION ORAL at 12:27

## 2021-05-20 RX ADMIN — Medication 50 MILLIGRAM(S): at 17:51

## 2021-05-20 RX ADMIN — SODIUM CHLORIDE 3 MILLILITER(S): 9 INJECTION INTRAMUSCULAR; INTRAVENOUS; SUBCUTANEOUS at 06:02

## 2021-05-20 RX ADMIN — Medication 2.5 MILLIGRAM(S): at 22:38

## 2021-05-20 RX ADMIN — SODIUM CHLORIDE 3 MILLILITER(S): 9 INJECTION INTRAMUSCULAR; INTRAVENOUS; SUBCUTANEOUS at 21:11

## 2021-05-20 RX ADMIN — SODIUM CHLORIDE 3 MILLILITER(S): 9 INJECTION INTRAMUSCULAR; INTRAVENOUS; SUBCUTANEOUS at 13:16

## 2021-05-20 RX ADMIN — ATORVASTATIN CALCIUM 40 MILLIGRAM(S): 80 TABLET, FILM COATED ORAL at 22:21

## 2021-05-20 RX ADMIN — Medication 81 MILLIGRAM(S): at 12:27

## 2021-05-20 RX ADMIN — ENOXAPARIN SODIUM 40 MILLIGRAM(S): 100 INJECTION SUBCUTANEOUS at 17:51

## 2021-05-20 NOTE — PROGRESS NOTE ADULT - PROBLEM SELECTOR PLAN 5
- DVT PPx - IMPROVE score - 3, At Risk -  Lovenox 40mg Q12hrs for BMI >35  - Anticipate dc to acute rehab, awaiting MRI Head, C-Spine

## 2021-05-20 NOTE — PROGRESS NOTE ADULT - SUBJECTIVE AND OBJECTIVE BOX
[facility] Division of Hospital Medicine  Daniel Mancilla DO  Pager (VIPUL-RAYMOND, 6G-0O): [phone]    Patient is a 60y old  Female who presents with a chief complaint of Pt had fallen after sudden weakness in left leg walking up steps. (20 May 2021 10:12)    SUBJECTIVE / OVERNIGHT EVENTS: Pt feels OK - overwhelmed because she's still unable to move her LLE.  Remains afebrile, no CP/SOB.  Denies change in vision/headache.      MEDICATIONS  (STANDING):  aspirin enteric coated 81 milliGRAM(s) Oral daily  atorvastatin 40 milliGRAM(s) Oral at bedtime  dextrose 40% Gel 15 Gram(s) Oral once  dextrose 5%. 1000 milliLiter(s) (50 mL/Hr) IV Continuous <Continuous>  dextrose 5%. 1000 milliLiter(s) (100 mL/Hr) IV Continuous <Continuous>  dextrose 50% Injectable 25 Gram(s) IV Push once  dextrose 50% Injectable 12.5 Gram(s) IV Push once  dextrose 50% Injectable 25 Gram(s) IV Push once  enoxaparin Injectable 40 milliGRAM(s) SubCutaneous every 12 hours  glucagon  Injectable 1 milliGRAM(s) IntraMuscular once  hydrochlorothiazide 50 milliGRAM(s) Oral <User Schedule>  insulin lispro (ADMELOG) corrective regimen sliding scale   SubCutaneous three times a day before meals  insulin lispro (ADMELOG) corrective regimen sliding scale   SubCutaneous at bedtime  NIFEdipine XL 60 milliGRAM(s) Oral <User Schedule>  polyethylene glycol 3350 17 Gram(s) Oral daily  sodium chloride 0.9% lock flush 3 milliLiter(s) IV Push every 8 hours    MEDICATIONS  (PRN):  LORazepam     Tablet 1 milliGRAM(s) Oral once PRN on call to MRI      CAPILLARY BLOOD GLUCOSE      POCT Blood Glucose.: 114 mg/dL (20 May 2021 13:03)  POCT Blood Glucose.: 122 mg/dL (20 May 2021 08:49)  POCT Blood Glucose.: 181 mg/dL (19 May 2021 21:54)  POCT Blood Glucose.: 116 mg/dL (19 May 2021 17:12)    I&O's Summary      PHYSICAL EXAM:  Vital Signs Last 24 Hrs  T(C): 36.7 (20 May 2021 08:13), Max: 36.7 (20 May 2021 05:58)  T(F): 98 (20 May 2021 08:13), Max: 98.1 (20 May 2021 05:58)  HR: 70 (20 May 2021 08:13) (70 - 87)  BP: 152/81 (20 May 2021 08:13) (152/81 - 192/83)  BP(mean): --  RR: 16 (20 May 2021 08:13) (16 - 18)  SpO2: 98% (20 May 2021 08:13) (97% - 99%)    CONSTITUTIONAL: NAD, well-developed, morbidly obese  EYES: PERRLA; conjunctiva and sclera clear  RESPIRATORY: Normal respiratory effort; lungs are clear to auscultation bilaterally  CARDIOVASCULAR: Distant heart sounds 2/2 body habitus, No lower extremity pitting edema; Peripheral pulses are 2+ bilaterally  ABDOMEN: Nontender to palpation, normoactive bowel sounds, no rebound/guarding  MUSCULOSKELETAL:  No clubbing or cyanosis of digits; no joint swelling   PSYCH: A+O to person, place, and time; affect appropriate  NEUROLOGY: CN 2-12 are intact and symmetric; no gross sensory deficits; LLE 0/5 motor, LUE 3/5 motor, R side intact   SKIN: No rashes; no palpable lesions    LABS:                        15.7   5.02  )-----------( 278      ( 20 May 2021 07:13 )             49.1     05-20    137  |  101  |  27<H>  ----------------------------<  122<H>  3.6   |  22  |  1.19    Ca    10.1      20 May 2021 07:13  Phos  3.4     05-20  Mg     2.0     05-20    Aldosterone, Serum (05.20.21 @ 09:58)    Aldosterone, Serum: 19.9: Values flagged as "Out of Range" require correlation with information  below to determine if clinically abnormal.  The expected values for Aldosterone are ng/dL  Patients Posture                                  Age            Range (ng/dL)  Upright                                            21-65                  <3.0-39.2  Supine                                               21-65                  <3.0-23.2  Upright - EDTA                                 21-65            <3.0-35.3  Supine - EDTA                                  21-65             <3.0-23.6 ng/dL      RADIOLOGY & ADDITIONAL TESTS:  Results Reviewed:   < from: CT Lumbar Spine w/ IV Cont (05.20.21 @ 11:44) >  Disc space narrowing endplate sclerotic change and osteophytes are seen at multiple levels. This is most prominent at the L5-S1 level and secondary to chronic degenerative changes.    Vacuum disc changes seen involving the L3-4 level which is secondary to chronic degenerative change.    T11-12: Normal    T12-L1: Normal    L1-2: Normal    L2-3: Disc bulge and bilateral hypertrophic facet changes are seen. Moderate to severe narrowing of the spinal canal is seen.    L3-4: Disc bulge and bilateral hypertrophic facet joint changes are seen. Moderate to severe narrowing spinal canal is seen. Mild to moderate narrowing of the left neural foramen    L4-5: Disc bulge and bilateral hypertrophic facet changes. Moderate narrowing of the spinal canal. Severe narrowing of the left neural foramen.    L5-S1: Disc bulge and bilateral hypertrophic facet joint changes. Moderate to severe narrowing of the left neural foramen.    Evaluation of the paraspinal soft tissues is limited by lack of IV contrast though grossly normal    Calcified fibroid is seen involving the pelvic region.    There is evidence of an IUD seen in the uterus.    IMPRESSION: Degenerative changes as described above.    MRI can be done for further evaluation if there are no contraindications.    < end of copied text >

## 2021-05-20 NOTE — PROGRESS NOTE ADULT - SUBJECTIVE AND OBJECTIVE BOX
Neurology Progress Note    S: Patient seen and examined. No new events overnight. patient denied CP, SOB, HA or pain. PMR rec AR      Medication:  MEDICATIONS  (STANDING):  aspirin enteric coated 81 milliGRAM(s) Oral daily  atorvastatin 40 milliGRAM(s) Oral at bedtime  dextrose 40% Gel 15 Gram(s) Oral once  dextrose 5%. 1000 milliLiter(s) (50 mL/Hr) IV Continuous <Continuous>  dextrose 5%. 1000 milliLiter(s) (100 mL/Hr) IV Continuous <Continuous>  dextrose 50% Injectable 25 Gram(s) IV Push once  dextrose 50% Injectable 12.5 Gram(s) IV Push once  dextrose 50% Injectable 25 Gram(s) IV Push once  enoxaparin Injectable 40 milliGRAM(s) SubCutaneous every 12 hours  glucagon  Injectable 1 milliGRAM(s) IntraMuscular once  hydrochlorothiazide 50 milliGRAM(s) Oral <User Schedule>  insulin lispro (ADMELOG) corrective regimen sliding scale   SubCutaneous three times a day before meals  insulin lispro (ADMELOG) corrective regimen sliding scale   SubCutaneous at bedtime  NIFEdipine XL 60 milliGRAM(s) Oral <User Schedule>  polyethylene glycol 3350 17 Gram(s) Oral daily  sodium chloride 0.9% lock flush 3 milliLiter(s) IV Push every 8 hours    MEDICATIONS  (PRN):  LORazepam     Tablet 1 milliGRAM(s) Oral once PRN on call to MRI        Vitals:  Vital Signs Last 24 Hrs  T(C): 36.7 (20 May 2021 08:13), Max: 36.8 (19 May 2021 16:00)  T(F): 98 (20 May 2021 08:13), Max: 98.2 (19 May 2021 16:00)  HR: 70 (20 May 2021 08:13) (66 - 87)  BP: 152/81 (20 May 2021 08:13) (152/81 - 208/90)  BP(mean): --  RR: 16 (20 May 2021 08:13) (16 - 18)  SpO2: 98% (20 May 2021 08:13) (97% - 100%)    General:  Constitutional: Obese Female, appears stated age, in no apparent distress including pain  Head: Normocephalic & atraumatic.    Neurological (>12):  MS: Awake, alert, oriented to person, place, situation, time. Normal affect. Follows all commands.    Language: Speech is clear, fluent with good repetition & comprehension.    CNs: PERRLA (R = 3mm, L = 3mm). VF intact in all 4 quadrants, EOMI no nystagmus. Some reduced sensation to pinprick on the L side of the face, intact to LT throughout, well developed masseter muscles b/l. No facial asymmetry b/l, full eye closure strength b/l. Symmetric palate elevation in midline. Shoulder shrug intact b/l. Tongue midline, normal movements, no atrophy.    Motor: Normal muscle bulk & tone. No noticeable tremor or seizure. Noted L arm drift.                 Deltoid	Biceps	Triceps	   R	5	5	5	5  L	4+	4+	4+	4+	    	H-Flex	H-Ext	H-ABd	H-ADd	K-Flex	K-Ext	D-Flex	P-Flex  R	5	5	5	5	5	5	5	5 	   L	4-	4-	4	4	4+	4-	5	4+	     Sensation: Reduced sensation to pinprick in the distal L leg and somewhat on the lateral thigh. Reduced sensation to pinprick on the distal L arm. Intact to vibratory sense and proprioception throughout.    Reflexes:              Biceps(C5)       BR(C6)     Triceps(C7)               Patellar(L4)    Achilles(S1)    Plantar Resp  R	2	          2	             2		        0		    1		Mute   L	2	          2	             2		        0		    1		Mute     Coordination: No dysmetria to FTN    Gait: Deferred    I personally reviewed the below data/images/labs:    CBC Full  -  ( 20 May 2021 07:13 )  WBC Count : 5.02 K/uL  RBC Count : 5.64 M/uL  Hemoglobin : 15.7 g/dL  Hematocrit : 49.1 %  Platelet Count - Automated : 278 K/uL  Mean Cell Volume : 87.1 fL  Mean Cell Hemoglobin : 27.8 pg  Mean Cell Hemoglobin Concentration : 32.0 gm/dL  Auto Neutrophil # : x  Auto Lymphocyte # : x  Auto Monocyte # : x  Auto Eosinophil # : x  Auto Basophil # : x  Auto Neutrophil % : x  Auto Lymphocyte % : x  Auto Monocyte % : x  Auto Eosinophil % : x  Auto Basophil % : x    05-20    137  |  101  |  27<H>  ----------------------------<  122<H>  3.6   |  22  |  1.19    Ca    10.1      20 May 2021 07:13  Phos  3.4     05-20  Mg     2.0     05-20           PTT - ( 16 May 2021 18:56 )  PTT:37.4 sec  Urinalysis Basic - ( 17 May 2021 06:51 )    Color: Light Yellow / Appearance: Clear / S.037 / pH: x  Gluc: x / Ketone: Small  / Bili: Negative / Urobili: <2 mg/dL   Blood: x / Protein: Trace / Nitrite: Negative   Leuk Esterase: Negative / RBC: 0 /HPF / WBC 1 /HPF   Sq Epi: x / Non Sq Epi: Occasional / Bacteria: Few      < from: CT Hip No Cont, Left (21 @ 23:06) >    EXAM:  CT HIP ONLY LT        PROCEDURE DATE:  May 16 2021         INTERPRETATION:  HISTORY: Left-sided hip pain.    Helical CT imaging of the left hip was performed without intravenous contrast. Sagittal and coronal reformats were provided. 3-D reformats were performed on a separate workstation.    Correlation is made to prior radiographs from a 2021.    FINDINGS:    There is no evidence of acute fracture or dislocation. There is severe left hip arthrosis with prominent subchondral cystic change along the posterior margin of the articulation. There is mild bilateral sacral iliac joint arthrosis.    There is no subcutaneous or intramuscular hematoma. There is a calcified uterine fibroid. There is colonic diverticulosis.    IMPRESSION:    Severe left hip arthrosis.    No evidence of acute fracture or dislocation.              ANNE VIDALES MD; Attending Radiologist  This document has been electronically signed. May 17 2021  8:50AM    < end of copied text >  < from: CT Angio Head w/ IV Cont (21 @ 23:06) >    EXAM:  CT ANGIO BRAIN (W)AW IC      EXAM:  CT ANGIO NECK (W)AW IC        PROCEDURE DATE:  May 16 2021         INTERPRETATION:  HISTORY: Left lower extremity weakness and slurred speech, for 2-3 days    COMPARISON: No similar prior    TECHNIQUE: Noncontrast CT head and CT angiogram of the neck and brain was performed after administration of intravenous contrast. MIP and 3D reconstructions were performed.    Total of 90 cc Omnipaque 350 intravenous contrast were administered without complication.10 cc discarded.    FINDINGS:    CT HEAD:    No acute intracranial hemorrhage, or CT evidence of acute, large territorial infarct. There is an age-indeterminate lacunar infarct in the left thalamus.. No hydrocephalus. Basilar cisterns are clear. Scalp and imaged midfacial soft tissues are unremarkable. Calvarium is intact.    CTA BRAIN  This study is degraded by venous contamination.    INTERNAL CAROTID ARTERIES:  The intracranial segments of the ICA are patent without hemodynamically significantstenosis, occlusion, or aneurysm. The ICA bifurcations are unremarkable.    ANTERIOR CEREBRAL ARTERIES: No proximal flow-limiting stenosis or occlusion. Anterior communicating artery is unremarkable without aneurysm.    MIDDLE CEREBRAL ARTERIES: No proximal flow-limiting stenosis or occlusion. MCA bifurcations are unremarkable without aneurysm.    POSTERIOR CEREBRAL ARTERIES: No proximal flow-limiting stenosis or occlusion. Posterior communicating artery is well-developed on the right.    VERTEBROBASILAR SYSTEM: The right vertebral artery predominantly terminates in the PICA. The basilar flow is predominantly supplied by the left vertebral artery. The distal left V4 segment demonstrates short segment of attenuated flow, estimated at 50%. The basilar tip is unremarkable    CTA NECK  This study is degraded by combination of streak artifact from the patient's body habitus and shoulder positioning as well and motion.    RIGHT CAROTID SYSTEM: Normal in course and caliber without flow-limiting stenosis or occlusion.    LEFT CAROTID SYSTEM: Normal in course and caliber without flow-limiting stenosis or occlusion.    VERTEBRAL SYSTEM: Very limited evaluation of the origin and majority of the cervical portions of the bilateral vertebral arteries due to the above limitations. The arteries are grossly patent.    AORTIC ARCH: Typical three-vessel arch..    IMPRESSION:    CT HEAD: Age-indeterminate lacunar infarct in the left thalamus. No acute intracranial hemorrhage.    CTA BRAIN: Degraded exam. No evidence of proximal vessel occlusion or flow-limiting stenosis.    CTA NECK: Degraded exam, with essentially nondiagnostic evaluation of the vertebral arteries. No occlusion or flow-limiting stenosis in the carotid arteries.            SINDI ESQUEDA MD, Resident Radiology  This document has been electronically signed.  ASUNCION GARRIDO MD; Attending Radiologist  This document has been electronically signed. May 16 2021 11:51PM    < end of copied text >  < from: MR Head No Cont (05.18.21 @ 13:31) >    EXAM:  MR BRAIN        PROCEDURE DATE:  May 18 2021         INTERPRETATION:  CLINICAL STATEMENT: Left-sided weakness    TECHNIQUE: MRI of the brain was performed without gadolinium.    COMPARISON: CT head 2021    FINDINGS:  Incomplete exam dueto technical failure. Only the sagittal and axial T1 sequences obtained.    No midline shift. No gross mass effect. No hydrocephalus. Probable chronic lacunar infarct left thalamus    Nonspecific diffuse low T1 bone marrow signal noted which could berelated to red marrow hyperplasia in the correct clinical setting.    IMPRESSION:  Incomplete exam. Repeat exam recommended.              TATIANA CHESTER MD; Attending Radiologist  This document has been electronically signed. May 18 2021  2:12PM    < end of copied text >

## 2021-05-21 LAB
ANION GAP SERPL CALC-SCNC: 14 MMOL/L — SIGNIFICANT CHANGE UP (ref 7–14)
BASOPHILS # BLD AUTO: 0.06 K/UL — SIGNIFICANT CHANGE UP (ref 0–0.2)
BASOPHILS NFR BLD AUTO: 1.1 % — SIGNIFICANT CHANGE UP (ref 0–2)
BUN SERPL-MCNC: 32 MG/DL — HIGH (ref 7–23)
CALCIUM SERPL-MCNC: 10.3 MG/DL — SIGNIFICANT CHANGE UP (ref 8.4–10.5)
CHLORIDE SERPL-SCNC: 100 MMOL/L — SIGNIFICANT CHANGE UP (ref 98–107)
CO2 SERPL-SCNC: 22 MMOL/L — SIGNIFICANT CHANGE UP (ref 22–31)
CREAT SERPL-MCNC: 1.26 MG/DL — SIGNIFICANT CHANGE UP (ref 0.5–1.3)
EOSINOPHIL # BLD AUTO: 0.61 K/UL — HIGH (ref 0–0.5)
EOSINOPHIL NFR BLD AUTO: 10.8 % — HIGH (ref 0–6)
GLUCOSE BLDC GLUCOMTR-MCNC: 103 MG/DL — HIGH (ref 70–99)
GLUCOSE BLDC GLUCOMTR-MCNC: 135 MG/DL — HIGH (ref 70–99)
GLUCOSE BLDC GLUCOMTR-MCNC: 170 MG/DL — HIGH (ref 70–99)
GLUCOSE SERPL-MCNC: 120 MG/DL — HIGH (ref 70–99)
HCT VFR BLD CALC: 47.9 % — HIGH (ref 34.5–45)
HGB BLD-MCNC: 15.1 G/DL — SIGNIFICANT CHANGE UP (ref 11.5–15.5)
IANC: 2.93 K/UL — SIGNIFICANT CHANGE UP (ref 1.5–8.5)
IMM GRANULOCYTES NFR BLD AUTO: 0.2 % — SIGNIFICANT CHANGE UP (ref 0–1.5)
LYMPHOCYTES # BLD AUTO: 1.53 K/UL — SIGNIFICANT CHANGE UP (ref 1–3.3)
LYMPHOCYTES # BLD AUTO: 27 % — SIGNIFICANT CHANGE UP (ref 13–44)
MCHC RBC-ENTMCNC: 28 PG — SIGNIFICANT CHANGE UP (ref 27–34)
MCHC RBC-ENTMCNC: 31.5 GM/DL — LOW (ref 32–36)
MCV RBC AUTO: 88.9 FL — SIGNIFICANT CHANGE UP (ref 80–100)
MONOCYTES # BLD AUTO: 0.52 K/UL — SIGNIFICANT CHANGE UP (ref 0–0.9)
MONOCYTES NFR BLD AUTO: 9.2 % — SIGNIFICANT CHANGE UP (ref 2–14)
NEUTROPHILS # BLD AUTO: 2.93 K/UL — SIGNIFICANT CHANGE UP (ref 1.8–7.4)
NEUTROPHILS NFR BLD AUTO: 51.7 % — SIGNIFICANT CHANGE UP (ref 43–77)
NRBC # BLD: 0 /100 WBCS — SIGNIFICANT CHANGE UP
NRBC # FLD: 0 K/UL — SIGNIFICANT CHANGE UP
PLATELET # BLD AUTO: 286 K/UL — SIGNIFICANT CHANGE UP (ref 150–400)
POTASSIUM SERPL-MCNC: 3.3 MMOL/L — LOW (ref 3.5–5.3)
POTASSIUM SERPL-SCNC: 3.3 MMOL/L — LOW (ref 3.5–5.3)
RBC # BLD: 5.39 M/UL — HIGH (ref 3.8–5.2)
RBC # FLD: 15.3 % — HIGH (ref 10.3–14.5)
RENIN DIRECT, PLASMA: 7 PG/ML — SIGNIFICANT CHANGE UP
SODIUM SERPL-SCNC: 136 MMOL/L — SIGNIFICANT CHANGE UP (ref 135–145)
WBC # BLD: 5.66 K/UL — SIGNIFICANT CHANGE UP (ref 3.8–10.5)
WBC # FLD AUTO: 5.66 K/UL — SIGNIFICANT CHANGE UP (ref 3.8–10.5)

## 2021-05-21 PROCEDURE — 99233 SBSQ HOSP IP/OBS HIGH 50: CPT

## 2021-05-21 PROCEDURE — 70551 MRI BRAIN STEM W/O DYE: CPT | Mod: 26

## 2021-05-21 PROCEDURE — 99232 SBSQ HOSP IP/OBS MODERATE 35: CPT

## 2021-05-21 RX ORDER — NIFEDIPINE 30 MG
30 TABLET, EXTENDED RELEASE 24 HR ORAL ONCE
Refills: 0 | Status: COMPLETED | OUTPATIENT
Start: 2021-05-21 | End: 2021-05-21

## 2021-05-21 RX ORDER — NYSTATIN CREAM 100000 [USP'U]/G
1 CREAM TOPICAL
Refills: 0 | Status: DISCONTINUED | OUTPATIENT
Start: 2021-05-21 | End: 2021-06-01

## 2021-05-21 RX ORDER — NIFEDIPINE 30 MG
90 TABLET, EXTENDED RELEASE 24 HR ORAL DAILY
Refills: 0 | Status: DISCONTINUED | OUTPATIENT
Start: 2021-05-22 | End: 2021-06-01

## 2021-05-21 RX ORDER — POTASSIUM CHLORIDE 20 MEQ
40 PACKET (EA) ORAL ONCE
Refills: 0 | Status: COMPLETED | OUTPATIENT
Start: 2021-05-21 | End: 2021-05-21

## 2021-05-21 RX ORDER — ACETAMINOPHEN 500 MG
650 TABLET ORAL EVERY 6 HOURS
Refills: 0 | Status: DISCONTINUED | OUTPATIENT
Start: 2021-05-21 | End: 2021-06-01

## 2021-05-21 RX ADMIN — POLYETHYLENE GLYCOL 3350 17 GRAM(S): 17 POWDER, FOR SOLUTION ORAL at 13:43

## 2021-05-21 RX ADMIN — Medication 30 MILLIGRAM(S): at 13:42

## 2021-05-21 RX ADMIN — ATORVASTATIN CALCIUM 40 MILLIGRAM(S): 80 TABLET, FILM COATED ORAL at 21:20

## 2021-05-21 RX ADMIN — Medication 0.1 MILLIGRAM(S): at 17:47

## 2021-05-21 RX ADMIN — Medication 650 MILLIGRAM(S): at 02:10

## 2021-05-21 RX ADMIN — MORPHINE SULFATE 2 MILLIGRAM(S): 50 CAPSULE, EXTENDED RELEASE ORAL at 19:16

## 2021-05-21 RX ADMIN — Medication 40 MILLIEQUIVALENT(S): at 13:42

## 2021-05-21 RX ADMIN — NYSTATIN CREAM 1 APPLICATION(S): 100000 CREAM TOPICAL at 17:29

## 2021-05-21 RX ADMIN — SODIUM CHLORIDE 3 MILLILITER(S): 9 INJECTION INTRAMUSCULAR; INTRAVENOUS; SUBCUTANEOUS at 13:37

## 2021-05-21 RX ADMIN — Medication 60 MILLIGRAM(S): at 06:20

## 2021-05-21 RX ADMIN — ENOXAPARIN SODIUM 40 MILLIGRAM(S): 100 INJECTION SUBCUTANEOUS at 17:29

## 2021-05-21 RX ADMIN — SODIUM CHLORIDE 3 MILLILITER(S): 9 INJECTION INTRAMUSCULAR; INTRAVENOUS; SUBCUTANEOUS at 06:14

## 2021-05-21 RX ADMIN — Medication 1 MILLIGRAM(S): at 20:25

## 2021-05-21 RX ADMIN — Medication 50 MILLIGRAM(S): at 17:29

## 2021-05-21 RX ADMIN — ENOXAPARIN SODIUM 40 MILLIGRAM(S): 100 INJECTION SUBCUTANEOUS at 06:20

## 2021-05-21 RX ADMIN — Medication 650 MILLIGRAM(S): at 01:30

## 2021-05-21 RX ADMIN — Medication 81 MILLIGRAM(S): at 13:42

## 2021-05-21 RX ADMIN — SODIUM CHLORIDE 3 MILLILITER(S): 9 INJECTION INTRAMUSCULAR; INTRAVENOUS; SUBCUTANEOUS at 21:15

## 2021-05-21 NOTE — CONSULT NOTE ADULT - SUBJECTIVE AND OBJECTIVE BOX
NEUROSURGERY CONSULT    HPI:   Pt is a  61 yo female with PMHx of Hypertension and DM, Osteoarthritis, obesity and B/L Carpel Tunnel.  She denies CAD.  She was seen, treated and discharged from the ER at [facility] yesterday for c/o a few months of leg weakness and  left knee pain  (5/16/21) and upon walking up steps at home, on the 4th step her left leg went totally weak and she sat on the step.  She denies a true fall or hurting her hip, head or any other body part.  She denies associated dizziness, back, hip, knee or ankle pain prior to the left leg weakness. She came back to the ER for repeat evaluation.    CTH & CT angio H&N showed: CT HEAD: Age-indeterminate lacunar infarct in the left thalamus. No acute intracranial hemorrhage.  CTA BRAIN: Degraded exam. No evidence of proximal vessel occlusion or flow-limiting stenosis.  CTA NECK: Degraded exam, with essentially nondiagnostic evaluation of the vertebral arteries. No occlusion or flow-limiting stenosis in the carotid arteries.  CT Left HIP: PRELIM - No acute fracture. Follow up final report.  X-ray KNEE: There is no fracture or dislocation. No knee effusion.  The joint spaces are preserved. No abnormal soft tissue swelling or mineralization.    Admission for further workup is recommended.    CT L/s done showing extensive DJD and neurosurgery called. Patient attempted to get mri which was aborted due to claustrophobia. Per patient has been having left sided weakness since Saturday unable to move her left leg. Patient denies any bowel or bladder dysfunction        RADIOLOGY: T11-12: Normal    T12-L1: Normal    L1-2: Normal    L2-3: Disc bulge and bilateral hypertrophic facet changes are seen. Moderate to severe narrowing of the spinal canal is seen.    L3-4: Disc bulge and bilateral hypertrophic facet joint changes are seen. Moderate to severe narrowing spinal canal is seen. Mild to moderate narrowing of the left neural foramen    L4-5: Disc bulge and bilateral hypertrophic facet changes. Moderate narrowing of the spinal canal. Severe narrowing of the left neural foramen.    L5-S1: Disc bulge and bilateral hypertrophic facet joint changes. Moderate to severe narrowing of the left neural foramen.    Evaluation of the paraspinal soft tissues is limited by lack of IV contrast though grossly normal    Calcified fibroid is seen involving the pelvic region.    There is evidence of an IUD seen in the uterus.    IMPRESSION: Degenerative changes as described above.    MRI can be done for further evaluation if there are no contraindications.      MEDS:  acetaminophen   Tablet .. 650 milliGRAM(s) Oral every 6 hours PRN  aspirin enteric coated 81 milliGRAM(s) Oral daily  atorvastatin 40 milliGRAM(s) Oral at bedtime  dextrose 40% Gel 15 Gram(s) Oral once  dextrose 5%. 1000 milliLiter(s) IV Continuous <Continuous>  dextrose 5%. 1000 milliLiter(s) IV Continuous <Continuous>  dextrose 50% Injectable 25 Gram(s) IV Push once  dextrose 50% Injectable 12.5 Gram(s) IV Push once  dextrose 50% Injectable 25 Gram(s) IV Push once  enoxaparin Injectable 40 milliGRAM(s) SubCutaneous every 12 hours  glucagon  Injectable 1 milliGRAM(s) IntraMuscular once  hydrochlorothiazide 50 milliGRAM(s) Oral <User Schedule>  insulin lispro (ADMELOG) corrective regimen sliding scale   SubCutaneous three times a day before meals  insulin lispro (ADMELOG) corrective regimen sliding scale   SubCutaneous at bedtime  LORazepam     Tablet 1 milliGRAM(s) Oral once PRN  morphine  - Injectable 2 milliGRAM(s) IV Push once  nystatin Powder 1 Application(s) Topical two times a day  polyethylene glycol 3350 17 Gram(s) Oral daily  sodium chloride 0.9% lock flush 3 milliLiter(s) IV Push every 8 hours      PHYSICAL EXAM:  awake, alert, fc  perrl, tracts  R. UE 5/5, L. UE bicep/tricep 3/5 HG 4/5  R. LE 4/5, L. LE 0/5    Vital Signs Last 24 Hrs  T(C): 36.1 (21 May 2021 13:25), Max: 36.7 (20 May 2021 17:48)  T(F): 97 (21 May 2021 13:25), Max: 98.1 (20 May 2021 22:16)  HR: 70 (21 May 2021 13:25) (63 - 83)  BP: 199/78 (21 May 2021 13:25) (158/87 - 199/78)  BP(mean): --  RR: 16 (21 May 2021 13:25) (16 - 17)  SpO2: 99% (21 May 2021 13:25) (96% - 100%)    LABS:                        15.1   5.66  )-----------( 286      ( 21 May 2021 04:30 )             47.9     05-21    136  |  100  |  32<H>  ----------------------------<  120<H>  3.3<L>   |  22  |  1.26    Ca    10.3      21 May 2021 04:30  Phos  3.4     05-20  Mg     2.0     05-20

## 2021-05-21 NOTE — PROGRESS NOTE ADULT - SUBJECTIVE AND OBJECTIVE BOX
[facility] Division of Hospital Medicine  Stacie Machado MD  Pager [phone]    Patient is a 60y old  Female who presents with a chief complaint of Pt had fallen after sudden weakness in left leg walking up steps.       SUBJECTIVE / OVERNIGHT EVENTS: feeling ok; denies LE pain; still reports unable to move LLE      MEDICATIONS  (STANDING):  aspirin enteric coated 81 milliGRAM(s) Oral daily  atorvastatin 40 milliGRAM(s) Oral at bedtime  dextrose 40% Gel 15 Gram(s) Oral once  dextrose 5%. 1000 milliLiter(s) (50 mL/Hr) IV Continuous <Continuous>  dextrose 5%. 1000 milliLiter(s) (100 mL/Hr) IV Continuous <Continuous>  dextrose 50% Injectable 25 Gram(s) IV Push once  dextrose 50% Injectable 12.5 Gram(s) IV Push once  dextrose 50% Injectable 25 Gram(s) IV Push once  enoxaparin Injectable 40 milliGRAM(s) SubCutaneous every 12 hours  glucagon  Injectable 1 milliGRAM(s) IntraMuscular once  hydrochlorothiazide 50 milliGRAM(s) Oral <User Schedule>  insulin lispro (ADMELOG) corrective regimen sliding scale   SubCutaneous three times a day before meals  insulin lispro (ADMELOG) corrective regimen sliding scale   SubCutaneous at bedtime  morphine  - Injectable 2 milliGRAM(s) IV Push once  nystatin Powder 1 Application(s) Topical two times a day  polyethylene glycol 3350 17 Gram(s) Oral daily  sodium chloride 0.9% lock flush 3 milliLiter(s) IV Push every 8 hours    MEDICATIONS  (PRN):  acetaminophen   Tablet .. 650 milliGRAM(s) Oral every 6 hours PRN Mild Pain (1 - 3), Moderate Pain (4 - 6)  LORazepam     Tablet 1 milliGRAM(s) Oral once PRN on call to MRI      CAPILLARY BLOOD GLUCOSE      POCT Blood Glucose.: 135 mg/dL (21 May 2021 13:24)  POCT Blood Glucose.: 110 mg/dL (21 May 2021 08:54)  POCT Blood Glucose.: 135 mg/dL (20 May 2021 20:52)  POCT Blood Glucose.: 97 mg/dL (20 May 2021 17:55)          PHYSICAL EXAM:  Vital Signs Last 24 Hrs  T(F): 97 (21 May 2021 13:25), Max: 98.1 (20 May 2021 22:16)  HR: 70 (21 May 2021 13:25) (63 - 83)  BP: 199/78 (21 May 2021 13:25) (158/87 - 199/78)  RR: 16 (21 May 2021 13:25) (16 - 17)  SpO2: 99% (21 May 2021 13:25) (96% - 100%)    CONSTITUTIONAL: NAD, obese  RESPIRATORY: Normal respiratory effort; lungs are clear to auscultation ant/lat  CARDIOVASCULAR: Regular rate and rhythm; No lower extremity edema;   ABDOMEN: Nontender to palpation, normoactive bowel sounds, no rebound/guarding;   MUSCULOSKELETAL:  no clubbing or cyanosis of digits;   PSYCH: A+O to person, place, and time; affect appropriate  NEUROLOGY: LLE weakness  LABS:                        15.1   5.66  )-----------( 286      ( 21 May 2021 04:30 )             47.9     05-21    136  |  100  |  32<H>  ----------------------------<  120<H>  3.3<L>   |  22  |  1.26    Ca    10.3      21 May 2021 04:30  Phos  3.4     05-20  Mg     2.0     05-20

## 2021-05-21 NOTE — CONSULT NOTE ADULT - REASON FOR ADMISSION
Pt had fallen after sudden weakness in left leg walking up steps.
Pt had fallen after sudden weakness in left leg walking up steps.

## 2021-05-21 NOTE — PROGRESS NOTE ADULT - PROBLEM SELECTOR PLAN 5
- DVT PPx - IMPROVE score - 3, At Risk -  Lovenox 40mg Q12hrs for BMI >35  - Anticipate dc to acute rehab, awaiting MRI Head, C/T/L spine

## 2021-05-21 NOTE — PROVIDER CONTACT NOTE (OTHER) - ACTION/TREATMENT ORDERED:
Contacted and notified Selina Diaz; awaiting further recommendations at this time; relaying/endorsing information to AM nurse during change of shift report

## 2021-05-21 NOTE — PROGRESS NOTE ADULT - SUBJECTIVE AND OBJECTIVE BOX
Patient is a 60y old  Female who presents with a chief complaint of Pt had fallen after sudden weakness in left leg walking up steps. (21 May 2021 14:22)      HPI:  Pt is a  61 yo female with PMHx of Hypertension and DM, Osteoarthritis and B/L Carpel Tunnel.  She denies CAD.  She was seen, treated and discharged from the ER at [facility] yesterday for c/o a few months of leg weakness and  left knee pain  (21) and upon walking up steps at home, on the 4th step her left leg went totally weak and she sat on the step.  She denies a true fall or hurting her hip, head or any other body part.  She denies associated dizziness, back, hip, knee or ankle pain prior to the left leg weakness. She came back to the ER for repeat evaluation.    CTH & CT angio H&N showed: CT HEAD: Age-indeterminate lacunar infarct in the left thalamus. No acute intracranial hemorrhage.  CTA BRAIN: Degraded exam. No evidence of proximal vessel occlusion or flow-limiting stenosis.  CTA NECK: Degraded exam, with essentially nondiagnostic evaluation of the vertebral arteries. No occlusion or flow-limiting stenosis in the carotid arteries.  CT Left HIP: PRELIM - No acute fracture. Follow up final report.  X-ray KNEE: There is no fracture or dislocation. No knee effusion.  The joint spaces are preserved. No abnormal soft tissue swelling or mineralization.    Admission for further workup is recommended.        (17 May 2021 02:42)    planning for MRI reattempt today  Patient reports weakness unchanged.    REVIEW OF SYSTEMS  Constitutional - No fever, No weight loss, No fatigue  HEENT - No eye pain, No visual disturbances, No difficulty hearing, No tinnitus, No vertigo, No neck pain  Respiratory - No cough, No wheezing, No shortness of breath  Cardiovascular - No chest pain, No palpitations  Gastrointestinal - No abdominal pain, No nausea, No vomiting, No diarrhea, No constipation  Genitourinary - No dysuria, No frequency, No hematuria, No incontinence  Neurological - No headaches, No memory loss, + loss of strength, + numbness, No tremors  Skin - No itching, No rashes, No lesions   Endocrine - No temperature intolerance  Musculoskeletal - No joint pain, No joint swelling, No muscle pain  Psychiatric - No depression, No anxiety    PAST MEDICAL & SURGICAL HISTORY  Diabetes mellitus    Hypertension    Morbid obesity    Morbid obesity with BMI of 60.0-69.9, adult    Type 2 diabetes mellitus    Endometrial hyperplasia    History of  section        SOCIAL HISTORY  Smoking - Denied  EtOH - Denied   Drugs - Denied    FUNCTIONAL HISTORY  Lives with children in 2 story house with 5 stairs to enter, 1 flight inside but stays on main level  Independent at baseline for ambulation and adls  retired, worked for the [employer]      CURRENT FUNCTIONAL STATUS   Bed Mobility  Bed Mobility Training Rehab Potential: fair, will monitor progress closely  Bed Mobility Training Symptoms Noted During/After Treatment: none  Bed Mobility Training Sit-to-Supine: moderate assist (50% patient effort);  2 person assist;  verbal cues;  set-up required;  bed rails  Bed Mobility Training Supine-to-Sit: moderate assist (50% patient effort);  2 person assist;  verbal cues;  set-up required;  bed rails;  long sitting in bed only due to weakness x 10 reps   Bed Mobility Training Limitations: decreased ability to use legs for bridging/pushing;  decreased ability to use arms for pushing/pulling;  impaired ability to control trunk for mobility;  decreased strength;  impaired balance    Therapeutic Exercise  Therapeutic Exercise Rehab Effort: good  Therapeutic Exercise Symptoms Noted During/After Treatment: none  Therapeutic Exercise Detail: Patient performed active ROM ex's to RUE/RLE , active assisted ROM to LUE and passive ROM to LLE and seated balancing activities in long sitting.         FAMILY HISTORY   Family history of stroke (Mother)        RECENT LABS/IMAGING  < from: CT Lumbar Spine w/ IV Cont (21 @ 11:44) >    EXAM:  CT LUMBAR SPINE IC        PROCEDURE DATE:  May 20 2021         INTERPRETATION:  Clinical indication: Lower extremity weakness    Multiple axial sections were performed through the lumbar spine. Coronal and sagittal reconstructions were performed as well.    This exam is somewhat limited by motion.    Scoliosis is seen.    Loss of the normal lumbar lordosis is seen.    Schmorl's nodes are seen multiple levels    Disc space narrowing endplate sclerotic change and osteophytes are seen at multiple levels. This is most prominent at the L5-S1 level and secondary to chronic degenerative changes.    Vacuum disc changes seen involving the L3-4 level which is secondary to chronic degenerative change.    T11-12: Normal    T12-L1: Normal    L1-2: Normal    L2-3: Disc bulge and bilateral hypertrophic facet changes are seen. Moderate to severe narrowing of the spinal canal is seen.    L3-4: Disc bulge and bilateral hypertrophic facet joint changes are seen. Moderate to severe narrowing spinal canal is seen. Mild to moderate narrowing of the left neural foramen    L4-5: Disc bulge and bilateral hypertrophic facet changes. Moderate narrowing of the spinal canal. Severe narrowing of the left neural foramen.    L5-S1: Disc bulge and bilateral hypertrophic facet joint changes. Moderate to severe narrowing of the left neural foramen.    Evaluation of the paraspinal soft tissues is limited by lack of IV contrast though grossly normal    Calcified fibroid is seen involving the pelvic region.    There is evidence of an IUD seen in the uterus.    IMPRESSION: Degenerative changes as described above.    MRI can be done for further evaluation if there are no contraindications.              HEVER GOODRICH M.D., ATTENDING RADIOLOGIST    < end of copied text >    CBC Full  -  ( 21 May 2021 04:30 )  WBC Count : 5.66 K/uL  RBC Count : 5.39 M/uL  Hemoglobin : 15.1 g/dL  Hematocrit : 47.9 %  Platelet Count - Automated : 286 K/uL  Mean Cell Volume : 88.9 fL  Mean Cell Hemoglobin : 28.0 pg  Mean Cell Hemoglobin Concentration : 31.5 gm/dL  Auto Neutrophil # : 2.93 K/uL  Auto Lymphocyte # : 1.53 K/uL  Auto Monocyte # : 0.52 K/uL  Auto Eosinophil # : 0.61 K/uL  Auto Basophil # : 0.06 K/uL  Auto Neutrophil % : 51.7 %  Auto Lymphocyte % : 27.0 %  Auto Monocyte % : 9.2 %  Auto Eosinophil % : 10.8 %  Auto Basophil % : 1.1 %        136  |  100  |  32<H>  ----------------------------<  120<H>  3.3<L>   |  22  |  1.26    Ca    10.3      21 May 2021 04:30  Phos  3.4     -  Mg     2.0     -20          VITALS  T(C): 36.1 (21 @ 13:25), Max: 36.7 (21 @ 17:48)  HR: 70 (21 @ 13:25) (63 - 83)  BP: 199/78 (21 @ 13:25) (158/87 - 199/78)  RR: 16 (21 @ 13:25) (16 - 17)  SpO2: 99% (21 @ 13:25) (96% - 100%)  Wt(kg): --    ALLERGIES  No Known Allergies      MEDICATIONS   acetaminophen   Tablet .. 650 milliGRAM(s) Oral every 6 hours PRN  aspirin enteric coated 81 milliGRAM(s) Oral daily  atorvastatin 40 milliGRAM(s) Oral at bedtime  dextrose 40% Gel 15 Gram(s) Oral once  dextrose 5%. 1000 milliLiter(s) IV Continuous <Continuous>  dextrose 5%. 1000 milliLiter(s) IV Continuous <Continuous>  dextrose 50% Injectable 25 Gram(s) IV Push once  dextrose 50% Injectable 12.5 Gram(s) IV Push once  dextrose 50% Injectable 25 Gram(s) IV Push once  enoxaparin Injectable 40 milliGRAM(s) SubCutaneous every 12 hours  glucagon  Injectable 1 milliGRAM(s) IntraMuscular once  hydrochlorothiazide 50 milliGRAM(s) Oral <User Schedule>  insulin lispro (ADMELOG) corrective regimen sliding scale   SubCutaneous three times a day before meals  insulin lispro (ADMELOG) corrective regimen sliding scale   SubCutaneous at bedtime  LORazepam     Tablet 1 milliGRAM(s) Oral once PRN  morphine  - Injectable 2 milliGRAM(s) IV Push once  nystatin Powder 1 Application(s) Topical two times a day  polyethylene glycol 3350 17 Gram(s) Oral daily  sodium chloride 0.9% lock flush 3 milliLiter(s) IV Push every 8 hours      ----------------------------------------------------------------------------------------  PHYSICAL EXAM  Constitutional - NAD, Comfortable  HEENT - NCAT, EOMI  Neck - Supple, No limited ROM  Chest -no respiratory distress  Cardiovascular - RRR, S1S2   Abdomen -  Soft, NTND  Extremities - No C/C/E, No calf tenderness   Neurologic Exam -                    Cognitive - Awake, Alert, AAO to self, place, date, year, situation     Communication - Fluent, No dysarthria     Cranial Nerves - CN 2-12 intact     Motor -                      LEFT    UE - ShAB 0/5, EF 3/5, EE 2/5, WE 3/5,  3/5                    RIGHT UE - ShAB 5/5, EF 5/5, EE 5/5, WE 5/5,  5/5                    LEFT    LE - HF 0/5, KE 0/5, DF 0/5, PF 0/5                    RIGHT LE - HF 3/5, KE 3/5, DF 3/5, PF 3/5        Sensory - Intact to LT except for LLE decreased to lt      OculoVestibular - No saccades, No nystagmus, VOR         Balance - WNL Static  Psychiatric - Mood stable, Affect WNL  ----------------------------------------------------------------------------------------  ASSESSMENT/PLAN   61 yo RHD female with PMHx of Hypertension and DM, Osteoarthritis presented with left LE and left UE weakness, possible cva.  chronic lacunar infarct left thalamus detected on incomplete mri exam.   CT revealed moderate to severe spinal cord narrowing, neurosurgery following, MRI follow up today  MRI attempted but incomplete, follow up today   possible cva: on asa, statin  hypertensive to 188/82 overnight  constipation: miralax, increase bowel regimen as needed  Pain -denies  DVT PPX - lovenox  diet: regular consistent carb dash/tlc  slp- patient reports difficulty pronouncing words at times  htn: hctz, procardia  Rehab -    pending progress with bedside therapy and mri results, recommend acute inpatient rehab when medically cleared. Patient can tolerate 3 hours per day of therapy with medical supervision.

## 2021-05-21 NOTE — PROVIDER CONTACT NOTE (OTHER) - ACTION/TREATMENT ORDERED:
Contacted and notified donald Cruz pa; administering Nifedipine PO 60mg and will recheck BP Contacted and notified donald Cruz; Hydralazine 2.5 mg IVP and continue to monitor

## 2021-05-21 NOTE — PROVIDER CONTACT NOTE (OTHER) - RECOMMENDATIONS
Contacted and notified donald Cruz pa; administering Nifedipine PO 60mg and will recheck BP Contacted and notified donald Cruz; awaiting further recommendations at this time

## 2021-05-21 NOTE — PROGRESS NOTE ADULT - SUBJECTIVE AND OBJECTIVE BOX
Neurology Progress Note    S: Patient seen and examined. No new events overnight. patient denied CP, SOB, HA or pain. PMR rec AR.  c/o pain L foot/ cramp.  CT L spine obtained with DJD      Medication:  MEDICATIONS  (STANDING):  aspirin enteric coated 81 milliGRAM(s) Oral daily  atorvastatin 40 milliGRAM(s) Oral at bedtime  dextrose 40% Gel 15 Gram(s) Oral once  dextrose 5%. 1000 milliLiter(s) (50 mL/Hr) IV Continuous <Continuous>  dextrose 5%. 1000 milliLiter(s) (100 mL/Hr) IV Continuous <Continuous>  dextrose 50% Injectable 25 Gram(s) IV Push once  dextrose 50% Injectable 12.5 Gram(s) IV Push once  dextrose 50% Injectable 25 Gram(s) IV Push once  enoxaparin Injectable 40 milliGRAM(s) SubCutaneous every 12 hours  glucagon  Injectable 1 milliGRAM(s) IntraMuscular once  hydrochlorothiazide 50 milliGRAM(s) Oral <User Schedule>  insulin lispro (ADMELOG) corrective regimen sliding scale   SubCutaneous three times a day before meals  insulin lispro (ADMELOG) corrective regimen sliding scale   SubCutaneous at bedtime  morphine  - Injectable 2 milliGRAM(s) IV Push once  NIFEdipine XL 60 milliGRAM(s) Oral <User Schedule>  polyethylene glycol 3350 17 Gram(s) Oral daily  sodium chloride 0.9% lock flush 3 milliLiter(s) IV Push every 8 hours    MEDICATIONS  (PRN):  acetaminophen   Tablet .. 650 milliGRAM(s) Oral every 6 hours PRN Mild Pain (1 - 3), Moderate Pain (4 - 6)  LORazepam     Tablet 1 milliGRAM(s) Oral once PRN on call to MRI      Vitals:  Vital Signs Last 24 Hrs  T(C): 36.4 (21 May 2021 06:14), Max: 36.7 (20 May 2021 17:48)  T(F): 97.5 (21 May 2021 06:14), Max: 98.1 (20 May 2021 22:16)  HR: 70 (21 May 2021 06:51) (63 - 83)  BP: 196/74 (21 May 2021 06:51) (155/78 - 196/74)  BP(mean): --  RR: 17 (21 May 2021 06:14) (16 - 17)  SpO2: 100% (21 May 2021 06:14) (96% - 100%)      General:  Constitutional: Obese Female, appears stated age, in no apparent distress including pain  Head: Normocephalic & atraumatic.    Neurological (>12):  MS: Awake, alert, oriented to person, place, situation, time. Normal affect. Follows all commands.    Language: Speech is clear, fluent with good repetition & comprehension.    CNs: PERRLA (R = 3mm, L = 3mm). VF intact in all 4 quadrants, EOMI no nystagmus. Some reduced sensation to pinprick on the L side of the face, intact to LT throughout, well developed masseter muscles b/l. No facial asymmetry b/l, full eye closure strength b/l. Symmetric palate elevation in midline. Shoulder shrug intact b/l. Tongue midline, normal movements, no atrophy.    Motor: Normal muscle bulk & tone RUE. . No noticeable tremor or seizure. Noted L arm drift- improved. LLE 2-3/5. RLE 3-4/5    Sensation: Reduced sensation to pinprick in the distal L leg and somewhat on the lateral thigh. Reduced sensation to pinprick on the distal L arm. Intact to vibratory sense and proprioception throughout.    Reflexes:              Biceps(C5)       BR(C6)     Triceps(C7)               Patellar(L4)    Achilles(S1)    Plantar Resp  R	2	          2	             2		        0		    1		Mute   L	2	          2	             2		        0		    1		Mute     Coordination: No dysmetria to FTN    Gait: Deferred    I personally reviewed the below data/images/labs:    CBC Full  -  ( 21 May 2021 04:30 )  WBC Count : 5.66 K/uL  RBC Count : 5.39 M/uL  Hemoglobin : 15.1 g/dL  Hematocrit : 47.9 %  Platelet Count - Automated : 286 K/uL  Mean Cell Volume : 88.9 fL  Mean Cell Hemoglobin : 28.0 pg  Mean Cell Hemoglobin Concentration : 31.5 gm/dL  Auto Neutrophil # : 2.93 K/uL  Auto Lymphocyte # : 1.53 K/uL  Auto Monocyte # : 0.52 K/uL  Auto Eosinophil # : 0.61 K/uL  Auto Basophil # : 0.06 K/uL  Auto Neutrophil % : 51.7 %  Auto Lymphocyte % : 27.0 %  Auto Monocyte % : 9.2 %  Auto Eosinophil % : 10.8 %  Auto Basophil % : 1.1 %    05-    136  |  100  |  32<H>  ----------------------------<  120<H>  3.3<L>   |  22  |  1.26    Ca    10.3      21 May 2021 04:30  Phos  3.4     05-20  Mg     2.0     05-20         PTT - ( 16 May 2021 18:56 )  PTT:37.4 sec  Urinalysis Basic - ( 17 May 2021 06:51 )    Color: Light Yellow / Appearance: Clear / S.037 / pH: x  Gluc: x / Ketone: Small  / Bili: Negative / Urobili: <2 mg/dL   Blood: x / Protein: Trace / Nitrite: Negative   Leuk Esterase: Negative / RBC: 0 /HPF / WBC 1 /HPF   Sq Epi: x / Non Sq Epi: Occasional / Bacteria: Few      < from: CT Hip No Cont, Left (21 @ 23:06) >    EXAM:  CT HIP ONLY LT        PROCEDURE DATE:  May 16 2021         INTERPRETATION:  HISTORY: Left-sided hip pain.    Helical CT imaging of the left hip was performed without intravenous contrast. Sagittal and coronal reformats were provided. 3-D reformats were performed on a separate workstation.    Correlation is made to prior radiographs from a 2021.    FINDINGS:    There is no evidence of acute fracture or dislocation. There is severe left hip arthrosis with prominent subchondral cystic change along the posterior margin of the articulation. There is mild bilateral sacral iliac joint arthrosis.    There is no subcutaneous or intramuscular hematoma. There is a calcified uterine fibroid. There is colonic diverticulosis.    IMPRESSION:    Severe left hip arthrosis.    No evidence of acute fracture or dislocation.              ANNE VIDALES MD; Attending Radiologist  This document has been electronically signed. May 17 2021  8:50AM    < end of copied text >  < from: CT Angio Head w/ IV Cont (21 @ 23:06) >    EXAM:  CT ANGIO BRAIN (W)AW IC      EXAM:  CT ANGIO NECK (W)AW IC        PROCEDURE DATE:  May 16 2021         INTERPRETATION:  HISTORY: Left lower extremity weakness and slurred speech, for 2-3 days    COMPARISON: No similar prior    TECHNIQUE: Noncontrast CT head and CT angiogram of the neck and brain was performed after administration of intravenous contrast. MIP and 3D reconstructions were performed.    Total of 90 cc Omnipaque 350 intravenous contrast were administered without complication.10 cc discarded.    FINDINGS:    CT HEAD:    No acute intracranial hemorrhage, or CT evidence of acute, large territorial infarct. There is an age-indeterminate lacunar infarct in the left thalamus.. No hydrocephalus. Basilar cisterns are clear. Scalp and imaged midfacial soft tissues are unremarkable. Calvarium is intact.    CTA BRAIN  This study is degraded by venous contamination.    INTERNAL CAROTID ARTERIES:  The intracranial segments of the ICA are patent without hemodynamically significantstenosis, occlusion, or aneurysm. The ICA bifurcations are unremarkable.    ANTERIOR CEREBRAL ARTERIES: No proximal flow-limiting stenosis or occlusion. Anterior communicating artery is unremarkable without aneurysm.    MIDDLE CEREBRAL ARTERIES: No proximal flow-limiting stenosis or occlusion. MCA bifurcations are unremarkable without aneurysm.    POSTERIOR CEREBRAL ARTERIES: No proximal flow-limiting stenosis or occlusion. Posterior communicating artery is well-developed on the right.    VERTEBROBASILAR SYSTEM: The right vertebral artery predominantly terminates in the PICA. The basilar flow is predominantly supplied by the left vertebral artery. The distal left V4 segment demonstrates short segment of attenuated flow, estimated at 50%. The basilar tip is unremarkable    CTA NECK  This study is degraded by combination of streak artifact from the patient's body habitus and shoulder positioning as well and motion.    RIGHT CAROTID SYSTEM: Normal in course and caliber without flow-limiting stenosis or occlusion.    LEFT CAROTID SYSTEM: Normal in course and caliber without flow-limiting stenosis or occlusion.    VERTEBRAL SYSTEM: Very limited evaluation of the origin and majority of the cervical portions of the bilateral vertebral arteries due to the above limitations. The arteries are grossly patent.    AORTIC ARCH: Typical three-vessel arch..    IMPRESSION:    CT HEAD: Age-indeterminate lacunar infarct in the left thalamus. No acute intracranial hemorrhage.    CTA BRAIN: Degraded exam. No evidence of proximal vessel occlusion or flow-limiting stenosis.    CTA NECK: Degraded exam, with essentially nondiagnostic evaluation of the vertebral arteries. No occlusion or flow-limiting stenosis in the carotid arteries.            SINDI ESQUEDA MD, Resident Radiology  This document has been electronically signed.  ASUNCION GARRIDO MD; Attending Radiologist  This document has been electronically signed. May 16 2021 11:51PM    < end of copied text >  < from: MR Head No Cont (21 @ 13:31) >    EXAM:  MR BRAIN        PROCEDURE DATE:  May 18 2021         INTERPRETATION:  CLINICAL STATEMENT: Left-sided weakness    TECHNIQUE: MRI of the brain was performed without gadolinium.    COMPARISON: CT head 2021    FINDINGS:  Incomplete exam dueto technical failure. Only the sagittal and axial T1 sequences obtained.    No midline shift. No gross mass effect. No hydrocephalus. Probable chronic lacunar infarct left thalamus    Nonspecific diffuse low T1 bone marrow signal noted which could berelated to red marrow hyperplasia in the correct clinical setting.    IMPRESSION:  Incomplete exam. Repeat exam recommended.        TATIANA CHESTER MD; Attending Radiologist  This document has been electronically signed. May 18 2021  2:12PM    < end of copied text >  < from: CT Lumbar Spine w/ IV Cont (21 @ 11:44) >    EXAM:  CT LUMBAR SPINE IC        PROCEDURE DATE:  May 20 2021         INTERPRETATION:  Clinical indication: Lower extremity weakness    Multiple axial sections were performed through the lumbar spine. Coronal and sagittal reconstructions were performed as well.    This exam is somewhat limited by motion.    Scoliosis is seen.    Loss of the normal lumbar lordosis is seen.    Schmorl's nodes are seen multiple levels    Disc space narrowing endplate sclerotic change and osteophytes are seen at multiple levels. This is most prominent at the L5-S1 level and secondary to chronic degenerative changes.    Vacuum disc changes seen involving the L3-4 level which is secondary to chronic degenerative change.    T11-12: Normal    T12-L1: Normal    L1-2: Normal    L2-3: Disc bulge and bilateral hypertrophic facet changes are seen. Moderate to severe narrowing of the spinal canal is seen.    L3-4: Disc bulge and bilateral hypertrophic facet joint changes are seen. Moderate to severe narrowing spinal canal is seen. Mild to moderate narrowing of the left neural foramen    L4-5: Disc bulge and bilateral hypertrophic facet changes. Moderate narrowing of the spinal canal. Severe narrowing of the left neural foramen.    L5-S1: Disc bulge and bilateral hypertrophic facet joint changes. Moderate to severe narrowing of the left neural foramen.    Evaluation of the paraspinal soft tissues is limited by lack of IV contrast though grossly normal    Calcified fibroid is seen involving the pelvic region.    There is evidence of an IUD seen in the uterus.    IMPRESSION: Degenerative changes as described above.    MRI can be done for further evaluation if there are no contraindications.              HEVER GOODRICH M.D., ATTENDING RADIOLOGIST  This document has been electronically signed. May 20 2021 11:58AM    < end of copied text >

## 2021-05-22 LAB
ANION GAP SERPL CALC-SCNC: 12 MMOL/L — SIGNIFICANT CHANGE UP (ref 7–14)
BASOPHILS # BLD AUTO: 0.05 K/UL — SIGNIFICANT CHANGE UP (ref 0–0.2)
BASOPHILS NFR BLD AUTO: 0.8 % — SIGNIFICANT CHANGE UP (ref 0–2)
BUN SERPL-MCNC: 40 MG/DL — HIGH (ref 7–23)
CALCIUM SERPL-MCNC: 10 MG/DL — SIGNIFICANT CHANGE UP (ref 8.4–10.5)
CHLORIDE SERPL-SCNC: 103 MMOL/L — SIGNIFICANT CHANGE UP (ref 98–107)
CO2 SERPL-SCNC: 22 MMOL/L — SIGNIFICANT CHANGE UP (ref 22–31)
CREAT SERPL-MCNC: 1.21 MG/DL — SIGNIFICANT CHANGE UP (ref 0.5–1.3)
EOSINOPHIL # BLD AUTO: 0.48 K/UL — SIGNIFICANT CHANGE UP (ref 0–0.5)
EOSINOPHIL NFR BLD AUTO: 8 % — HIGH (ref 0–6)
GLUCOSE BLDC GLUCOMTR-MCNC: 100 MG/DL — HIGH (ref 70–99)
GLUCOSE BLDC GLUCOMTR-MCNC: 123 MG/DL — HIGH (ref 70–99)
GLUCOSE BLDC GLUCOMTR-MCNC: 147 MG/DL — HIGH (ref 70–99)
GLUCOSE BLDC GLUCOMTR-MCNC: 99 MG/DL — SIGNIFICANT CHANGE UP (ref 70–99)
GLUCOSE SERPL-MCNC: 124 MG/DL — HIGH (ref 70–99)
HCT VFR BLD CALC: 47 % — HIGH (ref 34.5–45)
HGB BLD-MCNC: 15 G/DL — SIGNIFICANT CHANGE UP (ref 11.5–15.5)
IANC: 3.66 K/UL — SIGNIFICANT CHANGE UP (ref 1.5–8.5)
IMM GRANULOCYTES NFR BLD AUTO: 0.3 % — SIGNIFICANT CHANGE UP (ref 0–1.5)
LYMPHOCYTES # BLD AUTO: 1.38 K/UL — SIGNIFICANT CHANGE UP (ref 1–3.3)
LYMPHOCYTES # BLD AUTO: 22.9 % — SIGNIFICANT CHANGE UP (ref 13–44)
MCHC RBC-ENTMCNC: 27.7 PG — SIGNIFICANT CHANGE UP (ref 27–34)
MCHC RBC-ENTMCNC: 31.9 GM/DL — LOW (ref 32–36)
MCV RBC AUTO: 86.7 FL — SIGNIFICANT CHANGE UP (ref 80–100)
MONOCYTES # BLD AUTO: 0.44 K/UL — SIGNIFICANT CHANGE UP (ref 0–0.9)
MONOCYTES NFR BLD AUTO: 7.3 % — SIGNIFICANT CHANGE UP (ref 2–14)
NEUTROPHILS # BLD AUTO: 3.66 K/UL — SIGNIFICANT CHANGE UP (ref 1.8–7.4)
NEUTROPHILS NFR BLD AUTO: 60.7 % — SIGNIFICANT CHANGE UP (ref 43–77)
NRBC # BLD: 0 /100 WBCS — SIGNIFICANT CHANGE UP
NRBC # FLD: 0 K/UL — SIGNIFICANT CHANGE UP
PLATELET # BLD AUTO: 283 K/UL — SIGNIFICANT CHANGE UP (ref 150–400)
POTASSIUM SERPL-MCNC: 4.5 MMOL/L — SIGNIFICANT CHANGE UP (ref 3.5–5.3)
POTASSIUM SERPL-SCNC: 4.5 MMOL/L — SIGNIFICANT CHANGE UP (ref 3.5–5.3)
RBC # BLD: 5.42 M/UL — HIGH (ref 3.8–5.2)
RBC # FLD: 15.4 % — HIGH (ref 10.3–14.5)
SODIUM SERPL-SCNC: 137 MMOL/L — SIGNIFICANT CHANGE UP (ref 135–145)
WBC # BLD: 6.03 K/UL — SIGNIFICANT CHANGE UP (ref 3.8–10.5)
WBC # FLD AUTO: 6.03 K/UL — SIGNIFICANT CHANGE UP (ref 3.8–10.5)

## 2021-05-22 PROCEDURE — 99232 SBSQ HOSP IP/OBS MODERATE 35: CPT

## 2021-05-22 PROCEDURE — 93306 TTE W/DOPPLER COMPLETE: CPT | Mod: 26

## 2021-05-22 RX ORDER — LABETALOL HCL 100 MG
5 TABLET ORAL ONCE
Refills: 0 | Status: DISCONTINUED | OUTPATIENT
Start: 2021-05-22 | End: 2021-05-22

## 2021-05-22 RX ORDER — HYDRALAZINE HCL 50 MG
10 TABLET ORAL ONCE
Refills: 0 | Status: COMPLETED | OUTPATIENT
Start: 2021-05-22 | End: 2021-05-22

## 2021-05-22 RX ORDER — FLUTICASONE PROPIONATE 50 MCG
1 SPRAY, SUSPENSION NASAL
Refills: 0 | Status: DISCONTINUED | OUTPATIENT
Start: 2021-05-22 | End: 2021-06-01

## 2021-05-22 RX ADMIN — Medication 650 MILLIGRAM(S): at 22:53

## 2021-05-22 RX ADMIN — ENOXAPARIN SODIUM 40 MILLIGRAM(S): 100 INJECTION SUBCUTANEOUS at 05:17

## 2021-05-22 RX ADMIN — Medication 81 MILLIGRAM(S): at 18:07

## 2021-05-22 RX ADMIN — NYSTATIN CREAM 1 APPLICATION(S): 100000 CREAM TOPICAL at 05:17

## 2021-05-22 RX ADMIN — SODIUM CHLORIDE 3 MILLILITER(S): 9 INJECTION INTRAMUSCULAR; INTRAVENOUS; SUBCUTANEOUS at 05:16

## 2021-05-22 RX ADMIN — Medication 650 MILLIGRAM(S): at 21:54

## 2021-05-22 RX ADMIN — SODIUM CHLORIDE 3 MILLILITER(S): 9 INJECTION INTRAMUSCULAR; INTRAVENOUS; SUBCUTANEOUS at 21:53

## 2021-05-22 RX ADMIN — SODIUM CHLORIDE 3 MILLILITER(S): 9 INJECTION INTRAMUSCULAR; INTRAVENOUS; SUBCUTANEOUS at 12:48

## 2021-05-22 RX ADMIN — ATORVASTATIN CALCIUM 40 MILLIGRAM(S): 80 TABLET, FILM COATED ORAL at 21:53

## 2021-05-22 RX ADMIN — ENOXAPARIN SODIUM 40 MILLIGRAM(S): 100 INJECTION SUBCUTANEOUS at 18:08

## 2021-05-22 RX ADMIN — Medication 0.1 MILLIGRAM(S): at 21:53

## 2021-05-22 RX ADMIN — Medication 10 MILLIGRAM(S): at 18:24

## 2021-05-22 RX ADMIN — Medication 50 MILLIGRAM(S): at 18:09

## 2021-05-22 RX ADMIN — Medication 1 SPRAY(S): at 18:09

## 2021-05-22 RX ADMIN — NYSTATIN CREAM 1 APPLICATION(S): 100000 CREAM TOPICAL at 18:09

## 2021-05-22 RX ADMIN — Medication 90 MILLIGRAM(S): at 03:15

## 2021-05-22 NOTE — PROGRESS NOTE ADULT - PROBLEM SELECTOR PLAN 5
- DVT PPx - IMPROVE score - 3, At Risk -  Lovenox 40mg Q12hrs for BMI >35  - Anticipate dc to acute rehab, unable to sundar MRI, may need anesthesia vs CT myelogram

## 2021-05-22 NOTE — PROVIDER CONTACT NOTE (OTHER) - ASSESSMENT
Alert and oriented x4; asymptomatic at this time; for full vital signs please view vital signs flow sheet

## 2021-05-22 NOTE — PROVIDER CONTACT NOTE (OTHER) - ACTION/TREATMENT ORDERED:
Contacted and notified Sushant MATT; Awaiting any further recommendations and will continue to monitor As per Sushant MATT, administer AM dose of Nifedipine now; recheck BP later after administration

## 2021-05-22 NOTE — PROVIDER CONTACT NOTE (OTHER) - RECOMMENDATIONS
Contacted and notified Sushant MATT; Awaiting any further recommendations and will continue to monitor

## 2021-05-22 NOTE — PROGRESS NOTE ADULT - SUBJECTIVE AND OBJECTIVE BOX
[facility] Division of Hospital Medicine  Stacie Machado MD  Pager [phone]    Patient is a 60y old  Female who presents with a chief complaint of Pt had fallen after sudden weakness in left leg walking up steps.       SUBJECTIVE / OVERNIGHT EVENTS: denies pain in back or leg; still reports inability to move LLE; LUE now improved: pt was unable to sundar MRI even with medication; she reports difficulty lying on her back that she feels like "everything is coming up"  I  could not elicit if she meant a regurgitation type feeling or if its the sensation of her pannus moving up      MEDICATIONS  (STANDING):  aspirin enteric coated 81 milliGRAM(s) Oral daily  atorvastatin 40 milliGRAM(s) Oral at bedtime  dextrose 40% Gel 15 Gram(s) Oral once  dextrose 5%. 1000 milliLiter(s) (50 mL/Hr) IV Continuous <Continuous>  dextrose 5%. 1000 milliLiter(s) (100 mL/Hr) IV Continuous <Continuous>  dextrose 50% Injectable 25 Gram(s) IV Push once  dextrose 50% Injectable 12.5 Gram(s) IV Push once  dextrose 50% Injectable 25 Gram(s) IV Push once  enoxaparin Injectable 40 milliGRAM(s) SubCutaneous every 12 hours  fluticasone propionate 50 MICROgram(s)/spray Nasal Spray 1 Spray(s) Both Nostrils two times a day  glucagon  Injectable 1 milliGRAM(s) IntraMuscular once  hydrochlorothiazide 50 milliGRAM(s) Oral <User Schedule>  insulin lispro (ADMELOG) corrective regimen sliding scale   SubCutaneous three times a day before meals  insulin lispro (ADMELOG) corrective regimen sliding scale   SubCutaneous at bedtime  NIFEdipine XL 90 milliGRAM(s) Oral daily  nystatin Powder 1 Application(s) Topical two times a day  polyethylene glycol 3350 17 Gram(s) Oral daily  sodium chloride 0.9% lock flush 3 milliLiter(s) IV Push every 8 hours    MEDICATIONS  (PRN):  acetaminophen   Tablet .. 650 milliGRAM(s) Oral every 6 hours PRN Mild Pain (1 - 3), Moderate Pain (4 - 6)      CAPILLARY BLOOD GLUCOSE  POCT Blood Glucose.: 147 mg/dL (22 May 2021 12:37)  POCT Blood Glucose.: 99 mg/dL (22 May 2021 09:59)  POCT Blood Glucose.: 170 mg/dL (21 May 2021 22:07)  POCT Blood Glucose.: 103 mg/dL (21 May 2021 17:52)        PHYSICAL EXAM:  Vital Signs Last 24 Hrs  T(F): 97.8 (22 May 2021 02:52), Max: 97.8 (22 May 2021 02:52)  HR: 63 (22 May 2021 04:08) (63 - 75)  BP: 157/82 (22 May 2021 04:08) (134/72 - 183/82)  RR: 16 (22 May 2021 02:52) (16 - 17)  SpO2: 96% (22 May 2021 02:52) (96% - 98%)    CONSTITUTIONAL: NAD, morbidly obese  RESPIRATORY: Normal respiratory effort; lungs are clear to auscultation ant/lat b/l  CARDIOVASCULAR: Regular rate and rhythm; No lower extremity edema;   ABDOMEN: Nontender to palpation, normoactive bowel sounds, obese  MUSCULOSKELETAL:  no joint swelling or tenderness to palpation  PSYCH: affect appropriate  NEUROLOGY: unable to move LLE or wiggle toes; LUE now can lift over head, good       LABS:                        15.0   6.03  )-----------( 283      ( 22 May 2021 05:12 )             47.0     05-22    137  |  103  |  40<H>  ----------------------------<  124<H>  4.5   |  22  |  1.21    Ca    10.0      22 May 2021 05:12

## 2021-05-23 LAB
ANION GAP SERPL CALC-SCNC: 12 MMOL/L — SIGNIFICANT CHANGE UP (ref 7–14)
BASOPHILS # BLD AUTO: 0.04 K/UL — SIGNIFICANT CHANGE UP (ref 0–0.2)
BASOPHILS NFR BLD AUTO: 0.7 % — SIGNIFICANT CHANGE UP (ref 0–2)
BUN SERPL-MCNC: 34 MG/DL — HIGH (ref 7–23)
CALCIUM SERPL-MCNC: 10.5 MG/DL — SIGNIFICANT CHANGE UP (ref 8.4–10.5)
CHLORIDE SERPL-SCNC: 100 MMOL/L — SIGNIFICANT CHANGE UP (ref 98–107)
CO2 SERPL-SCNC: 22 MMOL/L — SIGNIFICANT CHANGE UP (ref 22–31)
CREAT SERPL-MCNC: 1.06 MG/DL — SIGNIFICANT CHANGE UP (ref 0.5–1.3)
EOSINOPHIL # BLD AUTO: 0.57 K/UL — HIGH (ref 0–0.5)
EOSINOPHIL NFR BLD AUTO: 10.4 % — HIGH (ref 0–6)
GLUCOSE BLDC GLUCOMTR-MCNC: 119 MG/DL — HIGH (ref 70–99)
GLUCOSE BLDC GLUCOMTR-MCNC: 128 MG/DL — HIGH (ref 70–99)
GLUCOSE BLDC GLUCOMTR-MCNC: 128 MG/DL — HIGH (ref 70–99)
GLUCOSE BLDC GLUCOMTR-MCNC: 133 MG/DL — HIGH (ref 70–99)
GLUCOSE SERPL-MCNC: 121 MG/DL — HIGH (ref 70–99)
HCT VFR BLD CALC: 46.6 % — HIGH (ref 34.5–45)
HGB BLD-MCNC: 14.6 G/DL — SIGNIFICANT CHANGE UP (ref 11.5–15.5)
IANC: 3.04 K/UL — SIGNIFICANT CHANGE UP (ref 1.5–8.5)
IMM GRANULOCYTES NFR BLD AUTO: 0.2 % — SIGNIFICANT CHANGE UP (ref 0–1.5)
LYMPHOCYTES # BLD AUTO: 1.34 K/UL — SIGNIFICANT CHANGE UP (ref 1–3.3)
LYMPHOCYTES # BLD AUTO: 24.4 % — SIGNIFICANT CHANGE UP (ref 13–44)
MCHC RBC-ENTMCNC: 27.4 PG — SIGNIFICANT CHANGE UP (ref 27–34)
MCHC RBC-ENTMCNC: 31.3 GM/DL — LOW (ref 32–36)
MCV RBC AUTO: 87.6 FL — SIGNIFICANT CHANGE UP (ref 80–100)
MONOCYTES # BLD AUTO: 0.49 K/UL — SIGNIFICANT CHANGE UP (ref 0–0.9)
MONOCYTES NFR BLD AUTO: 8.9 % — SIGNIFICANT CHANGE UP (ref 2–14)
NEUTROPHILS # BLD AUTO: 3.04 K/UL — SIGNIFICANT CHANGE UP (ref 1.8–7.4)
NEUTROPHILS NFR BLD AUTO: 55.4 % — SIGNIFICANT CHANGE UP (ref 43–77)
NRBC # BLD: 0 /100 WBCS — SIGNIFICANT CHANGE UP
NRBC # FLD: 0 K/UL — SIGNIFICANT CHANGE UP
PLATELET # BLD AUTO: 281 K/UL — SIGNIFICANT CHANGE UP (ref 150–400)
POTASSIUM SERPL-MCNC: 3.2 MMOL/L — LOW (ref 3.5–5.3)
POTASSIUM SERPL-SCNC: 3.2 MMOL/L — LOW (ref 3.5–5.3)
RBC # BLD: 5.32 M/UL — HIGH (ref 3.8–5.2)
RBC # FLD: 15.2 % — HIGH (ref 10.3–14.5)
SODIUM SERPL-SCNC: 134 MMOL/L — LOW (ref 135–145)
WBC # BLD: 5.49 K/UL — SIGNIFICANT CHANGE UP (ref 3.8–10.5)
WBC # FLD AUTO: 5.49 K/UL — SIGNIFICANT CHANGE UP (ref 3.8–10.5)

## 2021-05-23 PROCEDURE — 99232 SBSQ HOSP IP/OBS MODERATE 35: CPT

## 2021-05-23 RX ORDER — POTASSIUM CHLORIDE 20 MEQ
40 PACKET (EA) ORAL ONCE
Refills: 0 | Status: COMPLETED | OUTPATIENT
Start: 2021-05-23 | End: 2021-05-23

## 2021-05-23 RX ADMIN — ENOXAPARIN SODIUM 40 MILLIGRAM(S): 100 INJECTION SUBCUTANEOUS at 17:10

## 2021-05-23 RX ADMIN — Medication 1 SPRAY(S): at 17:10

## 2021-05-23 RX ADMIN — ATORVASTATIN CALCIUM 40 MILLIGRAM(S): 80 TABLET, FILM COATED ORAL at 21:19

## 2021-05-23 RX ADMIN — NYSTATIN CREAM 1 APPLICATION(S): 100000 CREAM TOPICAL at 04:27

## 2021-05-23 RX ADMIN — POLYETHYLENE GLYCOL 3350 17 GRAM(S): 17 POWDER, FOR SOLUTION ORAL at 12:04

## 2021-05-23 RX ADMIN — SODIUM CHLORIDE 3 MILLILITER(S): 9 INJECTION INTRAMUSCULAR; INTRAVENOUS; SUBCUTANEOUS at 13:24

## 2021-05-23 RX ADMIN — SODIUM CHLORIDE 3 MILLILITER(S): 9 INJECTION INTRAMUSCULAR; INTRAVENOUS; SUBCUTANEOUS at 04:28

## 2021-05-23 RX ADMIN — Medication 40 MILLIEQUIVALENT(S): at 04:28

## 2021-05-23 RX ADMIN — Medication 81 MILLIGRAM(S): at 12:04

## 2021-05-23 RX ADMIN — NYSTATIN CREAM 1 APPLICATION(S): 100000 CREAM TOPICAL at 17:10

## 2021-05-23 RX ADMIN — SODIUM CHLORIDE 3 MILLILITER(S): 9 INJECTION INTRAMUSCULAR; INTRAVENOUS; SUBCUTANEOUS at 21:19

## 2021-05-23 RX ADMIN — Medication 0.1 MILLIGRAM(S): at 21:19

## 2021-05-23 RX ADMIN — Medication 90 MILLIGRAM(S): at 04:27

## 2021-05-23 RX ADMIN — ENOXAPARIN SODIUM 40 MILLIGRAM(S): 100 INJECTION SUBCUTANEOUS at 04:27

## 2021-05-23 RX ADMIN — Medication 50 MILLIGRAM(S): at 17:10

## 2021-05-23 RX ADMIN — Medication 1 SPRAY(S): at 04:28

## 2021-05-23 NOTE — PROGRESS NOTE ADULT - SUBJECTIVE AND OBJECTIVE BOX
[facility] Division of Hospital Medicine  Stacie Machado MD  Pager [phone]    Patient is a 60y old  Female who presents with a chief complaint of Pt had fallen after sudden weakness in left leg walking up steps.      SUBJECTIVE / OVERNIGHT EVENTS: pt reports LLE stiff and still unable to move it; denies pain; no BM      MEDICATIONS  (STANDING):  aspirin enteric coated 81 milliGRAM(s) Oral daily  atorvastatin 40 milliGRAM(s) Oral at bedtime  cloNIDine 0.1 milliGRAM(s) Oral at bedtime  dextrose 40% Gel 15 Gram(s) Oral once  dextrose 5%. 1000 milliLiter(s) (50 mL/Hr) IV Continuous <Continuous>  dextrose 5%. 1000 milliLiter(s) (100 mL/Hr) IV Continuous <Continuous>  dextrose 50% Injectable 25 Gram(s) IV Push once  dextrose 50% Injectable 12.5 Gram(s) IV Push once  dextrose 50% Injectable 25 Gram(s) IV Push once  enoxaparin Injectable 40 milliGRAM(s) SubCutaneous every 12 hours  fluticasone propionate 50 MICROgram(s)/spray Nasal Spray 1 Spray(s) Both Nostrils two times a day  glucagon  Injectable 1 milliGRAM(s) IntraMuscular once  hydrochlorothiazide 50 milliGRAM(s) Oral <User Schedule>  insulin lispro (ADMELOG) corrective regimen sliding scale   SubCutaneous three times a day before meals  insulin lispro (ADMELOG) corrective regimen sliding scale   SubCutaneous at bedtime  NIFEdipine XL 90 milliGRAM(s) Oral daily  nystatin Powder 1 Application(s) Topical two times a day  polyethylene glycol 3350 17 Gram(s) Oral daily  sodium chloride 0.9% lock flush 3 milliLiter(s) IV Push every 8 hours    MEDICATIONS  (PRN):  acetaminophen   Tablet .. 650 milliGRAM(s) Oral every 6 hours PRN Mild Pain (1 - 3), Moderate Pain (4 - 6)      CAPILLARY BLOOD GLUCOSE  POCT Blood Glucose.: 128 mg/dL (23 May 2021 09:05)  POCT Blood Glucose.: 123 mg/dL (22 May 2021 21:47)  POCT Blood Glucose.: 100 mg/dL (22 May 2021 17:52)  POCT Blood Glucose.: 147 mg/dL (22 May 2021 12:37)        PHYSICAL EXAM:  Vital Signs Last 24 Hrs  T(F): 97.3 (23 May 2021 09:21), Max: 98.1 (22 May 2021 14:15)  HR: 77 (23 May 2021 10:38) (60 - 93)  BP: 168/78 (23 May 2021 10:38) (140/65 - 200/93)  RR: 18 (23 May 2021 09:21) (15 - 18)  SpO2: 98% (23 May 2021 09:21) (97% - 98%)    CONSTITUTIONAL: NAD, morbidly obese  RESPIRATORY: Normal respiratory effort; lungs are clear to auscultation bilaterally ant/lat  CARDIOVASCULAR: Regular rate and rhythm; No lower extremity edema;   ABDOMEN: Nontender to palpation, normoactive bowel sounds, soft; obese  MUSCULOSKELETAL:  no joint swelling or tenderness to palpation  PSYCH: A+O to person, place, and time; affect appropriate  NEUROLOGY: upgoing babinski on left      LABS:                        14.6   5.49  )-----------( 281      ( 23 May 2021 03:44 )             46.6     05-23    134<L>  |  100  |  34<H>  ----------------------------<  121<H>  3.2<L>   |  22  |  1.06    Ca    10.5      23 May 2021 03:29

## 2021-05-24 LAB
ANION GAP SERPL CALC-SCNC: 15 MMOL/L — HIGH (ref 7–14)
BASOPHILS # BLD AUTO: 0.05 K/UL — SIGNIFICANT CHANGE UP (ref 0–0.2)
BASOPHILS NFR BLD AUTO: 1 % — SIGNIFICANT CHANGE UP (ref 0–2)
BUN SERPL-MCNC: 37 MG/DL — HIGH (ref 7–23)
CALCIUM SERPL-MCNC: 10.3 MG/DL — SIGNIFICANT CHANGE UP (ref 8.4–10.5)
CHLORIDE SERPL-SCNC: 101 MMOL/L — SIGNIFICANT CHANGE UP (ref 98–107)
CO2 SERPL-SCNC: 20 MMOL/L — LOW (ref 22–31)
CREAT SERPL-MCNC: 1.16 MG/DL — SIGNIFICANT CHANGE UP (ref 0.5–1.3)
EOSINOPHIL # BLD AUTO: 0.54 K/UL — HIGH (ref 0–0.5)
EOSINOPHIL NFR BLD AUTO: 11.3 % — HIGH (ref 0–6)
GLUCOSE BLDC GLUCOMTR-MCNC: 107 MG/DL — HIGH (ref 70–99)
GLUCOSE BLDC GLUCOMTR-MCNC: 117 MG/DL — HIGH (ref 70–99)
GLUCOSE BLDC GLUCOMTR-MCNC: 133 MG/DL — HIGH (ref 70–99)
GLUCOSE SERPL-MCNC: 123 MG/DL — HIGH (ref 70–99)
HCT VFR BLD CALC: 47.5 % — HIGH (ref 34.5–45)
HGB BLD-MCNC: 14.7 G/DL — SIGNIFICANT CHANGE UP (ref 11.5–15.5)
IANC: 2.36 K/UL — SIGNIFICANT CHANGE UP (ref 1.5–8.5)
IMM GRANULOCYTES NFR BLD AUTO: 0.4 % — SIGNIFICANT CHANGE UP (ref 0–1.5)
LYMPHOCYTES # BLD AUTO: 1.35 K/UL — SIGNIFICANT CHANGE UP (ref 1–3.3)
LYMPHOCYTES # BLD AUTO: 28.1 % — SIGNIFICANT CHANGE UP (ref 13–44)
MCHC RBC-ENTMCNC: 27.7 PG — SIGNIFICANT CHANGE UP (ref 27–34)
MCHC RBC-ENTMCNC: 30.9 GM/DL — LOW (ref 32–36)
MCV RBC AUTO: 89.6 FL — SIGNIFICANT CHANGE UP (ref 80–100)
MONOCYTES # BLD AUTO: 0.48 K/UL — SIGNIFICANT CHANGE UP (ref 0–0.9)
MONOCYTES NFR BLD AUTO: 10 % — SIGNIFICANT CHANGE UP (ref 2–14)
NEUTROPHILS # BLD AUTO: 2.36 K/UL — SIGNIFICANT CHANGE UP (ref 1.8–7.4)
NEUTROPHILS NFR BLD AUTO: 49.2 % — SIGNIFICANT CHANGE UP (ref 43–77)
NRBC # BLD: 0 /100 WBCS — SIGNIFICANT CHANGE UP
NRBC # FLD: 0 K/UL — SIGNIFICANT CHANGE UP
PLATELET # BLD AUTO: 234 K/UL — SIGNIFICANT CHANGE UP (ref 150–400)
POTASSIUM SERPL-MCNC: 3.7 MMOL/L — SIGNIFICANT CHANGE UP (ref 3.5–5.3)
POTASSIUM SERPL-SCNC: 3.7 MMOL/L — SIGNIFICANT CHANGE UP (ref 3.5–5.3)
RBC # BLD: 5.3 M/UL — HIGH (ref 3.8–5.2)
RBC # FLD: 15.3 % — HIGH (ref 10.3–14.5)
SODIUM SERPL-SCNC: 136 MMOL/L — SIGNIFICANT CHANGE UP (ref 135–145)
WBC # BLD: 4.8 K/UL — SIGNIFICANT CHANGE UP (ref 3.8–10.5)
WBC # FLD AUTO: 4.8 K/UL — SIGNIFICANT CHANGE UP (ref 3.8–10.5)

## 2021-05-24 PROCEDURE — 99232 SBSQ HOSP IP/OBS MODERATE 35: CPT

## 2021-05-24 RX ADMIN — ATORVASTATIN CALCIUM 40 MILLIGRAM(S): 80 TABLET, FILM COATED ORAL at 21:36

## 2021-05-24 RX ADMIN — Medication 1 SPRAY(S): at 18:37

## 2021-05-24 RX ADMIN — Medication 1 SPRAY(S): at 05:58

## 2021-05-24 RX ADMIN — Medication 90 MILLIGRAM(S): at 05:58

## 2021-05-24 RX ADMIN — POLYETHYLENE GLYCOL 3350 17 GRAM(S): 17 POWDER, FOR SOLUTION ORAL at 12:08

## 2021-05-24 RX ADMIN — NYSTATIN CREAM 1 APPLICATION(S): 100000 CREAM TOPICAL at 05:58

## 2021-05-24 RX ADMIN — ENOXAPARIN SODIUM 40 MILLIGRAM(S): 100 INJECTION SUBCUTANEOUS at 18:36

## 2021-05-24 RX ADMIN — SODIUM CHLORIDE 3 MILLILITER(S): 9 INJECTION INTRAMUSCULAR; INTRAVENOUS; SUBCUTANEOUS at 21:28

## 2021-05-24 RX ADMIN — SODIUM CHLORIDE 3 MILLILITER(S): 9 INJECTION INTRAMUSCULAR; INTRAVENOUS; SUBCUTANEOUS at 05:57

## 2021-05-24 RX ADMIN — SODIUM CHLORIDE 3 MILLILITER(S): 9 INJECTION INTRAMUSCULAR; INTRAVENOUS; SUBCUTANEOUS at 14:32

## 2021-05-24 RX ADMIN — Medication 50 MILLIGRAM(S): at 18:37

## 2021-05-24 RX ADMIN — ENOXAPARIN SODIUM 40 MILLIGRAM(S): 100 INJECTION SUBCUTANEOUS at 05:58

## 2021-05-24 RX ADMIN — Medication 81 MILLIGRAM(S): at 12:08

## 2021-05-24 RX ADMIN — Medication 650 MILLIGRAM(S): at 12:15

## 2021-05-24 RX ADMIN — Medication 0.1 MILLIGRAM(S): at 21:37

## 2021-05-24 RX ADMIN — NYSTATIN CREAM 1 APPLICATION(S): 100000 CREAM TOPICAL at 18:37

## 2021-05-24 NOTE — PROGRESS NOTE ADULT - SUBJECTIVE AND OBJECTIVE BOX
Neurology Progress Note    S: Patient seen and examined. No new events overnight. patient denied CP, SOB, HA or pain. PMR rec AR.  c/o pain L foot/ cramp.  CT L spine obtained with LUPILLOD. awaiting further imaging MR JUS spien vs myelogram       Medication:  MEDICATIONS  (STANDING):  aspirin enteric coated 81 milliGRAM(s) Oral daily  atorvastatin 40 milliGRAM(s) Oral at bedtime  cloNIDine 0.1 milliGRAM(s) Oral at bedtime  dextrose 40% Gel 15 Gram(s) Oral once  dextrose 5%. 1000 milliLiter(s) (50 mL/Hr) IV Continuous <Continuous>  dextrose 5%. 1000 milliLiter(s) (100 mL/Hr) IV Continuous <Continuous>  dextrose 50% Injectable 25 Gram(s) IV Push once  dextrose 50% Injectable 12.5 Gram(s) IV Push once  dextrose 50% Injectable 25 Gram(s) IV Push once  enoxaparin Injectable 40 milliGRAM(s) SubCutaneous every 12 hours  fluticasone propionate 50 MICROgram(s)/spray Nasal Spray 1 Spray(s) Both Nostrils two times a day  glucagon  Injectable 1 milliGRAM(s) IntraMuscular once  hydrochlorothiazide 50 milliGRAM(s) Oral <User Schedule>  insulin lispro (ADMELOG) corrective regimen sliding scale   SubCutaneous three times a day before meals  insulin lispro (ADMELOG) corrective regimen sliding scale   SubCutaneous at bedtime  NIFEdipine XL 90 milliGRAM(s) Oral daily  nystatin Powder 1 Application(s) Topical two times a day  polyethylene glycol 3350 17 Gram(s) Oral daily  sodium chloride 0.9% lock flush 3 milliLiter(s) IV Push every 8 hours    MEDICATIONS  (PRN):  acetaminophen   Tablet .. 650 milliGRAM(s) Oral every 6 hours PRN Mild Pain (1 - 3), Moderate Pain (4 - 6)        Vitals:  Vital Signs Last 24 Hrs  T(C): 36.7 (24 May 2021 09:48), Max: 36.9 (23 May 2021 21:07)  T(F): 98 (24 May 2021 09:48), Max: 98.4 (23 May 2021 21:07)  HR: 77 (24 May 2021 09:48) (68 - 79)  BP: 150/66 (24 May 2021 09:48) (146/77 - 168/78)  BP(mean): --  RR: 17 (24 May 2021 09:48) (17 - 18)  SpO2: 96% (24 May 2021 09:48) (95% - 98%)    General:  Constitutional: Obese Female, appears stated age, in no apparent distress including pain  Head: Normocephalic & atraumatic.    Neurological (>12):  MS: Awake, alert, oriented to person, place, situation, time. Normal affect. Follows all commands.    Language: Speech is clear, fluent with good repetition & comprehension.    CNs: PERRLA (R = 3mm, L = 3mm). VF intact in all 4 quadrants, EOMI no nystagmus. Some reduced sensation to pinprick on the L side of the face, intact to LT throughout, well developed masseter muscles b/l. No facial asymmetry b/l, full eye closure strength b/l. Symmetric palate elevation in midline. Shoulder shrug intact b/l. Tongue midline, normal movements, no atrophy.    Motor: Normal muscle bulk & tone RUE. . No noticeable tremor or seizure. Noted L arm drift- improved. LLE 2-3, now 1-2/5. RLE 3-4/5    Sensation: Reduced sensation to pinprick in the distal L leg and somewhat on the lateral thigh. Reduced sensation to pinprick on the distal L arm. Intact to vibratory sense and proprioception throughout.    Reflexes:              Biceps(C5)       BR(C6)     Triceps(C7)               Patellar(L4)    Achilles(S1)    Plantar Resp  R	2	          2	             2		        0		    1		Mute   L	2	          2	             2		        0		    1		Mute     Coordination: No dysmetria to FTN    Gait: Deferred    I personally reviewed the below data/images/labs:    CBC Full  -  ( 24 May 2021 07:19 )  WBC Count : 4.80 K/uL  RBC Count : 5.30 M/uL  Hemoglobin : 14.7 g/dL  Hematocrit : 47.5 %  Platelet Count - Automated : 234 K/uL  Mean Cell Volume : 89.6 fL  Mean Cell Hemoglobin : 27.7 pg  Mean Cell Hemoglobin Concentration : 30.9 gm/dL  Auto Neutrophil # : 2.36 K/uL  Auto Lymphocyte # : 1.35 K/uL  Auto Monocyte # : 0.48 K/uL  Auto Eosinophil # : 0.54 K/uL  Auto Basophil # : 0.05 K/uL  Auto Neutrophil % : 49.2 %  Auto Lymphocyte % : 28.1 %  Auto Monocyte % : 10.0 %  Auto Eosinophil % : 11.3 %  Auto Basophil % : 1.0 %        136  |  101  |  37<H>  ----------------------------<  123<H>  3.7   |  20<L>  |  1.16    Ca    10.3      24 May 2021 07:19        PTT - ( 16 May 2021 18:56 )  PTT:37.4 sec  Urinalysis Basic - ( 17 May 2021 06:51 )    Color: Light Yellow / Appearance: Clear / S.037 / pH: x  Gluc: x / Ketone: Small  / Bili: Negative / Urobili: <2 mg/dL   Blood: x / Protein: Trace / Nitrite: Negative   Leuk Esterase: Negative / RBC: 0 /HPF / WBC 1 /HPF   Sq Epi: x / Non Sq Epi: Occasional / Bacteria: Few      < from: CT Hip No Cont, Left (21 @ 23:06) >    EXAM:  CT HIP ONLY LT        PROCEDURE DATE:  May 16 2021         INTERPRETATION:  HISTORY: Left-sided hip pain.    Helical CT imaging of the left hip was performed without intravenous contrast. Sagittal and coronal reformats were provided. 3-D reformats were performed on a separate workstation.    Correlation is made to prior radiographs from a 2021.    FINDINGS:    There is no evidence of acute fracture or dislocation. There is severe left hip arthrosis with prominent subchondral cystic change along the posterior margin of the articulation. There is mild bilateral sacral iliac joint arthrosis.    There is no subcutaneous or intramuscular hematoma. There is a calcified uterine fibroid. There is colonic diverticulosis.    IMPRESSION:    Severe left hip arthrosis.    No evidence of acute fracture or dislocation.              ANNE VIDALES MD; Attending Radiologist  This document has been electronically signed. May 17 2021  8:50AM    < end of copied text >  < from: CT Angio Head w/ IV Cont (21 @ 23:06) >    EXAM:  CT ANGIO BRAIN (W)AW IC      EXAM:  CT ANGIO NECK (W)AW IC        PROCEDURE DATE:  May 16 2021         INTERPRETATION:  HISTORY: Left lower extremity weakness and slurred speech, for 2-3 days    COMPARISON: No similar prior    TECHNIQUE: Noncontrast CT head and CT angiogram of the neck and brain was performed after administration of intravenous contrast. MIP and 3D reconstructions were performed.    Total of 90 cc Omnipaque 350 intravenous contrast were administered without complication.10 cc discarded.    FINDINGS:    CT HEAD:    No acute intracranial hemorrhage, or CT evidence of acute, large territorial infarct. There is an age-indeterminate lacunar infarct in the left thalamus.. No hydrocephalus. Basilar cisterns are clear. Scalp and imaged midfacial soft tissues are unremarkable. Calvarium is intact.    CTA BRAIN  This study is degraded by venous contamination.    INTERNAL CAROTID ARTERIES:  The intracranial segments of the ICA are patent without hemodynamically significantstenosis, occlusion, or aneurysm. The ICA bifurcations are unremarkable.    ANTERIOR CEREBRAL ARTERIES: No proximal flow-limiting stenosis or occlusion. Anterior communicating artery is unremarkable without aneurysm.    MIDDLE CEREBRAL ARTERIES: No proximal flow-limiting stenosis or occlusion. MCA bifurcations are unremarkable without aneurysm.    POSTERIOR CEREBRAL ARTERIES: No proximal flow-limiting stenosis or occlusion. Posterior communicating artery is well-developed on the right.    VERTEBROBASILAR SYSTEM: The right vertebral artery predominantly terminates in the PICA. The basilar flow is predominantly supplied by the left vertebral artery. The distal left V4 segment demonstrates short segment of attenuated flow, estimated at 50%. The basilar tip is unremarkable    CTA NECK  This study is degraded by combination of streak artifact from the patient's body habitus and shoulder positioning as well and motion.    RIGHT CAROTID SYSTEM: Normal in course and caliber without flow-limiting stenosis or occlusion.    LEFT CAROTID SYSTEM: Normal in course and caliber without flow-limiting stenosis or occlusion.    VERTEBRAL SYSTEM: Very limited evaluation of the origin and majority of the cervical portions of the bilateral vertebral arteries due to the above limitations. The arteries are grossly patent.    AORTIC ARCH: Typical three-vessel arch..    IMPRESSION:    CT HEAD: Age-indeterminate lacunar infarct in the left thalamus. No acute intracranial hemorrhage.    CTA BRAIN: Degraded exam. No evidence of proximal vessel occlusion or flow-limiting stenosis.    CTA NECK: Degraded exam, with essentially nondiagnostic evaluation of the vertebral arteries. No occlusion or flow-limiting stenosis in the carotid arteries.            SINDI ESQUEDA MD, Resident Radiology  This document has been electronically signed.  ASUNCION GARRIDO MD; Attending Radiologist  This document has been electronically signed. May 16 2021 11:51PM    < end of copied text >  < from: MR Head No Cont (21 @ 13:31) >    EXAM:  MR BRAIN        PROCEDURE DATE:  May 18 2021         INTERPRETATION:  CLINICAL STATEMENT: Left-sided weakness    TECHNIQUE: MRI of the brain was performed without gadolinium.    COMPARISON: CT head 2021    FINDINGS:  Incomplete exam dueto technical failure. Only the sagittal and axial T1 sequences obtained.    No midline shift. No gross mass effect. No hydrocephalus. Probable chronic lacunar infarct left thalamus    Nonspecific diffuse low T1 bone marrow signal noted which could berelated to red marrow hyperplasia in the correct clinical setting.    IMPRESSION:  Incomplete exam. Repeat exam recommended.        TATIANA CHESTER MD; Attending Radiologist  This document has been electronically signed. May 18 2021  2:12PM    < end of copied text >  < from: CT Lumbar Spine w/ IV Cont (21 @ 11:44) >    EXAM:  CT LUMBAR SPINE IC        PROCEDURE DATE:  May 20 2021         INTERPRETATION:  Clinical indication: Lower extremity weakness    Multiple axial sections were performed through the lumbar spine. Coronal and sagittal reconstructions were performed as well.    This exam is somewhat limited by motion.    Scoliosis is seen.    Loss of the normal lumbar lordosis is seen.    Schmorl's nodes are seen multiple levels    Disc space narrowing endplate sclerotic change and osteophytes are seen at multiple levels. This is most prominent at the L5-S1 level and secondary to chronic degenerative changes.    Vacuum disc changes seen involving the L3-4 level which is secondary to chronic degenerative change.    T11-12: Normal    T12-L1: Normal    L1-2: Normal    L2-3: Disc bulge and bilateral hypertrophic facet changes are seen. Moderate to severe narrowing of the spinal canal is seen.    L3-4: Disc bulge and bilateral hypertrophic facet joint changes are seen. Moderate to severe narrowing spinal canal is seen. Mild to moderate narrowing of the left neural foramen    L4-5: Disc bulge and bilateral hypertrophic facet changes. Moderate narrowing of the spinal canal. Severe narrowing of the left neural foramen.    L5-S1: Disc bulge and bilateral hypertrophic facet joint changes. Moderate to severe narrowing of the left neural foramen.    Evaluation of the paraspinal soft tissues is limited by lack of IV contrast though grossly normal    Calcified fibroid is seen involving the pelvic region.    There is evidence of an IUD seen in the uterus.    IMPRESSION: Degenerative changes as described above.    MRI can be done for further evaluation if there are no contraindications.              HEVER GOODRICH M.D., ATTENDING RADIOLOGIST  This document has been electronically signed. May 20 2021 11:58AM    < end of copied text >

## 2021-05-24 NOTE — PROGRESS NOTE ADULT - SUBJECTIVE AND OBJECTIVE BOX
[facility] Division of Hospital Medicine  Stacie Machado MD  Pager [phone]    Patient is a 60y old  Female who presents with a chief complaint of Pt had fallen after sudden weakness in left leg walking up steps.       SUBJECTIVE / OVERNIGHT EVENTS: pt states she was able to move her left leg a little bit but now its sore bc she did exercises with PT      MEDICATIONS  (STANDING):  aspirin enteric coated 81 milliGRAM(s) Oral daily  atorvastatin 40 milliGRAM(s) Oral at bedtime  cloNIDine 0.1 milliGRAM(s) Oral at bedtime  dextrose 40% Gel 15 Gram(s) Oral once  dextrose 5%. 1000 milliLiter(s) (50 mL/Hr) IV Continuous <Continuous>  dextrose 5%. 1000 milliLiter(s) (100 mL/Hr) IV Continuous <Continuous>  dextrose 50% Injectable 25 Gram(s) IV Push once  dextrose 50% Injectable 12.5 Gram(s) IV Push once  dextrose 50% Injectable 25 Gram(s) IV Push once  enoxaparin Injectable 40 milliGRAM(s) SubCutaneous every 12 hours  fluticasone propionate 50 MICROgram(s)/spray Nasal Spray 1 Spray(s) Both Nostrils two times a day  glucagon  Injectable 1 milliGRAM(s) IntraMuscular once  hydrochlorothiazide 50 milliGRAM(s) Oral <User Schedule>  insulin lispro (ADMELOG) corrective regimen sliding scale   SubCutaneous three times a day before meals  insulin lispro (ADMELOG) corrective regimen sliding scale   SubCutaneous at bedtime  NIFEdipine XL 90 milliGRAM(s) Oral daily  nystatin Powder 1 Application(s) Topical two times a day  polyethylene glycol 3350 17 Gram(s) Oral daily  sodium chloride 0.9% lock flush 3 milliLiter(s) IV Push every 8 hours    MEDICATIONS  (PRN):  acetaminophen   Tablet .. 650 milliGRAM(s) Oral every 6 hours PRN Mild Pain (1 - 3), Moderate Pain (4 - 6)      CAPILLARY BLOOD GLUCOSE  POCT Blood Glucose.: 117 mg/dL (24 May 2021 12:51)  POCT Blood Glucose.: 133 mg/dL (24 May 2021 08:59)  POCT Blood Glucose.: 119 mg/dL (23 May 2021 21:28)  POCT Blood Glucose.: 128 mg/dL (23 May 2021 17:52)        PHYSICAL EXAM:  Vital Signs Last 24 Hrs  T(F): 98 (24 May 2021 09:48), Max: 98.4 (23 May 2021 21:07)  HR: 77 (24 May 2021 09:48) (68 - 79)  BP: 150/66 (24 May 2021 09:48) (146/77 - 152/88)  RR: 17 (24 May 2021 09:48) (17 - 18)  SpO2: 96% (24 May 2021 09:48) (95% - 98%)    CONSTITUTIONAL: NAD, morbidly obese  RESPIRATORY: Normal respiratory effort; lungs are clear to auscultation bilaterally ant/lat  CARDIOVASCULAR: Regular rate and rhythm,No lower extremity edema;   ABDOMEN: Nontender to palpation, normoactive bowel sounds, obese  MUSCULOSKELETAL:  no joint swelling or tenderness to palpation  PSYCH: A+O to person, place, and time; affect appropriate  NEUROLOGY: LLE 1-2/5 strength today      LABS:                        14.7   4.80  )-----------( 234      ( 24 May 2021 07:19 )             47.5     05-24    136  |  101  |  37<H>  ----------------------------<  123<H>  3.7   |  20<L>  |  1.16    Ca    10.3      24 May 2021 07:19

## 2021-05-24 NOTE — PROGRESS NOTE ADULT - PROBLEM SELECTOR PLAN 5
- DVT PPx - IMPROVE score - 3, At Risk -  Lovenox 40mg Q12hrs for BMI >35  - Anticipate dc to acute rehab, unable to sundar MRI, poss CT myelogram

## 2021-05-25 DIAGNOSIS — E87.6 HYPOKALEMIA: ICD-10-CM

## 2021-05-25 LAB
ANION GAP SERPL CALC-SCNC: 12 MMOL/L — SIGNIFICANT CHANGE UP (ref 7–14)
BUN SERPL-MCNC: 42 MG/DL — HIGH (ref 7–23)
CALCIUM SERPL-MCNC: 10.3 MG/DL — SIGNIFICANT CHANGE UP (ref 8.4–10.5)
CHLORIDE SERPL-SCNC: 102 MMOL/L — SIGNIFICANT CHANGE UP (ref 98–107)
CO2 SERPL-SCNC: 22 MMOL/L — SIGNIFICANT CHANGE UP (ref 22–31)
CREAT SERPL-MCNC: 1.24 MG/DL — SIGNIFICANT CHANGE UP (ref 0.5–1.3)
GLUCOSE BLDC GLUCOMTR-MCNC: 102 MG/DL — HIGH (ref 70–99)
GLUCOSE BLDC GLUCOMTR-MCNC: 108 MG/DL — HIGH (ref 70–99)
GLUCOSE BLDC GLUCOMTR-MCNC: 115 MG/DL — HIGH (ref 70–99)
GLUCOSE BLDC GLUCOMTR-MCNC: 138 MG/DL — HIGH (ref 70–99)
GLUCOSE SERPL-MCNC: 118 MG/DL — HIGH (ref 70–99)
HCT VFR BLD CALC: 45.9 % — HIGH (ref 34.5–45)
HGB BLD-MCNC: 14.8 G/DL — SIGNIFICANT CHANGE UP (ref 11.5–15.5)
MAGNESIUM SERPL-MCNC: 1.9 MG/DL — SIGNIFICANT CHANGE UP (ref 1.6–2.6)
MCHC RBC-ENTMCNC: 28 PG — SIGNIFICANT CHANGE UP (ref 27–34)
MCHC RBC-ENTMCNC: 32.2 GM/DL — SIGNIFICANT CHANGE UP (ref 32–36)
MCV RBC AUTO: 86.9 FL — SIGNIFICANT CHANGE UP (ref 80–100)
NRBC # BLD: 0 /100 WBCS — SIGNIFICANT CHANGE UP
NRBC # FLD: 0 K/UL — SIGNIFICANT CHANGE UP
PHOSPHATE SERPL-MCNC: 3.6 MG/DL — SIGNIFICANT CHANGE UP (ref 2.5–4.5)
PLATELET # BLD AUTO: 272 K/UL — SIGNIFICANT CHANGE UP (ref 150–400)
POTASSIUM SERPL-MCNC: 3.4 MMOL/L — LOW (ref 3.5–5.3)
POTASSIUM SERPL-SCNC: 3.4 MMOL/L — LOW (ref 3.5–5.3)
RBC # BLD: 5.28 M/UL — HIGH (ref 3.8–5.2)
RBC # FLD: 15.1 % — HIGH (ref 10.3–14.5)
SODIUM SERPL-SCNC: 136 MMOL/L — SIGNIFICANT CHANGE UP (ref 135–145)
WBC # BLD: 4.68 K/UL — SIGNIFICANT CHANGE UP (ref 3.8–10.5)
WBC # FLD AUTO: 4.68 K/UL — SIGNIFICANT CHANGE UP (ref 3.8–10.5)

## 2021-05-25 PROCEDURE — 99232 SBSQ HOSP IP/OBS MODERATE 35: CPT

## 2021-05-25 PROCEDURE — 93010 ELECTROCARDIOGRAM REPORT: CPT

## 2021-05-25 RX ORDER — HYDRALAZINE HCL 50 MG
5 TABLET ORAL ONCE
Refills: 0 | Status: DISCONTINUED | OUTPATIENT
Start: 2021-05-25 | End: 2021-05-25

## 2021-05-25 RX ORDER — POTASSIUM CHLORIDE 20 MEQ
20 PACKET (EA) ORAL ONCE
Refills: 0 | Status: COMPLETED | OUTPATIENT
Start: 2021-05-25 | End: 2021-05-25

## 2021-05-25 RX ADMIN — Medication 81 MILLIGRAM(S): at 12:37

## 2021-05-25 RX ADMIN — Medication 650 MILLIGRAM(S): at 22:28

## 2021-05-25 RX ADMIN — SODIUM CHLORIDE 3 MILLILITER(S): 9 INJECTION INTRAMUSCULAR; INTRAVENOUS; SUBCUTANEOUS at 21:13

## 2021-05-25 RX ADMIN — ATORVASTATIN CALCIUM 40 MILLIGRAM(S): 80 TABLET, FILM COATED ORAL at 21:29

## 2021-05-25 RX ADMIN — SODIUM CHLORIDE 3 MILLILITER(S): 9 INJECTION INTRAMUSCULAR; INTRAVENOUS; SUBCUTANEOUS at 14:13

## 2021-05-25 RX ADMIN — SODIUM CHLORIDE 3 MILLILITER(S): 9 INJECTION INTRAMUSCULAR; INTRAVENOUS; SUBCUTANEOUS at 07:32

## 2021-05-25 RX ADMIN — Medication 0.1 MILLIGRAM(S): at 21:29

## 2021-05-25 RX ADMIN — ENOXAPARIN SODIUM 40 MILLIGRAM(S): 100 INJECTION SUBCUTANEOUS at 18:47

## 2021-05-25 RX ADMIN — NYSTATIN CREAM 1 APPLICATION(S): 100000 CREAM TOPICAL at 18:48

## 2021-05-25 RX ADMIN — Medication 90 MILLIGRAM(S): at 12:37

## 2021-05-25 RX ADMIN — Medication 1 SPRAY(S): at 05:57

## 2021-05-25 RX ADMIN — NYSTATIN CREAM 1 APPLICATION(S): 100000 CREAM TOPICAL at 05:57

## 2021-05-25 RX ADMIN — Medication 1 SPRAY(S): at 18:48

## 2021-05-25 RX ADMIN — Medication 650 MILLIGRAM(S): at 23:28

## 2021-05-25 RX ADMIN — Medication 20 MILLIEQUIVALENT(S): at 12:38

## 2021-05-25 RX ADMIN — Medication 50 MILLIGRAM(S): at 18:48

## 2021-05-25 RX ADMIN — ENOXAPARIN SODIUM 40 MILLIGRAM(S): 100 INJECTION SUBCUTANEOUS at 05:58

## 2021-05-25 NOTE — PROGRESS NOTE ADULT - SUBJECTIVE AND OBJECTIVE BOX
Neurology Progress Note    S: Patient seen and examined. No new events overnight. patient denied CP, SOB, HA or pain. PMR rec AR.   CT L spine obtained with LUPILLOD. awaiting further imaging MR JUS lopes vs myelogram       Medication:  MEDICATIONS  (STANDING):  aspirin enteric coated 81 milliGRAM(s) Oral daily  atorvastatin 40 milliGRAM(s) Oral at bedtime  cloNIDine 0.1 milliGRAM(s) Oral at bedtime  dextrose 40% Gel 15 Gram(s) Oral once  dextrose 5%. 1000 milliLiter(s) (50 mL/Hr) IV Continuous <Continuous>  dextrose 5%. 1000 milliLiter(s) (100 mL/Hr) IV Continuous <Continuous>  dextrose 50% Injectable 25 Gram(s) IV Push once  dextrose 50% Injectable 12.5 Gram(s) IV Push once  dextrose 50% Injectable 25 Gram(s) IV Push once  enoxaparin Injectable 40 milliGRAM(s) SubCutaneous every 12 hours  fluticasone propionate 50 MICROgram(s)/spray Nasal Spray 1 Spray(s) Both Nostrils two times a day  glucagon  Injectable 1 milliGRAM(s) IntraMuscular once  hydrochlorothiazide 50 milliGRAM(s) Oral <User Schedule>  insulin lispro (ADMELOG) corrective regimen sliding scale   SubCutaneous three times a day before meals  insulin lispro (ADMELOG) corrective regimen sliding scale   SubCutaneous at bedtime  NIFEdipine XL 90 milliGRAM(s) Oral daily  nystatin Powder 1 Application(s) Topical two times a day  polyethylene glycol 3350 17 Gram(s) Oral daily  sodium chloride 0.9% lock flush 3 milliLiter(s) IV Push every 8 hours    MEDICATIONS  (PRN):  acetaminophen   Tablet .. 650 milliGRAM(s) Oral every 6 hours PRN Mild Pain (1 - 3), Moderate Pain (4 - 6)        Vitals:  Vital Signs Last 24 Hrs  T(C): 36.5 (25 May 2021 05:15), Max: 36.9 (24 May 2021 17:42)  T(F): 97.7 (25 May 2021 05:15), Max: 98.5 (24 May 2021 21:37)  HR: 68 (25 May 2021 07:33) (64 - 85)  BP: 157/88 (25 May 2021 07:33) (149/85 - 177/91)  BP(mean): --  RR: 18 (25 May 2021 05:15) (17 - 18)  SpO2: 96% (25 May 2021 05:15) (95% - 98%)    General:  Constitutional: Obese Female, appears stated age, in no apparent distress including pain  Head: Normocephalic & atraumatic.    Neurological (>12):  MS: Awake, alert, oriented to person, place, situation, time. Normal affect. Follows all commands.    Language: Speech is clear, fluent with good repetition & comprehension.    CNs: PERRLA (R = 3mm, L = 3mm). VF intact in all 4 quadrants, EOMI no nystagmus. Some reduced sensation to pinprick on the L side of the face, intact to LT throughout, well developed masseter muscles b/l. No facial asymmetry b/l, full eye closure strength b/l. Symmetric palate elevation in midline. Shoulder shrug intact b/l. Tongue midline, normal movements, no atrophy.    Motor: Normal muscle bulk & tone RUE. . No noticeable tremor or seizure. Noted L arm drift- improved. LLE 2-3, now 1-2/5. RLE 3-4/5    Sensation: Reduced sensation to pinprick in the distal L leg and somewhat on the lateral thigh. Reduced sensation to pinprick on the distal L arm. Intact to vibratory sense and proprioception throughout.    Reflexes:              Biceps(C5)       BR(C6)     Triceps(C7)               Patellar(L4)    Achilles(S1)    Plantar Resp  R	2	          2	             2		        0		    1		Mute   L	2	          2	             2		        0		    1		Mute     Coordination: No dysmetria to FTN    Gait: Deferred    I personally reviewed the below data/images/labs:    CBC Full  -  ( 25 May 2021 07:20 )  WBC Count : 4.68 K/uL  RBC Count : 5.28 M/uL  Hemoglobin : 14.8 g/dL  Hematocrit : 45.9 %  Platelet Count - Automated : 272 K/uL  Mean Cell Volume : 86.9 fL  Mean Cell Hemoglobin : 28.0 pg  Mean Cell Hemoglobin Concentration : 32.2 gm/dL  Auto Neutrophil # : x  Auto Lymphocyte # : x  Auto Monocyte # : x  Auto Eosinophil # : x  Auto Basophil # : x  Auto Neutrophil % : x  Auto Lymphocyte % : x  Auto Monocyte % : x  Auto Eosinophil % : x  Auto Basophil % : x    05-    136  |  102  |  42<H>  ----------------------------<  118<H>  3.4<L>   |  22  |  1.24    Ca    10.3      25 May 2021 07:20  Phos  3.6     05-25  Mg     1.9     05-25        PTT - ( 16 May 2021 18:56 )  PTT:37.4 sec  Urinalysis Basic - ( 17 May 2021 06:51 )    Color: Light Yellow / Appearance: Clear / S.037 / pH: x  Gluc: x / Ketone: Small  / Bili: Negative / Urobili: <2 mg/dL   Blood: x / Protein: Trace / Nitrite: Negative   Leuk Esterase: Negative / RBC: 0 /HPF / WBC 1 /HPF   Sq Epi: x / Non Sq Epi: Occasional / Bacteria: Few      < from: CT Hip No Cont, Left (21 @ 23:06) >    EXAM:  CT HIP ONLY LT        PROCEDURE DATE:  May 16 2021         INTERPRETATION:  HISTORY: Left-sided hip pain.    Helical CT imaging of the left hip was performed without intravenous contrast. Sagittal and coronal reformats were provided. 3-D reformats were performed on a separate workstation.    Correlation is made to prior radiographs from a 2021.    FINDINGS:    There is no evidence of acute fracture or dislocation. There is severe left hip arthrosis with prominent subchondral cystic change along the posterior margin of the articulation. There is mild bilateral sacral iliac joint arthrosis.    There is no subcutaneous or intramuscular hematoma. There is a calcified uterine fibroid. There is colonic diverticulosis.    IMPRESSION:    Severe left hip arthrosis.    No evidence of acute fracture or dislocation.              ANNE VIDALES MD; Attending Radiologist  This document has been electronically signed. May 17 2021  8:50AM    < end of copied text >  < from: CT Angio Head w/ IV Cont (21 @ 23:06) >    EXAM:  CT ANGIO BRAIN (W)AW IC      EXAM:  CT ANGIO NECK (W)AW IC        PROCEDURE DATE:  May 16 2021         INTERPRETATION:  HISTORY: Left lower extremity weakness and slurred speech, for 2-3 days    COMPARISON: No similar prior    TECHNIQUE: Noncontrast CT head and CT angiogram of the neck and brain was performed after administration of intravenous contrast. MIP and 3D reconstructions were performed.    Total of 90 cc Omnipaque 350 intravenous contrast were administered without complication.10 cc discarded.    FINDINGS:    CT HEAD:    No acute intracranial hemorrhage, or CT evidence of acute, large territorial infarct. There is an age-indeterminate lacunar infarct in the left thalamus.. No hydrocephalus. Basilar cisterns are clear. Scalp and imaged midfacial soft tissues are unremarkable. Calvarium is intact.    CTA BRAIN  This study is degraded by venous contamination.    INTERNAL CAROTID ARTERIES:  The intracranial segments of the ICA are patent without hemodynamically significantstenosis, occlusion, or aneurysm. The ICA bifurcations are unremarkable.    ANTERIOR CEREBRAL ARTERIES: No proximal flow-limiting stenosis or occlusion. Anterior communicating artery is unremarkable without aneurysm.    MIDDLE CEREBRAL ARTERIES: No proximal flow-limiting stenosis or occlusion. MCA bifurcations are unremarkable without aneurysm.    POSTERIOR CEREBRAL ARTERIES: No proximal flow-limiting stenosis or occlusion. Posterior communicating artery is well-developed on the right.    VERTEBROBASILAR SYSTEM: The right vertebral artery predominantly terminates in the PICA. The basilar flow is predominantly supplied by the left vertebral artery. The distal left V4 segment demonstrates short segment of attenuated flow, estimated at 50%. The basilar tip is unremarkable    CTA NECK  This study is degraded by combination of streak artifact from the patient's body habitus and shoulder positioning as well and motion.    RIGHT CAROTID SYSTEM: Normal in course and caliber without flow-limiting stenosis or occlusion.    LEFT CAROTID SYSTEM: Normal in course and caliber without flow-limiting stenosis or occlusion.    VERTEBRAL SYSTEM: Very limited evaluation of the origin and majority of the cervical portions of the bilateral vertebral arteries due to the above limitations. The arteries are grossly patent.    AORTIC ARCH: Typical three-vessel arch..    IMPRESSION:    CT HEAD: Age-indeterminate lacunar infarct in the left thalamus. No acute intracranial hemorrhage.    CTA BRAIN: Degraded exam. No evidence of proximal vessel occlusion or flow-limiting stenosis.    CTA NECK: Degraded exam, with essentially nondiagnostic evaluation of the vertebral arteries. No occlusion or flow-limiting stenosis in the carotid arteries.            SINDI ESQUEDA MD, Resident Radiology  This document has been electronically signed.  ASUNCION GARRIDO MD; Attending Radiologist  This document has been electronically signed. May 16 2021 11:51PM    < end of copied text >  < from: MR Head No Cont (05.18.21 @ 13:31) >    EXAM:  MR BRAIN        PROCEDURE DATE:  May 18 2021         INTERPRETATION:  CLINICAL STATEMENT: Left-sided weakness    TECHNIQUE: MRI of the brain was performed without gadolinium.    COMPARISON: CT head 2021    FINDINGS:  Incomplete exam dueto technical failure. Only the sagittal and axial T1 sequences obtained.    No midline shift. No gross mass effect. No hydrocephalus. Probable chronic lacunar infarct left thalamus    Nonspecific diffuse low T1 bone marrow signal noted which could berelated to red marrow hyperplasia in the correct clinical setting.    IMPRESSION:  Incomplete exam. Repeat exam recommended.        TATIANA CHESTER MD; Attending Radiologist  This document has been electronically signed. May 18 2021  2:12PM    < end of copied text >  < from: CT Lumbar Spine w/ IV Cont (21 @ 11:44) >    EXAM:  CT LUMBAR SPINE IC        PROCEDURE DATE:  May 20 2021         INTERPRETATION:  Clinical indication: Lower extremity weakness    Multiple axial sections were performed through the lumbar spine. Coronal and sagittal reconstructions were performed as well.    This exam is somewhat limited by motion.    Scoliosis is seen.    Loss of the normal lumbar lordosis is seen.    Schmorl's nodes are seen multiple levels    Disc space narrowing endplate sclerotic change and osteophytes are seen at multiple levels. This is most prominent at the L5-S1 level and secondary to chronic degenerative changes.    Vacuum disc changes seen involving the L3-4 level which is secondary to chronic degenerative change.    T11-12: Normal    T12-L1: Normal    L1-2: Normal    L2-3: Disc bulge and bilateral hypertrophic facet changes are seen. Moderate to severe narrowing of the spinal canal is seen.    L3-4: Disc bulge and bilateral hypertrophic facet joint changes are seen. Moderate to severe narrowing spinal canal is seen. Mild to moderate narrowing of the left neural foramen    L4-5: Disc bulge and bilateral hypertrophic facet changes. Moderate narrowing of the spinal canal. Severe narrowing of the left neural foramen.    L5-S1: Disc bulge and bilateral hypertrophic facet joint changes. Moderate to severe narrowing of the left neural foramen.    Evaluation of the paraspinal soft tissues is limited by lack of IV contrast though grossly normal    Calcified fibroid is seen involving the pelvic region.    There is evidence of an IUD seen in the uterus.    IMPRESSION: Degenerative changes as described above.    MRI can be done for further evaluation if there are no contraindications.              HEVER GOODRICH M.D., ATTENDING RADIOLOGIST  This document has been electronically signed. May 20 2021 11:58AM    < end of copied text >

## 2021-05-25 NOTE — PROGRESS NOTE ADULT - PROBLEM SELECTOR PLAN 5
- DVT PPx - IMPROVE score - 3, At Risk -  Lovenox 40mg Q12hrs for BMI >35  - Anticipate dc to acute rehab, await CT myelogram

## 2021-05-25 NOTE — PROGRESS NOTE ADULT - SUBJECTIVE AND OBJECTIVE BOX
HPI:  Pt is a  61 yo female with PMHx of Hypertension and DM, Osteoarthritis and B/L Carpel Tunnel.  She denies CAD.  She was seen, treated and discharged from the ER at [facility] yesterday for c/o a few months of leg weakness and  left knee pain  (5/16/21) and upon walking up steps at home, on the 4th step her left leg went totally weak and she sat on the step.  She denies a true fall or hurting her hip, head or any other body part.  She denies associated dizziness, back, hip, knee or ankle pain prior to the left leg weakness. She came back to the ER for repeat evaluation.    CTH & CT angio H&N showed: CT HEAD: Age-indeterminate lacunar infarct in the left thalamus. No acute intracranial hemorrhage.  CTA BRAIN: Degraded exam. No evidence of proximal vessel occlusion or flow-limiting stenosis.  CTA NECK: Degraded exam, with essentially nondiagnostic evaluation of the vertebral arteries. No occlusion or flow-limiting stenosis in the carotid arteries.  CT Left HIP: PRELIM - No acute fracture. Follow up final report.  X-ray KNEE: There is no fracture or dislocation. No knee effusion.  The joint spaces are preserved. No abnormal soft tissue swelling or mineralization.    Admission for further workup is recommended.        (17 May 2021 02:42)    MRI spine was not completed yet. Pt states that they are going to try today or tomorrow to get it done. She is feeling a little better today.          REVIEW OF SYSTEMS  Constitutional - No fever, No weight loss, No fatigue  HEENT - No eye pain, No visual disturbances, No difficulty hearing, No tinnitus, No vertigo, No neck pain  Respiratory - No cough, No wheezing, No shortness of breath  Cardiovascular - No chest pain, No palpitations  Gastrointestinal - No abdominal pain, No nausea, No vomiting, No diarrhea, No constipation  Genitourinary - No dysuria, No frequency, No hematuria, No incontinence  Neurological - No headaches, No memory loss, + loss of strength, + numbness, No tremors  Skin - No itching, No rashes, No lesions   Endocrine - No temperature intolerance  Musculoskeletal - No joint pain, No joint swelling, No muscle pain  Psychiatric - No depression, No anxiety    FUNCTIONAL PROGRESS  5/24  Bed Mobility  Bed Mobility Training Rehab Potential: good, to achieve stated therapy goals  Bed Mobility Training Symptoms Noted During/After Treatment: none  Bed Mobility Training Sit-to-Supine: maximum assist (25% patient effort);  2 person assist;  verbal cues;  set-up required;  bed rails  Bed Mobility Training Supine-to-Sit: maximum assist (25% patient effort);  moderate assist (50% patient effort);  2 person assist;  verbal cues;  set-up required;  bed rails  Bed Mobility Training Limitations: decreased ability to use arms for pushing/pulling;  decreased ability to use legs for bridging/pushing;  impaired ability to control trunk for mobility;  impaired balance;  decreased strength    Therapeutic Exercise  Therapeutic Exercise Rehab Effort: good  Therapeutic Exercise Symptoms Noted During/After Treatment: none  Therapeutic Exercise Detail: Patient performed seated balancing activities and therapeutic exercises to extremities as tolerated in all planes while seated and supine in bed .         VITALS  T(C): 36.3 (05-25-21 @ 12:29), Max: 36.9 (05-24-21 @ 17:42)  HR: 76 (05-25-21 @ 12:29) (64 - 85)  BP: 153/87 (05-25-21 @ 12:29) (149/85 - 177/91)  RR: 18 (05-25-21 @ 12:29) (18 - 18)  SpO2: 96% (05-25-21 @ 12:29) (95% - 99%)  Wt(kg): --    MEDICATIONS   acetaminophen   Tablet .. 650 milliGRAM(s) every 6 hours PRN  aspirin enteric coated 81 milliGRAM(s) daily  atorvastatin 40 milliGRAM(s) at bedtime  cloNIDine 0.1 milliGRAM(s) at bedtime  dextrose 40% Gel 15 Gram(s) once  dextrose 5%. 1000 milliLiter(s) <Continuous>  dextrose 5%. 1000 milliLiter(s) <Continuous>  dextrose 50% Injectable 25 Gram(s) once  dextrose 50% Injectable 12.5 Gram(s) once  dextrose 50% Injectable 25 Gram(s) once  enoxaparin Injectable 40 milliGRAM(s) every 12 hours  fluticasone propionate 50 MICROgram(s)/spray Nasal Spray 1 Spray(s) two times a day  glucagon  Injectable 1 milliGRAM(s) once  hydrALAZINE Injectable 5 milliGRAM(s) once  hydrochlorothiazide 50 milliGRAM(s) <User Schedule>  insulin lispro (ADMELOG) corrective regimen sliding scale   three times a day before meals  insulin lispro (ADMELOG) corrective regimen sliding scale   at bedtime  NIFEdipine XL 90 milliGRAM(s) daily  nystatin Powder 1 Application(s) two times a day  polyethylene glycol 3350 17 Gram(s) daily  sodium chloride 0.9% lock flush 3 milliLiter(s) every 8 hours      RECENT LABS - Reviewed                        14.8   4.68  )-----------( 272      ( 25 May 2021 07:20 )             45.9     05-25    136  |  102  |  42<H>  ----------------------------<  118<H>  3.4<L>   |  22  |  1.24    Ca    10.3      25 May 2021 07:20  Phos  3.6     05-25  Mg     1.9     05-25        ----------------------------------------------------------------------------------------  PHYSICAL EXAM  Constitutional - NAD, Comfortably sitting up in bed  HEENT - NCAT, EOMI  Neck - Supple, No limited ROM  Chest -no respiratory distress  Cardiovascular - RRR, S1S2   Abdomen -  Soft, NTND  Extremities - No C/C/E, No calf tenderness   Neurologic Exam -                    Cognitive - Awake, Alert, AAO to self, place, date, year, situation     Communication - Fluent, No dysarthria     Cranial Nerves - CN 2-12 intact     Motor -                      LEFT    UE - ShAB 0/5, EF 3/5, EE 2/5, WE 3/5,  3/5                    RIGHT UE - ShAB 5/5, EF 5/5, EE 5/5, WE 5/5,  5/5                    LEFT    LE - HF 1/5, KE 2/5, DF 0/5, PF 1/5                    RIGHT LE - HF 3/5, KE 3/5, DF 3/5, PF 3/5        Sensory - Intact to LT except for LLE decreased to lt      OculoVestibular - No saccades, No nystagmus, VOR         Balance - WNL Static  Psychiatric - Mood stable, Affect WNL  ----------------------------------------------------------------------------------------  ASSESSMENT/PLAN   61 yo RHD female with PMHx of Hypertension and DM, Osteoarthritis presented with left LE and left UE weakness, possible cva.  chronic lacunar infarct left thalamus detected on incomplete mri exam.   CT revealed moderate to severe spinal cord narrowing, neurosurgery following, MRI follow up    MRI spine still pending   possible cva: on asa, statin  hypertension: HCTZ, Nifedipine, Clonidine  constipation: miralax, bowel regimen as needed  Pain -denies  DVT PPX - lovenox  diet: regular consistent carb dash/tlc  slp- patient reports difficulty pronouncing words at times    Rehab -    pending progress with bedside therapy and mri results, recommend acute inpatient rehab when medically cleared. Patient can tolerate 3 hours per day of therapy with medical supervision.              HPI:  Pt is a  59 yo female with PMHx of Hypertension and DM, Osteoarthritis and B/L Carpel Tunnel.  She denies CAD.  She was seen, treated and discharged from the ER at [facility] yesterday for c/o a few months of leg weakness and  left knee pain  (5/16/21) and upon walking up steps at home, on the 4th step her left leg went totally weak and she sat on the step.  She denies a true fall or hurting her hip, head or any other body part.  She denies associated dizziness, back, hip, knee or ankle pain prior to the left leg weakness. She came back to the ER for repeat evaluation.    CTH & CT angio H&N showed: CT HEAD: Age-indeterminate lacunar infarct in the left thalamus. No acute intracranial hemorrhage.  CTA BRAIN: Degraded exam. No evidence of proximal vessel occlusion or flow-limiting stenosis.  CTA NECK: Degraded exam, with essentially nondiagnostic evaluation of the vertebral arteries. No occlusion or flow-limiting stenosis in the carotid arteries.  CT Left HIP: PRELIM - No acute fracture. Follow up final report.  X-ray KNEE: There is no fracture or dislocation. No knee effusion.  The joint spaces are preserved. No abnormal soft tissue swelling or mineralization.    Admission for further workup is recommended.        (17 May 2021 02:42)    MRI spine was not completed yet. Pt states that they are going to try today or tomorrow to get it done. She is feeling a little better today.    REVIEW OF SYSTEMS  Constitutional - No fever, No weight loss, No fatigue  HEENT - No eye pain, No visual disturbances, No difficulty hearing, No tinnitus, No vertigo, No neck pain  Respiratory - No cough, No wheezing, No shortness of breath  Cardiovascular - No chest pain, No palpitations  Gastrointestinal - No abdominal pain, No nausea, No vomiting, No diarrhea, No constipation  Genitourinary - No dysuria, No frequency, No hematuria, No incontinence  Neurological - No headaches, No memory loss, + loss of strength, + numbness, No tremors  Skin - No itching, No rashes, No lesions   Endocrine - No temperature intolerance  Musculoskeletal - No joint pain, No joint swelling, No muscle pain  Psychiatric - No depression, No anxiety    FUNCTIONAL PROGRESS  5/24  Bed Mobility  Bed Mobility Training Rehab Potential: good, to achieve stated therapy goals  Bed Mobility Training Symptoms Noted During/After Treatment: none  Bed Mobility Training Sit-to-Supine: maximum assist (25% patient effort);  2 person assist;  verbal cues;  set-up required;  bed rails  Bed Mobility Training Supine-to-Sit: maximum assist (25% patient effort);  moderate assist (50% patient effort);  2 person assist;  verbal cues;  set-up required;  bed rails  Bed Mobility Training Limitations: decreased ability to use arms for pushing/pulling;  decreased ability to use legs for bridging/pushing;  impaired ability to control trunk for mobility;  impaired balance;  decreased strength    Therapeutic Exercise  Therapeutic Exercise Rehab Effort: good  Therapeutic Exercise Symptoms Noted During/After Treatment: none  Therapeutic Exercise Detail: Patient performed seated balancing activities and therapeutic exercises to extremities as tolerated in all planes while seated and supine in bed .         VITALS  T(C): 36.3 (05-25-21 @ 12:29), Max: 36.9 (05-24-21 @ 17:42)  HR: 76 (05-25-21 @ 12:29) (64 - 85)  BP: 153/87 (05-25-21 @ 12:29) (149/85 - 177/91)  RR: 18 (05-25-21 @ 12:29) (18 - 18)  SpO2: 96% (05-25-21 @ 12:29) (95% - 99%)  Wt(kg): --    MEDICATIONS   acetaminophen   Tablet .. 650 milliGRAM(s) every 6 hours PRN  aspirin enteric coated 81 milliGRAM(s) daily  atorvastatin 40 milliGRAM(s) at bedtime  cloNIDine 0.1 milliGRAM(s) at bedtime  dextrose 40% Gel 15 Gram(s) once  dextrose 5%. 1000 milliLiter(s) <Continuous>  dextrose 5%. 1000 milliLiter(s) <Continuous>  dextrose 50% Injectable 25 Gram(s) once  dextrose 50% Injectable 12.5 Gram(s) once  dextrose 50% Injectable 25 Gram(s) once  enoxaparin Injectable 40 milliGRAM(s) every 12 hours  fluticasone propionate 50 MICROgram(s)/spray Nasal Spray 1 Spray(s) two times a day  glucagon  Injectable 1 milliGRAM(s) once  hydrALAZINE Injectable 5 milliGRAM(s) once  hydrochlorothiazide 50 milliGRAM(s) <User Schedule>  insulin lispro (ADMELOG) corrective regimen sliding scale   three times a day before meals  insulin lispro (ADMELOG) corrective regimen sliding scale   at bedtime  NIFEdipine XL 90 milliGRAM(s) daily  nystatin Powder 1 Application(s) two times a day  polyethylene glycol 3350 17 Gram(s) daily  sodium chloride 0.9% lock flush 3 milliLiter(s) every 8 hours      RECENT LABS - Reviewed                        14.8   4.68  )-----------( 272      ( 25 May 2021 07:20 )             45.9     05-25    136  |  102  |  42<H>  ----------------------------<  118<H>  3.4<L>   |  22  |  1.24    Ca    10.3      25 May 2021 07:20  Phos  3.6     05-25  Mg     1.9     05-25        ----------------------------------------------------------------------------------------  PHYSICAL EXAM  Constitutional - NAD, Comfortably sitting up in bed  HEENT - NCAT, EOMI  Neck - Supple, No limited ROM  Chest -no respiratory distress  Cardiovascular - RRR, S1S2   Abdomen -  Soft, NTND  Extremities - No C/C/E, No calf tenderness   Neurologic Exam -                    Cognitive - Awake, Alert, AAO to self, place, date, year, situation     Communication - Fluent, No dysarthria     Cranial Nerves - CN 2-12 intact     Motor -                      LEFT    UE - ShAB 0/5, EF 3/5, EE 2/5, WE 3/5,  3/5                    RIGHT UE - ShAB 5/5, EF 5/5, EE 5/5, WE 5/5,  5/5                    LEFT    LE - HF 1/5, KE 2/5, DF 0/5, PF 1/5                    RIGHT LE - HF 3/5, KE 3/5, DF 3/5, PF 3/5        Sensory - Intact to LT except for LLE decreased to lt      OculoVestibular - No saccades, No nystagmus, VOR         Balance - WNL Static  Psychiatric - Mood stable, Affect WNL  ----------------------------------------------------------------------------------------  ASSESSMENT/PLAN   59 yo RHD female with PMHx of Hypertension and DM, Osteoarthritis presented with left LE and left UE weakness, possible cva.  chronic lacunar infarct left thalamus detected on incomplete mri exam.   CT revealed moderate to severe spinal cord narrowing, neurosurgery following, MRI follow up    MRI spine still pending   possible cva: on asa, statin  hypertension: HCTZ, Nifedipine, Clonidine  constipation: miralax, bowel regimen as needed  Pain -denies  DVT PPX - lovenox  diet: regular consistent carb dash/tlc  slp- patient reports difficulty pronouncing words at times    Rehab -    pending progress with bedside therapy and mri results, recommend acute inpatient rehab when medically cleared. Patient can tolerate 3 hours per day of therapy with medical supervision.

## 2021-05-25 NOTE — PROGRESS NOTE ADULT - SUBJECTIVE AND OBJECTIVE BOX
[facility] Division of Hospital Medicine  Stacie Machado MD  Pager [phone]    Patient is a 60y old  Female who presents with a chief complaint of Pt had fallen after sudden weakness in left leg walking up steps.       SUBJECTIVE / OVERNIGHT EVENTS: in good spirits; able to move leg at hip joint, turning leg left and right; + BM;       MEDICATIONS  (STANDING):  aspirin enteric coated 81 milliGRAM(s) Oral daily  atorvastatin 40 milliGRAM(s) Oral at bedtime  cloNIDine 0.1 milliGRAM(s) Oral at bedtime  dextrose 40% Gel 15 Gram(s) Oral once  dextrose 5%. 1000 milliLiter(s) (50 mL/Hr) IV Continuous <Continuous>  dextrose 5%. 1000 milliLiter(s) (100 mL/Hr) IV Continuous <Continuous>  dextrose 50% Injectable 25 Gram(s) IV Push once  dextrose 50% Injectable 12.5 Gram(s) IV Push once  dextrose 50% Injectable 25 Gram(s) IV Push once  enoxaparin Injectable 40 milliGRAM(s) SubCutaneous every 12 hours  fluticasone propionate 50 MICROgram(s)/spray Nasal Spray 1 Spray(s) Both Nostrils two times a day  glucagon  Injectable 1 milliGRAM(s) IntraMuscular once  hydrALAZINE Injectable 5 milliGRAM(s) IV Push once  hydrochlorothiazide 50 milliGRAM(s) Oral <User Schedule>  insulin lispro (ADMELOG) corrective regimen sliding scale   SubCutaneous three times a day before meals  insulin lispro (ADMELOG) corrective regimen sliding scale   SubCutaneous at bedtime  NIFEdipine XL 90 milliGRAM(s) Oral daily  nystatin Powder 1 Application(s) Topical two times a day  polyethylene glycol 3350 17 Gram(s) Oral daily  potassium chloride    Tablet ER 20 milliEquivalent(s) Oral once  sodium chloride 0.9% lock flush 3 milliLiter(s) IV Push every 8 hours    MEDICATIONS  (PRN):  acetaminophen   Tablet .. 650 milliGRAM(s) Oral every 6 hours PRN Mild Pain (1 - 3), Moderate Pain (4 - 6)      CAPILLARY BLOOD GLUCOSE  POCT Blood Glucose.: 115 mg/dL (25 May 2021 08:55)  POCT Blood Glucose.: 107 mg/dL (24 May 2021 17:29)  POCT Blood Glucose.: 117 mg/dL (24 May 2021 12:51)          PHYSICAL EXAM:  Vital Signs Last 24 Hrs  T(F): 97.6 (25 May 2021 09:14), Max: 98.5 (24 May 2021 21:37)  HR: 84 (25 May 2021 09:14) (64 - 85)  BP: 158/75 (25 May 2021 09:14) (149/85 - 177/91)  RR: 18 (25 May 2021 09:14) (18 - 18)  SpO2: 99% (25 May 2021 09:14) (95% - 99%)    CONSTITUTIONAL: NAD, morbidly obese  RESPIRATORY: Normal respiratory effort; lungs are clear to auscultation ant/lat  CARDIOVASCULAR: Regular rate and rhythm; No lower extremity edema;   ABDOMEN: Nontender to palpation, normoactive bowel sounds  MUSCULOSKELETAL:  no joint swelling or tenderness to palpation  PSYCH: affect appropriate  NEUROLOGY: some movement in LLE today      LABS:                        14.8   4.68  )-----------( 272      ( 25 May 2021 07:20 )             45.9     05-25    136  |  102  |  42<H>  ----------------------------<  118<H>  3.4<L>   |  22  |  1.24    Ca    10.3      25 May 2021 07:20  Phos  3.6     05-25  Mg     1.9     05-25

## 2021-05-26 LAB
ANION GAP SERPL CALC-SCNC: 14 MMOL/L — SIGNIFICANT CHANGE UP (ref 7–14)
BUN SERPL-MCNC: 44 MG/DL — HIGH (ref 7–23)
CALCIUM SERPL-MCNC: 10.4 MG/DL — SIGNIFICANT CHANGE UP (ref 8.4–10.5)
CHLORIDE SERPL-SCNC: 101 MMOL/L — SIGNIFICANT CHANGE UP (ref 98–107)
CO2 SERPL-SCNC: 22 MMOL/L — SIGNIFICANT CHANGE UP (ref 22–31)
CREAT SERPL-MCNC: 1.32 MG/DL — HIGH (ref 0.5–1.3)
GLUCOSE BLDC GLUCOMTR-MCNC: 109 MG/DL — HIGH (ref 70–99)
GLUCOSE BLDC GLUCOMTR-MCNC: 109 MG/DL — HIGH (ref 70–99)
GLUCOSE BLDC GLUCOMTR-MCNC: 122 MG/DL — HIGH (ref 70–99)
GLUCOSE BLDC GLUCOMTR-MCNC: 151 MG/DL — HIGH (ref 70–99)
GLUCOSE SERPL-MCNC: 116 MG/DL — HIGH (ref 70–99)
HCT VFR BLD CALC: 45.3 % — HIGH (ref 34.5–45)
HGB BLD-MCNC: 14.4 G/DL — SIGNIFICANT CHANGE UP (ref 11.5–15.5)
MAGNESIUM SERPL-MCNC: 1.9 MG/DL — SIGNIFICANT CHANGE UP (ref 1.6–2.6)
MCHC RBC-ENTMCNC: 27.9 PG — SIGNIFICANT CHANGE UP (ref 27–34)
MCHC RBC-ENTMCNC: 31.8 GM/DL — LOW (ref 32–36)
MCV RBC AUTO: 87.8 FL — SIGNIFICANT CHANGE UP (ref 80–100)
NRBC # BLD: 0 /100 WBCS — SIGNIFICANT CHANGE UP
NRBC # FLD: 0 K/UL — SIGNIFICANT CHANGE UP
PHOSPHATE SERPL-MCNC: 3.4 MG/DL — SIGNIFICANT CHANGE UP (ref 2.5–4.5)
PLATELET # BLD AUTO: 256 K/UL — SIGNIFICANT CHANGE UP (ref 150–400)
POTASSIUM SERPL-MCNC: 3.3 MMOL/L — LOW (ref 3.5–5.3)
POTASSIUM SERPL-SCNC: 3.3 MMOL/L — LOW (ref 3.5–5.3)
RBC # BLD: 5.16 M/UL — SIGNIFICANT CHANGE UP (ref 3.8–5.2)
RBC # FLD: 14.8 % — HIGH (ref 10.3–14.5)
SARS-COV-2 RNA SPEC QL NAA+PROBE: SIGNIFICANT CHANGE UP
SODIUM SERPL-SCNC: 137 MMOL/L — SIGNIFICANT CHANGE UP (ref 135–145)
WBC # BLD: 5.13 K/UL — SIGNIFICANT CHANGE UP (ref 3.8–10.5)
WBC # FLD AUTO: 5.13 K/UL — SIGNIFICANT CHANGE UP (ref 3.8–10.5)

## 2021-05-26 PROCEDURE — 99232 SBSQ HOSP IP/OBS MODERATE 35: CPT

## 2021-05-26 RX ORDER — SODIUM CHLORIDE 9 MG/ML
1000 INJECTION INTRAMUSCULAR; INTRAVENOUS; SUBCUTANEOUS
Refills: 0 | Status: DISCONTINUED | OUTPATIENT
Start: 2021-05-26 | End: 2021-05-31

## 2021-05-26 RX ORDER — POTASSIUM CHLORIDE 20 MEQ
40 PACKET (EA) ORAL ONCE
Refills: 0 | Status: COMPLETED | OUTPATIENT
Start: 2021-05-26 | End: 2021-05-26

## 2021-05-26 RX ADMIN — NYSTATIN CREAM 1 APPLICATION(S): 100000 CREAM TOPICAL at 05:49

## 2021-05-26 RX ADMIN — SODIUM CHLORIDE 75 MILLILITER(S): 9 INJECTION INTRAMUSCULAR; INTRAVENOUS; SUBCUTANEOUS at 09:46

## 2021-05-26 RX ADMIN — Medication 1 SPRAY(S): at 18:19

## 2021-05-26 RX ADMIN — SODIUM CHLORIDE 3 MILLILITER(S): 9 INJECTION INTRAMUSCULAR; INTRAVENOUS; SUBCUTANEOUS at 21:09

## 2021-05-26 RX ADMIN — SODIUM CHLORIDE 75 MILLILITER(S): 9 INJECTION INTRAMUSCULAR; INTRAVENOUS; SUBCUTANEOUS at 21:35

## 2021-05-26 RX ADMIN — Medication 1 SPRAY(S): at 05:49

## 2021-05-26 RX ADMIN — Medication 0.1 MILLIGRAM(S): at 21:12

## 2021-05-26 RX ADMIN — Medication 40 MILLIEQUIVALENT(S): at 09:46

## 2021-05-26 RX ADMIN — NYSTATIN CREAM 1 APPLICATION(S): 100000 CREAM TOPICAL at 18:10

## 2021-05-26 RX ADMIN — ENOXAPARIN SODIUM 40 MILLIGRAM(S): 100 INJECTION SUBCUTANEOUS at 05:48

## 2021-05-26 RX ADMIN — SODIUM CHLORIDE 3 MILLILITER(S): 9 INJECTION INTRAMUSCULAR; INTRAVENOUS; SUBCUTANEOUS at 07:27

## 2021-05-26 RX ADMIN — POLYETHYLENE GLYCOL 3350 17 GRAM(S): 17 POWDER, FOR SOLUTION ORAL at 11:33

## 2021-05-26 RX ADMIN — Medication 81 MILLIGRAM(S): at 11:32

## 2021-05-26 RX ADMIN — Medication 50 MILLIGRAM(S): at 18:19

## 2021-05-26 RX ADMIN — Medication 90 MILLIGRAM(S): at 05:47

## 2021-05-26 RX ADMIN — SODIUM CHLORIDE 3 MILLILITER(S): 9 INJECTION INTRAMUSCULAR; INTRAVENOUS; SUBCUTANEOUS at 14:30

## 2021-05-26 RX ADMIN — ATORVASTATIN CALCIUM 40 MILLIGRAM(S): 80 TABLET, FILM COATED ORAL at 21:12

## 2021-05-26 NOTE — PROGRESS NOTE ADULT - SUBJECTIVE AND OBJECTIVE BOX
[facility] Division of Hospital Medicine  Stacie Machado MD  Pager [phone]    Patient is a 60y old  Female who presents with a chief complaint of Pt had fallen after sudden weakness in left leg walking up steps.       SUBJECTIVE / OVERNIGHT EVENTS: able to bend knee today      MEDICATIONS  (STANDING):  aspirin enteric coated 81 milliGRAM(s) Oral daily  atorvastatin 40 milliGRAM(s) Oral at bedtime  cloNIDine 0.1 milliGRAM(s) Oral at bedtime  dextrose 40% Gel 15 Gram(s) Oral once  dextrose 5%. 1000 milliLiter(s) (50 mL/Hr) IV Continuous <Continuous>  dextrose 5%. 1000 milliLiter(s) (100 mL/Hr) IV Continuous <Continuous>  dextrose 50% Injectable 25 Gram(s) IV Push once  dextrose 50% Injectable 12.5 Gram(s) IV Push once  dextrose 50% Injectable 25 Gram(s) IV Push once  fluticasone propionate 50 MICROgram(s)/spray Nasal Spray 1 Spray(s) Both Nostrils two times a day  glucagon  Injectable 1 milliGRAM(s) IntraMuscular once  hydrochlorothiazide 50 milliGRAM(s) Oral <User Schedule>  insulin lispro (ADMELOG) corrective regimen sliding scale   SubCutaneous three times a day before meals  insulin lispro (ADMELOG) corrective regimen sliding scale   SubCutaneous at bedtime  NIFEdipine XL 90 milliGRAM(s) Oral daily  nystatin Powder 1 Application(s) Topical two times a day  polyethylene glycol 3350 17 Gram(s) Oral daily  sodium chloride 0.9% lock flush 3 milliLiter(s) IV Push every 8 hours  sodium chloride 0.9%. 1000 milliLiter(s) (75 mL/Hr) IV Continuous <Continuous>    MEDICATIONS  (PRN):  acetaminophen   Tablet .. 650 milliGRAM(s) Oral every 6 hours PRN Mild Pain (1 - 3), Moderate Pain (4 - 6)      CAPILLARY BLOOD GLUCOSE  POCT Blood Glucose.: 109 mg/dL (26 May 2021 08:44)  POCT Blood Glucose.: 138 mg/dL (25 May 2021 21:03)  POCT Blood Glucose.: 102 mg/dL (25 May 2021 17:26)  POCT Blood Glucose.: 108 mg/dL (25 May 2021 12:55)        PHYSICAL EXAM:  Vital Signs Last 24 Hrs  T(F): 98.2 (26 May 2021 05:44), Max: 98.2 (25 May 2021 18:08)  HR: 62 (26 May 2021 05:44) (62 - 87)  BP: 150/84 (26 May 2021 05:44) (144/90 - 196/72)  RR: 16 (26 May 2021 05:44) (16 - 18)  SpO2: 98% (26 May 2021 05:44) (96% - 98%)    CONSTITUTIONAL: NAD, morbidly obese  RESPIRATORY: Normal respiratory effort; lungs are clear to auscultation ant/lat  CARDIOVASCULAR: Regular rate and rhythm; No lower extremity edema;   ABDOMEN: Nontender to palpation, normoactive bowel sounds, obese, soft  MUSCULOSKELETAL: no clubbing or cyanosis of digits;   PSYCH: affect appropriate  NEUROLOGY:  no gross sensory deficits       LABS:                        14.4   5.13  )-----------( 256      ( 26 May 2021 07:07 )             45.3     05-26    137  |  101  |  44<H>  ----------------------------<  116<H>  3.3<L>   |  22  |  1.32<H>    Ca    10.4      26 May 2021 07:07  Phos  3.4     05-26  Mg     1.9     05-26

## 2021-05-26 NOTE — PROGRESS NOTE ADULT - SUBJECTIVE AND OBJECTIVE BOX
Neurology Progress Note    S: Patient seen and examined. No new events overnight. patient denied CP, SOB, HA or pain. PMR rec AR.   CT L spine obtained with LUPILLOD. awaiting further imaging CT myelogram       Medication:  MEDICATIONS  (STANDING):  MEDICATIONS  (STANDING):  aspirin enteric coated 81 milliGRAM(s) Oral daily  atorvastatin 40 milliGRAM(s) Oral at bedtime  cloNIDine 0.1 milliGRAM(s) Oral at bedtime  dextrose 40% Gel 15 Gram(s) Oral once  dextrose 5%. 1000 milliLiter(s) (50 mL/Hr) IV Continuous <Continuous>  dextrose 5%. 1000 milliLiter(s) (100 mL/Hr) IV Continuous <Continuous>  dextrose 50% Injectable 25 Gram(s) IV Push once  dextrose 50% Injectable 12.5 Gram(s) IV Push once  dextrose 50% Injectable 25 Gram(s) IV Push once  fluticasone propionate 50 MICROgram(s)/spray Nasal Spray 1 Spray(s) Both Nostrils two times a day  glucagon  Injectable 1 milliGRAM(s) IntraMuscular once  hydrochlorothiazide 50 milliGRAM(s) Oral <User Schedule>  insulin lispro (ADMELOG) corrective regimen sliding scale   SubCutaneous three times a day before meals  insulin lispro (ADMELOG) corrective regimen sliding scale   SubCutaneous at bedtime  NIFEdipine XL 90 milliGRAM(s) Oral daily  nystatin Powder 1 Application(s) Topical two times a day  polyethylene glycol 3350 17 Gram(s) Oral daily  potassium chloride    Tablet ER 40 milliEquivalent(s) Oral once  sodium chloride 0.9% lock flush 3 milliLiter(s) IV Push every 8 hours  sodium chloride 0.9%. 1000 milliLiter(s) (75 mL/Hr) IV Continuous <Continuous>    MEDICATIONS  (PRN):  acetaminophen   Tablet .. 650 milliGRAM(s) Oral every 6 hours PRN Mild Pain (1 - 3), Moderate Pain (4 - 6)      Vitals:  Vital Signs Last 24 Hrs  T(C): 36.8 (26 May 2021 05:44), Max: 36.8 (25 May 2021 18:08)  T(F): 98.2 (26 May 2021 05:44), Max: 98.2 (25 May 2021 18:08)  HR: 62 (26 May 2021 05:44) (62 - 87)  BP: 150/84 (26 May 2021 05:44) (144/90 - 196/72)  BP(mean): --  RR: 16 (26 May 2021 05:44) (16 - 18)  SpO2: 98% (26 May 2021 05:44) (96% - 99%)    General:  Constitutional: Obese Female, appears stated age, in no apparent distress including pain  Head: Normocephalic & atraumatic.    Neurological (>12):  MS: Awake, alert, oriented to person, place, situation, time. Normal affect. Follows all commands.    Language: Speech is clear, fluent with good repetition & comprehension.    CNs: PERRLA (R = 3mm, L = 3mm). VF intact in all 4 quadrants, EOMI no nystagmus. Some reduced sensation to pinprick on the L side of the face, intact to LT throughout, well developed masseter muscles b/l. No facial asymmetry b/l, full eye closure strength b/l. Symmetric palate elevation in midline. Shoulder shrug intact b/l. Tongue midline, normal movements, no atrophy.    Motor: Normal muscle bulk & tone RUE. . No noticeable tremor or seizure. Noted L arm drift- improved. LLE 2-3, now 1-2/5. RLE 3-4/5    Sensation: Reduced sensation to pinprick in the distal L leg and somewhat on the lateral thigh. Reduced sensation to pinprick on the distal L arm. Intact to vibratory sense and proprioception throughout.    Reflexes:              Biceps(C5)       BR(C6)     Triceps(C7)               Patellar(L4)    Achilles(S1)    Plantar Resp  R	2	          2	             2		        0		    1		Mute   L	2	          2	             2		        0		    1		Mute     Coordination: No dysmetria to FTN    Gait: Deferred    I personally reviewed the below data/images/labs:    CBC Full  -  ( 26 May 2021 07:07 )  WBC Count : 5.13 K/uL  RBC Count : 5.16 M/uL  Hemoglobin : 14.4 g/dL  Hematocrit : 45.3 %  Platelet Count - Automated : 256 K/uL  Mean Cell Volume : 87.8 fL  Mean Cell Hemoglobin : 27.9 pg  Mean Cell Hemoglobin Concentration : 31.8 gm/dL  Auto Neutrophil # : x  Auto Lymphocyte # : x  Auto Monocyte # : x  Auto Eosinophil # : x  Auto Basophil # : x  Auto Neutrophil % : x  Auto Lymphocyte % : x  Auto Monocyte % : x  Auto Eosinophil % : x  Auto Basophil % : x    -    137  |  101  |  44<H>  ----------------------------<  116<H>  3.3<L>   |  22  |  1.32<H>    Ca    10.4      26 May 2021 07:07  Phos  3.4       Mg     1.9               PTT - ( 16 May 2021 18:56 )  PTT:37.4 sec  Urinalysis Basic - ( 17 May 2021 06:51 )    Color: Light Yellow / Appearance: Clear / S.037 / pH: x  Gluc: x / Ketone: Small  / Bili: Negative / Urobili: <2 mg/dL   Blood: x / Protein: Trace / Nitrite: Negative   Leuk Esterase: Negative / RBC: 0 /HPF / WBC 1 /HPF   Sq Epi: x / Non Sq Epi: Occasional / Bacteria: Few      < from: CT Hip No Cont, Left (21 @ 23:06) >    EXAM:  CT HIP ONLY LT        PROCEDURE DATE:  May 16 2021         INTERPRETATION:  HISTORY: Left-sided hip pain.    Helical CT imaging of the left hip was performed without intravenous contrast. Sagittal and coronal reformats were provided. 3-D reformats were performed on a separate workstation.    Correlation is made to prior radiographs from a 2021.    FINDINGS:    There is no evidence of acute fracture or dislocation. There is severe left hip arthrosis with prominent subchondral cystic change along the posterior margin of the articulation. There is mild bilateral sacral iliac joint arthrosis.    There is no subcutaneous or intramuscular hematoma. There is a calcified uterine fibroid. There is colonic diverticulosis.    IMPRESSION:    Severe left hip arthrosis.    No evidence of acute fracture or dislocation.              ANNE VIDALES MD; Attending Radiologist  This document has been electronically signed. May 17 2021  8:50AM    < end of copied text >  < from: CT Angio Head w/ IV Cont (21 @ 23:06) >    EXAM:  CT ANGIO BRAIN (W)AW IC      EXAM:  CT ANGIO NECK (W)AW IC        PROCEDURE DATE:  May 16 2021         INTERPRETATION:  HISTORY: Left lower extremity weakness and slurred speech, for 2-3 days    COMPARISON: No similar prior    TECHNIQUE: Noncontrast CT head and CT angiogram of the neck and brain was performed after administration of intravenous contrast. MIP and 3D reconstructions were performed.    Total of 90 cc Omnipaque 350 intravenous contrast were administered without complication.10 cc discarded.    FINDINGS:    CT HEAD:    No acute intracranial hemorrhage, or CT evidence of acute, large territorial infarct. There is an age-indeterminate lacunar infarct in the left thalamus.. No hydrocephalus. Basilar cisterns are clear. Scalp and imaged midfacial soft tissues are unremarkable. Calvarium is intact.    CTA BRAIN  This study is degraded by venous contamination.    INTERNAL CAROTID ARTERIES:  The intracranial segments of the ICA are patent without hemodynamically significantstenosis, occlusion, or aneurysm. The ICA bifurcations are unremarkable.    ANTERIOR CEREBRAL ARTERIES: No proximal flow-limiting stenosis or occlusion. Anterior communicating artery is unremarkable without aneurysm.    MIDDLE CEREBRAL ARTERIES: No proximal flow-limiting stenosis or occlusion. MCA bifurcations are unremarkable without aneurysm.    POSTERIOR CEREBRAL ARTERIES: No proximal flow-limiting stenosis or occlusion. Posterior communicating artery is well-developed on the right.    VERTEBROBASILAR SYSTEM: The right vertebral artery predominantly terminates in the PICA. The basilar flow is predominantly supplied by the left vertebral artery. The distal left V4 segment demonstrates short segment of attenuated flow, estimated at 50%. The basilar tip is unremarkable    CTA NECK  This study is degraded by combination of streak artifact from the patient's body habitus and shoulder positioning as well and motion.    RIGHT CAROTID SYSTEM: Normal in course and caliber without flow-limiting stenosis or occlusion.    LEFT CAROTID SYSTEM: Normal in course and caliber without flow-limiting stenosis or occlusion.    VERTEBRAL SYSTEM: Very limited evaluation of the origin and majority of the cervical portions of the bilateral vertebral arteries due to the above limitations. The arteries are grossly patent.    AORTIC ARCH: Typical three-vessel arch..    IMPRESSION:    CT HEAD: Age-indeterminate lacunar infarct in the left thalamus. No acute intracranial hemorrhage.    CTA BRAIN: Degraded exam. No evidence of proximal vessel occlusion or flow-limiting stenosis.    CTA NECK: Degraded exam, with essentially nondiagnostic evaluation of the vertebral arteries. No occlusion or flow-limiting stenosis in the carotid arteries.            SINDI ESQUEDA MD, Resident Radiology  This document has been electronically signed.  ASUNCION GARRIDO MD; Attending Radiologist  This document has been electronically signed. May 16 2021 11:51PM    < end of copied text >  < from: MR Head No Cont (21 @ 13:31) >    EXAM:  MR BRAIN        PROCEDURE DATE:  May 18 2021         INTERPRETATION:  CLINICAL STATEMENT: Left-sided weakness    TECHNIQUE: MRI of the brain was performed without gadolinium.    COMPARISON: CT head 2021    FINDINGS:  Incomplete exam dueto technical failure. Only the sagittal and axial T1 sequences obtained.    No midline shift. No gross mass effect. No hydrocephalus. Probable chronic lacunar infarct left thalamus    Nonspecific diffuse low T1 bone marrow signal noted which could berelated to red marrow hyperplasia in the correct clinical setting.    IMPRESSION:  Incomplete exam. Repeat exam recommended.        TATIANA CHESTER MD; Attending Radiologist  This document has been electronically signed. May 18 2021  2:12PM    < end of copied text >  < from: CT Lumbar Spine w/ IV Cont (21 @ 11:44) >    EXAM:  CT LUMBAR SPINE IC        PROCEDURE DATE:  May 20 2021         INTERPRETATION:  Clinical indication: Lower extremity weakness    Multiple axial sections were performed through the lumbar spine. Coronal and sagittal reconstructions were performed as well.    This exam is somewhat limited by motion.    Scoliosis is seen.    Loss of the normal lumbar lordosis is seen.    Schmorl's nodes are seen multiple levels    Disc space narrowing endplate sclerotic change and osteophytes are seen at multiple levels. This is most prominent at the L5-S1 level and secondary to chronic degenerative changes.    Vacuum disc changes seen involving the L3-4 level which is secondary to chronic degenerative change.    T11-12: Normal    T12-L1: Normal    L1-2: Normal    L2-3: Disc bulge and bilateral hypertrophic facet changes are seen. Moderate to severe narrowing of the spinal canal is seen.    L3-4: Disc bulge and bilateral hypertrophic facet joint changes are seen. Moderate to severe narrowing spinal canal is seen. Mild to moderate narrowing of the left neural foramen    L4-5: Disc bulge and bilateral hypertrophic facet changes. Moderate narrowing of the spinal canal. Severe narrowing of the left neural foramen.    L5-S1: Disc bulge and bilateral hypertrophic facet joint changes. Moderate to severe narrowing of the left neural foramen.    Evaluation of the paraspinal soft tissues is limited by lack of IV contrast though grossly normal    Calcified fibroid is seen involving the pelvic region.    There is evidence of an IUD seen in the uterus.    IMPRESSION: Degenerative changes as described above.    MRI can be done for further evaluation if there are no contraindications.              HEVER GOODRICH M.D., ATTENDING RADIOLOGIST  This document has been electronically signed. May 20 2021 11:58AM    < end of copied text >

## 2021-05-26 NOTE — PROVIDER CONTACT NOTE (OTHER) - ACTION/TREATMENT ORDERED:
Bp rechecked and is lower. patient stated that she was repositioning in bed and was having acute pain when bp was taken. Nighttime standing medications administered. Will continue to monitor

## 2021-05-27 LAB
ANION GAP SERPL CALC-SCNC: 14 MMOL/L — SIGNIFICANT CHANGE UP (ref 7–14)
BUN SERPL-MCNC: 42 MG/DL — HIGH (ref 7–23)
CALCIUM SERPL-MCNC: 10 MG/DL — SIGNIFICANT CHANGE UP (ref 8.4–10.5)
CHLORIDE SERPL-SCNC: 104 MMOL/L — SIGNIFICANT CHANGE UP (ref 98–107)
CO2 SERPL-SCNC: 20 MMOL/L — LOW (ref 22–31)
CREAT SERPL-MCNC: 1.4 MG/DL — HIGH (ref 0.5–1.3)
GLUCOSE BLDC GLUCOMTR-MCNC: 107 MG/DL — HIGH (ref 70–99)
GLUCOSE BLDC GLUCOMTR-MCNC: 115 MG/DL — HIGH (ref 70–99)
GLUCOSE BLDC GLUCOMTR-MCNC: 121 MG/DL — HIGH (ref 70–99)
GLUCOSE BLDC GLUCOMTR-MCNC: 138 MG/DL — HIGH (ref 70–99)
GLUCOSE SERPL-MCNC: 128 MG/DL — HIGH (ref 70–99)
HCT VFR BLD CALC: 44.4 % — SIGNIFICANT CHANGE UP (ref 34.5–45)
HGB BLD-MCNC: 14.3 G/DL — SIGNIFICANT CHANGE UP (ref 11.5–15.5)
MAGNESIUM SERPL-MCNC: 1.9 MG/DL — SIGNIFICANT CHANGE UP (ref 1.6–2.6)
MCHC RBC-ENTMCNC: 28.4 PG — SIGNIFICANT CHANGE UP (ref 27–34)
MCHC RBC-ENTMCNC: 32.2 GM/DL — SIGNIFICANT CHANGE UP (ref 32–36)
MCV RBC AUTO: 88.1 FL — SIGNIFICANT CHANGE UP (ref 80–100)
NRBC # BLD: 0 /100 WBCS — SIGNIFICANT CHANGE UP
NRBC # FLD: 0 K/UL — SIGNIFICANT CHANGE UP
PHOSPHATE SERPL-MCNC: 3.2 MG/DL — SIGNIFICANT CHANGE UP (ref 2.5–4.5)
PLATELET # BLD AUTO: 260 K/UL — SIGNIFICANT CHANGE UP (ref 150–400)
POTASSIUM SERPL-MCNC: 3.4 MMOL/L — LOW (ref 3.5–5.3)
POTASSIUM SERPL-SCNC: 3.4 MMOL/L — LOW (ref 3.5–5.3)
RBC # BLD: 5.04 M/UL — SIGNIFICANT CHANGE UP (ref 3.8–5.2)
RBC # FLD: 15.1 % — HIGH (ref 10.3–14.5)
SODIUM SERPL-SCNC: 138 MMOL/L — SIGNIFICANT CHANGE UP (ref 135–145)
WBC # BLD: 5.91 K/UL — SIGNIFICANT CHANGE UP (ref 3.8–10.5)
WBC # FLD AUTO: 5.91 K/UL — SIGNIFICANT CHANGE UP (ref 3.8–10.5)

## 2021-05-27 PROCEDURE — 77003 FLUOROGUIDE FOR SPINE INJECT: CPT | Mod: 26,GC

## 2021-05-27 PROCEDURE — 72132 CT LUMBAR SPINE W/DYE: CPT | Mod: 26

## 2021-05-27 PROCEDURE — 99232 SBSQ HOSP IP/OBS MODERATE 35: CPT

## 2021-05-27 PROCEDURE — 72131 CT LUMBAR SPINE W/O DYE: CPT | Mod: 26,59

## 2021-05-27 PROCEDURE — 62284 INJECTION FOR MYELOGRAM: CPT

## 2021-05-27 RX ORDER — POTASSIUM CHLORIDE 20 MEQ
40 PACKET (EA) ORAL ONCE
Refills: 0 | Status: COMPLETED | OUTPATIENT
Start: 2021-05-27 | End: 2021-05-27

## 2021-05-27 RX ORDER — ENOXAPARIN SODIUM 100 MG/ML
40 INJECTION SUBCUTANEOUS
Refills: 0 | Status: DISCONTINUED | OUTPATIENT
Start: 2021-05-28 | End: 2021-06-01

## 2021-05-27 RX ORDER — ENOXAPARIN SODIUM 100 MG/ML
40 INJECTION SUBCUTANEOUS EVERY 12 HOURS
Refills: 0 | Status: DISCONTINUED | OUTPATIENT
Start: 2021-05-27 | End: 2021-05-27

## 2021-05-27 RX ORDER — SODIUM CHLORIDE 9 MG/ML
1000 INJECTION, SOLUTION INTRAVENOUS
Refills: 0 | Status: DISCONTINUED | OUTPATIENT
Start: 2021-05-27 | End: 2021-05-31

## 2021-05-27 RX ADMIN — Medication 0.1 MILLIGRAM(S): at 21:20

## 2021-05-27 RX ADMIN — SODIUM CHLORIDE 75 MILLILITER(S): 9 INJECTION, SOLUTION INTRAVENOUS at 09:15

## 2021-05-27 RX ADMIN — SODIUM CHLORIDE 3 MILLILITER(S): 9 INJECTION INTRAMUSCULAR; INTRAVENOUS; SUBCUTANEOUS at 14:20

## 2021-05-27 RX ADMIN — POLYETHYLENE GLYCOL 3350 17 GRAM(S): 17 POWDER, FOR SOLUTION ORAL at 11:46

## 2021-05-27 RX ADMIN — Medication 81 MILLIGRAM(S): at 11:45

## 2021-05-27 RX ADMIN — SODIUM CHLORIDE 3 MILLILITER(S): 9 INJECTION INTRAMUSCULAR; INTRAVENOUS; SUBCUTANEOUS at 21:14

## 2021-05-27 RX ADMIN — Medication 50 MILLIGRAM(S): at 19:00

## 2021-05-27 RX ADMIN — Medication 1 SPRAY(S): at 19:00

## 2021-05-27 RX ADMIN — SODIUM CHLORIDE 3 MILLILITER(S): 9 INJECTION INTRAMUSCULAR; INTRAVENOUS; SUBCUTANEOUS at 06:10

## 2021-05-27 RX ADMIN — Medication 40 MILLIEQUIVALENT(S): at 09:14

## 2021-05-27 RX ADMIN — NYSTATIN CREAM 1 APPLICATION(S): 100000 CREAM TOPICAL at 19:00

## 2021-05-27 RX ADMIN — Medication 1 SPRAY(S): at 05:44

## 2021-05-27 RX ADMIN — Medication 90 MILLIGRAM(S): at 05:44

## 2021-05-27 RX ADMIN — NYSTATIN CREAM 1 APPLICATION(S): 100000 CREAM TOPICAL at 05:44

## 2021-05-27 RX ADMIN — ATORVASTATIN CALCIUM 40 MILLIGRAM(S): 80 TABLET, FILM COATED ORAL at 21:20

## 2021-05-27 NOTE — PROGRESS NOTE ADULT - SUBJECTIVE AND OBJECTIVE BOX
[facility] Division of Hospital Medicine  Stacie Machado MD  Pager [phone]    Patient is a 60y old  Female who presents with a chief complaint of Pt had fallen after sudden weakness in left leg walking up steps.      SUBJECTIVE / OVERNIGHT EVENTS: seems to be regaining some movement in LLE; plan for CT myleogram today      MEDICATIONS  (STANDING):  aspirin enteric coated 81 milliGRAM(s) Oral daily  atorvastatin 40 milliGRAM(s) Oral at bedtime  cloNIDine 0.1 milliGRAM(s) Oral at bedtime  dextrose 40% Gel 15 Gram(s) Oral once  dextrose 5%. 1000 milliLiter(s) (50 mL/Hr) IV Continuous <Continuous>  dextrose 5%. 1000 milliLiter(s) (100 mL/Hr) IV Continuous <Continuous>  dextrose 50% Injectable 25 Gram(s) IV Push once  dextrose 50% Injectable 12.5 Gram(s) IV Push once  dextrose 50% Injectable 25 Gram(s) IV Push once  fluticasone propionate 50 MICROgram(s)/spray Nasal Spray 1 Spray(s) Both Nostrils two times a day  glucagon  Injectable 1 milliGRAM(s) IntraMuscular once  hydrochlorothiazide 50 milliGRAM(s) Oral <User Schedule>  insulin lispro (ADMELOG) corrective regimen sliding scale   SubCutaneous three times a day before meals  insulin lispro (ADMELOG) corrective regimen sliding scale   SubCutaneous at bedtime  NIFEdipine XL 90 milliGRAM(s) Oral daily  nystatin Powder 1 Application(s) Topical two times a day  polyethylene glycol 3350 17 Gram(s) Oral daily  sodium chloride 0.45%. 1000 milliLiter(s) (75 mL/Hr) IV Continuous <Continuous>  sodium chloride 0.9% lock flush 3 milliLiter(s) IV Push every 8 hours  sodium chloride 0.9%. 1000 milliLiter(s) (75 mL/Hr) IV Continuous <Continuous>    MEDICATIONS  (PRN):  acetaminophen   Tablet .. 650 milliGRAM(s) Oral every 6 hours PRN Mild Pain (1 - 3), Moderate Pain (4 - 6)      CAPILLARY BLOOD GLUCOSE  POCT Blood Glucose.: 121 mg/dL (27 May 2021 09:10)  POCT Blood Glucose.: 151 mg/dL (26 May 2021 21:20)  POCT Blood Glucose.: 109 mg/dL (26 May 2021 18:13)  POCT Blood Glucose.: 122 mg/dL (26 May 2021 12:18)      PHYSICAL EXAM:  Vital Signs Last 24 Hrs  T(F): 97.8 (27 May 2021 05:00), Max: 98.4 (26 May 2021 15:24)  HR: 69 (27 May 2021 05:00) (69 - 78)  BP: 153/83 (27 May 2021 05:00) (143/72 - 177/90)  RR: 17 (27 May 2021 05:00) (16 - 18)  SpO2: 96% (27 May 2021 05:00) (96% - 99%)    CONSTITUTIONAL: NAD, morbidly obese  RESPIRATORY: Normal respiratory effort; lungs are clear to auscultation ant  CARDIOVASCULAR: Regular rate and rhythm; No lower extremity edema;   ABDOMEN: Nontender to palpation, normoactive bowel sounds, soft, obese  MUSCULOSKELETAL:  no joint swelling or tenderness to palpation  PSYCH: A+O to person, place, and time; affect appropriate  NEUROLOGY: seems to be able to move LLE at hip, knee, ankle joints; still cannot wiggle toes    LABS:                        14.3   5.91  )-----------( 260      ( 27 May 2021 03:58 )             44.4     05-27    138  |  104  |  42<H>  ----------------------------<  128<H>  3.4<L>   |  20<L>  |  1.40<H>    Ca    10.0      27 May 2021 03:58  Phos  3.2     05-27  Mg     1.9     05-27

## 2021-05-28 ENCOUNTER — TRANSCRIPTION ENCOUNTER (OUTPATIENT)
Age: 61
End: 2021-05-28

## 2021-05-28 LAB
ANION GAP SERPL CALC-SCNC: 13 MMOL/L — SIGNIFICANT CHANGE UP (ref 7–14)
BUN SERPL-MCNC: 37 MG/DL — HIGH (ref 7–23)
CALCIUM SERPL-MCNC: 10.3 MG/DL — SIGNIFICANT CHANGE UP (ref 8.4–10.5)
CHLORIDE SERPL-SCNC: 104 MMOL/L — SIGNIFICANT CHANGE UP (ref 98–107)
CO2 SERPL-SCNC: 20 MMOL/L — LOW (ref 22–31)
CREAT SERPL-MCNC: 1.27 MG/DL — SIGNIFICANT CHANGE UP (ref 0.5–1.3)
GLUCOSE BLDC GLUCOMTR-MCNC: 107 MG/DL — HIGH (ref 70–99)
GLUCOSE BLDC GLUCOMTR-MCNC: 129 MG/DL — HIGH (ref 70–99)
GLUCOSE BLDC GLUCOMTR-MCNC: 172 MG/DL — HIGH (ref 70–99)
GLUCOSE BLDC GLUCOMTR-MCNC: 99 MG/DL — SIGNIFICANT CHANGE UP (ref 70–99)
GLUCOSE SERPL-MCNC: 116 MG/DL — HIGH (ref 70–99)
HCT VFR BLD CALC: 43.7 % — SIGNIFICANT CHANGE UP (ref 34.5–45)
HGB BLD-MCNC: 13.9 G/DL — SIGNIFICANT CHANGE UP (ref 11.5–15.5)
MAGNESIUM SERPL-MCNC: 1.8 MG/DL — SIGNIFICANT CHANGE UP (ref 1.6–2.6)
MCHC RBC-ENTMCNC: 27.9 PG — SIGNIFICANT CHANGE UP (ref 27–34)
MCHC RBC-ENTMCNC: 31.8 GM/DL — LOW (ref 32–36)
MCV RBC AUTO: 87.8 FL — SIGNIFICANT CHANGE UP (ref 80–100)
NRBC # BLD: 0 /100 WBCS — SIGNIFICANT CHANGE UP
NRBC # FLD: 0 K/UL — SIGNIFICANT CHANGE UP
PHOSPHATE SERPL-MCNC: 3.1 MG/DL — SIGNIFICANT CHANGE UP (ref 2.5–4.5)
PLATELET # BLD AUTO: 247 K/UL — SIGNIFICANT CHANGE UP (ref 150–400)
POTASSIUM SERPL-MCNC: 3.7 MMOL/L — SIGNIFICANT CHANGE UP (ref 3.5–5.3)
POTASSIUM SERPL-SCNC: 3.7 MMOL/L — SIGNIFICANT CHANGE UP (ref 3.5–5.3)
RBC # BLD: 4.98 M/UL — SIGNIFICANT CHANGE UP (ref 3.8–5.2)
RBC # FLD: 15 % — HIGH (ref 10.3–14.5)
SODIUM SERPL-SCNC: 137 MMOL/L — SIGNIFICANT CHANGE UP (ref 135–145)
WBC # BLD: 5.86 K/UL — SIGNIFICANT CHANGE UP (ref 3.8–10.5)
WBC # FLD AUTO: 5.86 K/UL — SIGNIFICANT CHANGE UP (ref 3.8–10.5)

## 2021-05-28 PROCEDURE — 99232 SBSQ HOSP IP/OBS MODERATE 35: CPT

## 2021-05-28 RX ADMIN — SODIUM CHLORIDE 3 MILLILITER(S): 9 INJECTION INTRAMUSCULAR; INTRAVENOUS; SUBCUTANEOUS at 13:10

## 2021-05-28 RX ADMIN — Medication 90 MILLIGRAM(S): at 06:50

## 2021-05-28 RX ADMIN — ENOXAPARIN SODIUM 40 MILLIGRAM(S): 100 INJECTION SUBCUTANEOUS at 18:06

## 2021-05-28 RX ADMIN — Medication 50 MILLIGRAM(S): at 18:06

## 2021-05-28 RX ADMIN — SODIUM CHLORIDE 3 MILLILITER(S): 9 INJECTION INTRAMUSCULAR; INTRAVENOUS; SUBCUTANEOUS at 22:18

## 2021-05-28 RX ADMIN — ENOXAPARIN SODIUM 40 MILLIGRAM(S): 100 INJECTION SUBCUTANEOUS at 06:51

## 2021-05-28 RX ADMIN — Medication 0.1 MILLIGRAM(S): at 22:43

## 2021-05-28 RX ADMIN — SODIUM CHLORIDE 3 MILLILITER(S): 9 INJECTION INTRAMUSCULAR; INTRAVENOUS; SUBCUTANEOUS at 06:22

## 2021-05-28 RX ADMIN — Medication 1 SPRAY(S): at 06:51

## 2021-05-28 RX ADMIN — ATORVASTATIN CALCIUM 40 MILLIGRAM(S): 80 TABLET, FILM COATED ORAL at 22:43

## 2021-05-28 RX ADMIN — NYSTATIN CREAM 1 APPLICATION(S): 100000 CREAM TOPICAL at 06:51

## 2021-05-28 RX ADMIN — Medication 1 SPRAY(S): at 18:07

## 2021-05-28 RX ADMIN — Medication 81 MILLIGRAM(S): at 12:42

## 2021-05-28 RX ADMIN — NYSTATIN CREAM 1 APPLICATION(S): 100000 CREAM TOPICAL at 18:07

## 2021-05-28 NOTE — DISCHARGE NOTE PROVIDER - CARE PROVIDER_API CALL
Stacie Machado)  Internal Medicine  [phone] [place] [address]  Phone: [phone]  Fax: [phone]  Follow Up Time:    Erik Foster)  Neurology; Vascular Neurology  [address], [address]  [place] [address]  Phone: [phone]  Fax: [phone]  Follow Up Time:     Sisi Cody)  [facility] Neurosurgery  General  [address], [address]  [place] [address]  Phone: [phone]  Fax: [phone]  Follow Up Time:    Erik Foster)  Neurology; Vascular Neurology  [address] [facility], [address]  [place] [address]  Phone: [phone]  Fax: [phone]  Follow Up Time: 2 weeks    Sisi Cody)  [facility] Neurosurgery  General  [address], [address]  [place] [address]  Phone: [phone]  Fax: [phone]  Follow Up Time: 2 weeks

## 2021-05-28 NOTE — PROGRESS NOTE ADULT - SUBJECTIVE AND OBJECTIVE BOX
Patient is a 60y old  Female who presents with a chief complaint of Pt had fallen after sudden weakness in left leg walking up steps. (28 May 2021 11:57)      HPI:  Pt is a  59 yo female with PMHx of Hypertension and DM, Osteoarthritis and B/L Carpel Tunnel.  She denies CAD.  She was seen, treated and discharged from the ER at [facility] yesterday for c/o a few months of leg weakness and  left knee pain  (21) and upon walking up steps at home, on the 4th step her left leg went totally weak and she sat on the step.  She denies a true fall or hurting her hip, head or any other body part.  She denies associated dizziness, back, hip, knee or ankle pain prior to the left leg weakness. She came back to the ER for repeat evaluation.    CTH & CT angio H&N showed: CT HEAD: Age-indeterminate lacunar infarct in the left thalamus. No acute intracranial hemorrhage.  CTA BRAIN: Degraded exam. No evidence of proximal vessel occlusion or flow-limiting stenosis.  CTA NECK: Degraded exam, with essentially nondiagnostic evaluation of the vertebral arteries. No occlusion or flow-limiting stenosis in the carotid arteries.  CT Left HIP: PRELIM - No acute fracture. Follow up final report.  X-ray KNEE: There is no fracture or dislocation. No knee effusion.  The joint spaces are preserved. No abnormal soft tissue swelling or mineralization.    Admission for further workup is recommended.        (17 May 2021 02:42)    CT myelogram suspicious for underlying mass  denies pain.    REVIEW OF SYSTEMS  Constitutional - No fever, No weight loss, No fatigue  HEENT - No eye pain, No visual disturbances, No difficulty hearing, No tinnitus, No vertigo, No neck pain  Respiratory - No cough, No wheezing, No shortness of breath  Cardiovascular - No chest pain, No palpitations  Gastrointestinal - No abdominal pain, No nausea, No vomiting, No diarrhea, No constipation  Genitourinary - No dysuria, No frequency, No hematuria, No incontinence  Neurological - No headaches, No memory loss, + loss of strength, No numbness, No tremors  Skin - No itching, No rashes, No lesions   Endocrine - No temperature intolerance  Musculoskeletal - No joint pain, No joint swelling, No muscle pain  Psychiatric - No depression, No anxiety    PAST MEDICAL & SURGICAL HISTORY  Diabetes mellitus    Hypertension    Morbid obesity    Morbid obesity with BMI of 60.0-69.9, adult    Type 2 diabetes mellitus    Endometrial hyperplasia    History of  section       CURRENT FUNCTIONAL STATUS   Authored By    Authored By: physical [place] [id number]     Overall Progress Summary    Progress Summary: Patient received seated in bed in chair mode , patient performed therapeutic exercises to extremities as tolerated in all planes while supine, repositioned well in bed x 3 reps , able to move left UE well, LLE with assistance 2-/5 strength ,left pt  in stable condition with all lines intact,and call bell at reach.     Rehab Services - Progress: progress toward functional goals is gradual    Education    Person Taught/Method: patient instructed;  verbal instruction;  skill demonstration;  safety -to use call bell to ask for assistance with transfers /OOB activities ,proper body mechanics, PT role, ROM ex's, discharge planning.     General Teaching    Generic Teaching Goals/Outcomes  Generic Teaching Rehabilitation: (2) meets goals/outcomes   verbalization;  show back    Recommendations      Physical Therapy Recommendations: Rehabilitation facility           FAMILY HISTORY   Family history of stroke (Mother)        RECENT LABS/IMAGING  < from: CT Lumbar Spine No Cont (21 @ 17:33) >    EXAM:  CT LUMBAR SPINE      EXAM:  IR PROCEDURE        PROCEDURE DATE:  May 27 2021         INTERPRETATION:  Clinical indication: Left lower extremity weakness.    The patient was explained the risks benefits alternatives to a fluoroscopic-guided lumbar puncture for a lumbar CT myelogram.    Patient signed consent.    Using sterile technique fluoroscopic guidance a 20-gauge spinal was inserted at the L2-3 level. Approximately 15 cc of Omnipaque 180 was injected under fluoroscopic guidance. Contrast appears to flow well.    AP, lateral and bilateral oblique views were obtained.    There appears to be narrowing of the contrast column at the L2-3 L3-4 and L4-5 levels.    The patient was brought to CT scan area and multiple axial sections were performed through the lumbar spine. Coronal and sagittal reconstructions were performed as well    Sagittal and coronal reconstructions were performed as well.    This exam is somewhat limited by patient's body habitus.    L5-S1: Contrast filled thecal sac is seen. No significant, as of the spinal canal or either neural foramen    L4-5: There is paucity of contrast seen in the thecal sac at this level. There is compression of thecal sac by a possible epidural lesion, though it is difficult to be sure given the quality of the study. Bilateral hypertrophic facet joint changes are seen. Significant narrowing of the left neural foramen is seen.    L3-4: There is paucity of contrast seen in the thecal sac at this level as well. There is compressionof thecal sac identified suspected though not as severe as at the L4-5 level. This too is thought to represent an epidural mass but it is difficult to be sure. Bilateral hypertrophic facet joint changes are seen. Significant narrowing of the left neural foramen is seen.    L2-3: Disc bulge and bilateral hypertrophic facet joint changes seen. Moderate narrowing of the contrast filled thecal sac is seen. Diminished enhancement of the left nerve root is identified.    L1-2: Normal contrast filled thecal sac is seen.    T12-L1: Normal contrast filled thecal sac is seen.    Evaluation of the paraspinal soft tissues is limited by lack of IV contrast.    Impression: Paucity of contrast seen at the L3-4 and L4-5 level which is suspicious for an underlying mass. This could be compatible with disc material though the possibly of underlying neoplastic process cannot be entirely excluded.        HEVER GOODRICH M.D., ATTENDING RADIOLOGIST  This document has been electronically signed. May 27 2021  7:08PM    < end of copied text >    CBC Full  -  ( 28 May 2021 04:16 )  WBC Count : 5.86 K/uL  RBC Count : 4.98 M/uL  Hemoglobin : 13.9 g/dL  Hematocrit : 43.7 %  Platelet Count - Automated : 247 K/uL  Mean Cell Volume : 87.8 fL  Mean Cell Hemoglobin : 27.9 pg  Mean Cell Hemoglobin Concentration : 31.8 gm/dL  Auto Neutrophil # : x  Auto Lymphocyte # : x  Auto Monocyte # : x  Auto Eosinophil # : x  Auto Basophil # : x  Auto Neutrophil % : x  Auto Lymphocyte % : x  Auto Monocyte % : x  Auto Eosinophil % : x  Auto Basophil % : x        137  |  104  |  37<H>  ----------------------------<  116<H>  3.7   |  20<L>  |  1.27    Ca    10.3      28 May 2021 04:16  Phos  3.1       Mg     1.8               VITALS  T(C): 36.6 (21 @ 14:59), Max: 36.7 (21 @ 19:04)  HR: 81 (21 @ 14:59) (69 - 88)  BP: 154/77 (21 @ 14:59) (151/79 - 155/69)  RR: 18 (21 @ 14:59) (16 - 18)  SpO2: 100% (21 @ 14:59) (94% - 100%)  Wt(kg): --    ALLERGIES  No Known Allergies      MEDICATIONS   acetaminophen   Tablet .. 650 milliGRAM(s) Oral every 6 hours PRN  aspirin enteric coated 81 milliGRAM(s) Oral daily  atorvastatin 40 milliGRAM(s) Oral at bedtime  cloNIDine 0.1 milliGRAM(s) Oral at bedtime  dextrose 40% Gel 15 Gram(s) Oral once  dextrose 5%. 1000 milliLiter(s) IV Continuous <Continuous>  dextrose 5%. 1000 milliLiter(s) IV Continuous <Continuous>  dextrose 50% Injectable 25 Gram(s) IV Push once  dextrose 50% Injectable 12.5 Gram(s) IV Push once  dextrose 50% Injectable 25 Gram(s) IV Push once  enoxaparin Injectable 40 milliGRAM(s) SubCutaneous two times a day  fluticasone propionate 50 MICROgram(s)/spray Nasal Spray 1 Spray(s) Both Nostrils two times a day  glucagon  Injectable 1 milliGRAM(s) IntraMuscular once  hydrochlorothiazide 50 milliGRAM(s) Oral <User Schedule>  insulin lispro (ADMELOG) corrective regimen sliding scale   SubCutaneous three times a day before meals  insulin lispro (ADMELOG) corrective regimen sliding scale   SubCutaneous at bedtime  NIFEdipine XL 90 milliGRAM(s) Oral daily  nystatin Powder 1 Application(s) Topical two times a day  polyethylene glycol 3350 17 Gram(s) Oral daily  sodium chloride 0.45%. 1000 milliLiter(s) IV Continuous <Continuous>  sodium chloride 0.9% lock flush 3 milliLiter(s) IV Push every 8 hours  sodium chloride 0.9%. 1000 milliLiter(s) IV Continuous <Continuous>      ----------------------------------------------------------------------------------------  PHYSICAL EXAM  Constitutional - NAD   HEENT - NCAT, EOMI   Chest -no respiratory distress  Cardiovascular - RRR, S1S2   Abdomen -  Soft, NTND  Extremities - No C/C/E, No calf tenderness   Neurologic Exam -                    Cognitive - Awake, Alert, AAO to self, place, date, year, situation     Communication - Fluent, No dysarthria     Cranial Nerves - CN 2-12 intact     Motor -                      LEFT    UE - ShAB 3/5, EF 4/5, EE 4/5, WE 4/5,  4/5                    RIGHT UE - ShAB 5/5, EF 5/5, EE 5/5, WE 5/5,  5/5                    LEFT    LE - HF 1/5, KE 2/5, DF 0/5, PF 1/5                    RIGHT LE - HF 3/5, KE 3/5, DF 3/5, PF 3/5        Sensory - Intact to LT       OculoVestibular - No saccades, No nystagmus, VOR         Balance - WNL Static  Psychiatric - Mood stable, Affect WNL  ----------------------------------------------------------------------------------------  ASSESSMENT/PLAN   59 yo RHD female with PMHx of Hypertension and DM, Osteoarthritis presented with left LE and left UE weakness.  chronic lacunar infarct left thalamus detected on incomplete mri exam.   CT revealed moderate to severe spinal cord narrowing  finding suspicious for possible mass on recent CT  MRI spine still pending - limited by size  possible cva: on asa, statin  hypertension: HCTZ, Nifedipine, Clonidine  constipation: miralax, bowel regimen as needed  Pain -denies  DVT PPX - lovenox  diet: regular consistent carb dash/tlc  slp- patient reports difficulty pronouncing words at times    Rehab -  recommend subacute rehab when medically cleared

## 2021-05-28 NOTE — PROGRESS NOTE ADULT - PROBLEM SELECTOR PLAN 5
- DVT PPx - IMPROVE score - 3, At Risk -  Lovenox 40mg Q12hrs for BMI >35  - Anticipate dc to rehab; pt is medically stable for dc to rehab with outpt MRI and NS f/u as outpt

## 2021-05-28 NOTE — CHART NOTE - NSCHARTNOTEFT_GEN_A_CORE
CT Lumbar spine : L2-3: Disc bulge and bilateral hypertrophic facet changes are seen. Moderate to severe narrowing of the spinal canal is seen.  L3-4: Disc bulge and bilateral hypertrophic facet joint changes are seen. Moderate to severe narrowing spinal canal is seen. Mild to moderate narrowing of the left neural foramen  L4-5: Disc bulge and bilateral hypertrophic facet changes. Moderate narrowing of the spinal canal. Severe narrowing of the left neural foramen.  L5-S1: Disc bulge and bilateral hypertrophic facet joint changes. Moderate to severe narrowing of the left neural foramen.  Evaluation of the paraspinal soft tissues is limited by lack of IV contrast though grossly normal  Calcified fibroid is seen involving the pelvic region.    Above result was discussed with attending, Neurosurgery C/s Called, recommend MRI C/T/L spine ( Ordered)
CT myelogram L/s done impression: Impression: Paucity of contrast seen at the L3-4 and L4-5 level which is suspicious for an underlying mass. This could be compatible with disc material though the possibly of underlying neoplastic process cannot be entirely excluded.    patient seen on the floor. Motor exam improving in LLE. LLE proximally 2-3/5 distally 1-2/5, SILT. moves R> LE antigravity.  MRI spine attempted multiple times unable to do due to patient weight.     - Neurology following recommending outpatient open mri brain, C/T/Ls  - no acute neurosurgical intervention neuro exam improving.   - Outpatient open mri C/T/Ls and f/u with neurosurgery outpatient.    d/w attending
Spoke with pt's PMD Dr. [place].  Pt has longstanding history of uncontrolled HTN, has been nonadherent to medications, her most recent medications are Hydralazine 100mg TID, Clonidine 0.1mg BID and Atenolol 50mg daily.  Will uptitrate her hydralazine to home dose of 100mg TID and resume Clonidine.  Initiate HCTZ tonight.   Med rec updated.
spoke to Dr Perry from Radiology regarding protocoling the  pt for a CT Myelogram. He advised me that the MRI is a better study, recommend re-attempting the MRI and address the possibility of a CT Myelogram if MRI is not tolerated.  will Discuss above with attending, and Neurosurgery
spoke to MRI regarding expediting her MRI, Tech was unable to Give me a time for the study, Pt is on schedule for today, Likely will Be done this evening.

## 2021-05-28 NOTE — PROGRESS NOTE ADULT - SUBJECTIVE AND OBJECTIVE BOX
[facility] Division of Hospital Medicine  Stacie Machado MD  Pager [phone]    Patient is a 60y old  Female who presents with a chief complaint of Pt had fallen after sudden weakness in left leg walking up steps.       SUBJECTIVE / OVERNIGHT EVENTS: cont to make small improvements in LLE movements      MEDICATIONS  (STANDING):  aspirin enteric coated 81 milliGRAM(s) Oral daily  atorvastatin 40 milliGRAM(s) Oral at bedtime  cloNIDine 0.1 milliGRAM(s) Oral at bedtime  dextrose 40% Gel 15 Gram(s) Oral once  dextrose 5%. 1000 milliLiter(s) (50 mL/Hr) IV Continuous <Continuous>  dextrose 5%. 1000 milliLiter(s) (100 mL/Hr) IV Continuous <Continuous>  dextrose 50% Injectable 25 Gram(s) IV Push once  dextrose 50% Injectable 12.5 Gram(s) IV Push once  dextrose 50% Injectable 25 Gram(s) IV Push once  enoxaparin Injectable 40 milliGRAM(s) SubCutaneous two times a day  fluticasone propionate 50 MICROgram(s)/spray Nasal Spray 1 Spray(s) Both Nostrils two times a day  glucagon  Injectable 1 milliGRAM(s) IntraMuscular once  hydrochlorothiazide 50 milliGRAM(s) Oral <User Schedule>  insulin lispro (ADMELOG) corrective regimen sliding scale   SubCutaneous three times a day before meals  insulin lispro (ADMELOG) corrective regimen sliding scale   SubCutaneous at bedtime  NIFEdipine XL 90 milliGRAM(s) Oral daily  nystatin Powder 1 Application(s) Topical two times a day  polyethylene glycol 3350 17 Gram(s) Oral daily  sodium chloride 0.45%. 1000 milliLiter(s) (75 mL/Hr) IV Continuous <Continuous>  sodium chloride 0.9% lock flush 3 milliLiter(s) IV Push every 8 hours  sodium chloride 0.9%. 1000 milliLiter(s) (75 mL/Hr) IV Continuous <Continuous>    MEDICATIONS  (PRN):  acetaminophen   Tablet .. 650 milliGRAM(s) Oral every 6 hours PRN Mild Pain (1 - 3), Moderate Pain (4 - 6)      CAPILLARY BLOOD GLUCOSE  POCT Blood Glucose.: 107 mg/dL (28 May 2021 08:58)  POCT Blood Glucose.: 115 mg/dL (27 May 2021 21:05)  POCT Blood Glucose.: 107 mg/dL (27 May 2021 18:53)  POCT Blood Glucose.: 138 mg/dL (27 May 2021 12:28)      PHYSICAL EXAM:  Vital Signs Last 24 Hrs  T(F): 98 (28 May 2021 04:55), Max: 98 (27 May 2021 19:04)  HR: 84 (28 May 2021 06:50) (69 - 88)  BP: 155/69 (28 May 2021 06:50) (151/79 - 155/69)  RR: 18 (28 May 2021 04:55) (16 - 18)  SpO2: 100% (28 May 2021 04:55) (94% - 100%)    CONSTITUTIONAL: NAD, morbidly obese  RESPIRATORY: Normal respiratory effort; lungs are clear to auscultation ant  CARDIOVASCULAR: Regular rate and rhythm; No lower extremity edema;   ABDOMEN: Nontender to palpation, normoactive bowel sounds  MUSCULOSKELETAL:  no joint swelling or tenderness to palpation  PSYCH: A+O to person, place, and time; affect appropriate  NEUROLOGY: small improvement in LLE movements       LABS:                        13.9   5.86  )-----------( 247      ( 28 May 2021 04:16 )             43.7     05-28    137  |  104  |  37<H>  ----------------------------<  116<H>  3.7   |  20<L>  |  1.27    Ca    10.3      28 May 2021 04:16  Phos  3.1     05-28  Mg     1.8     05-28

## 2021-05-28 NOTE — DISCHARGE NOTE PROVIDER - NSDCFUADDAPPT_GEN_ALL_CORE_FT
Follow up with Neuro Surgeon Dr Cody  Follow up with Neurologist Dr Foster  Follow up with with Primary Care doctor or [facility] Please follow up with your primary care provider in 1 to 2 weeks for further care. If you don't have a primary care provider please follow up at our Medicine Clinic at  [facility] [place] [address] [phone] or [phone]  (please call to make appointment)

## 2021-05-28 NOTE — DISCHARGE NOTE PROVIDER - NSFOLLOWUPCLINICS_GEN_ALL_ED_FT
[facility] General Internal Medicine  General Internal Medicine  [address] [place] [address]  Phone: [phone]  Fax:

## 2021-05-28 NOTE — PROGRESS NOTE ADULT - SUBJECTIVE AND OBJECTIVE BOX
Neurology Progress Note    S: Patient seen and examined. No new events overnight. patient denied CP, SOB, HA or pain. PMR rec AR.   CT L spine obtained with CHRISTY. CT myelogram with L3/4 and L4/5 mass/disc. LLE improving       Medication:  MEDICATIONS  (STANDING):  aspirin enteric coated 81 milliGRAM(s) Oral daily  atorvastatin 40 milliGRAM(s) Oral at bedtime  cloNIDine 0.1 milliGRAM(s) Oral at bedtime  dextrose 40% Gel 15 Gram(s) Oral once  dextrose 5%. 1000 milliLiter(s) (50 mL/Hr) IV Continuous <Continuous>  dextrose 5%. 1000 milliLiter(s) (100 mL/Hr) IV Continuous <Continuous>  dextrose 50% Injectable 25 Gram(s) IV Push once  dextrose 50% Injectable 12.5 Gram(s) IV Push once  dextrose 50% Injectable 25 Gram(s) IV Push once  enoxaparin Injectable 40 milliGRAM(s) SubCutaneous two times a day  fluticasone propionate 50 MICROgram(s)/spray Nasal Spray 1 Spray(s) Both Nostrils two times a day  glucagon  Injectable 1 milliGRAM(s) IntraMuscular once  hydrochlorothiazide 50 milliGRAM(s) Oral <User Schedule>  insulin lispro (ADMELOG) corrective regimen sliding scale   SubCutaneous three times a day before meals  insulin lispro (ADMELOG) corrective regimen sliding scale   SubCutaneous at bedtime  NIFEdipine XL 90 milliGRAM(s) Oral daily  nystatin Powder 1 Application(s) Topical two times a day  polyethylene glycol 3350 17 Gram(s) Oral daily  sodium chloride 0.45%. 1000 milliLiter(s) (75 mL/Hr) IV Continuous <Continuous>  sodium chloride 0.9% lock flush 3 milliLiter(s) IV Push every 8 hours  sodium chloride 0.9%. 1000 milliLiter(s) (75 mL/Hr) IV Continuous <Continuous>    MEDICATIONS  (PRN):  acetaminophen   Tablet .. 650 milliGRAM(s) Oral every 6 hours PRN Mild Pain (1 - 3), Moderate Pain (4 - 6)      Vitals:  Vital Signs Last 24 Hrs  T(C): 36.7 (28 May 2021 04:55), Max: 36.7 (27 May 2021 19:04)  T(F): 98 (28 May 2021 04:55), Max: 98 (27 May 2021 19:04)  HR: 84 (28 May 2021 06:50) (68 - 88)  BP: 155/69 (28 May 2021 06:50) (151/79 - 155/69)  BP(mean): --  RR: 18 (28 May 2021 04:55) (16 - 18)  SpO2: 100% (28 May 2021 04:55) (94% - 100%)    General:  Constitutional: Obese Female, appears stated age, in no apparent distress including pain  Head: Normocephalic & atraumatic.    Neurological (>12):  MS: Awake, alert, oriented to person, place, situation, time. Normal affect. Follows all commands.    Language: Speech is clear, fluent with good repetition & comprehension.    CNs: PERRLA (R = 3mm, L = 3mm). VF intact in all 4 quadrants, EOMI no nystagmus. Some reduced sensation to pinprick on the L side of the face, intact to LT throughout, well developed masseter muscles b/l. No facial asymmetry b/l, full eye closure strength b/l. Symmetric palate elevation in midline. Shoulder shrug intact b/l. Tongue midline, normal movements, no atrophy.    Motor: Normal muscle bulk & tone RUE. . No noticeable tremor or seizure. Noted L arm drift- improved. LLE 2-3, now 1-2/5. RLE 3-4/5    Sensation: Reduced sensation to pinprick in the distal L leg and somewhat on the lateral thigh. Reduced sensation to pinprick on the distal L arm. Intact to vibratory sense and proprioception throughout.    Reflexes:              Biceps(C5)       BR(C6)     Triceps(C7)               Patellar(L4)    Achilles(S1)    Plantar Resp  R	2	          2	             2		        0		    1		Mute   L	2	          2	             2		        0		    1		Mute     Coordination: No dysmetria to FTN    Gait: Deferred    I personally reviewed the below data/images/labs:    CBC Full  -  ( 28 May 2021 04:16 )  WBC Count : 5.86 K/uL  RBC Count : 4.98 M/uL  Hemoglobin : 13.9 g/dL  Hematocrit : 43.7 %  Platelet Count - Automated : 247 K/uL  Mean Cell Volume : 87.8 fL  Mean Cell Hemoglobin : 27.9 pg  Mean Cell Hemoglobin Concentration : 31.8 gm/dL  Auto Neutrophil # : x  Auto Lymphocyte # : x  Auto Monocyte # : x  Auto Eosinophil # : x  Auto Basophil # : x  Auto Neutrophil % : x  Auto Lymphocyte % : x  Auto Monocyte % : x  Auto Eosinophil % : x  Auto Basophil % : x        137  |  104  |  37<H>  ----------------------------<  116<H>  3.7   |  20<L>  |  1.27    Ca    10.3      28 May 2021 04:16  Phos  3.1       Mg     1.8             Urinalysis Basic - ( 17 May 2021 06:51 )    Color: Light Yellow / Appearance: Clear / S.037 / pH: x  Gluc: x / Ketone: Small  / Bili: Negative / Urobili: <2 mg/dL   Blood: x / Protein: Trace / Nitrite: Negative   Leuk Esterase: Negative / RBC: 0 /HPF / WBC 1 /HPF   Sq Epi: x / Non Sq Epi: Occasional / Bacteria: Few      < from: CT Hip No Cont, Left (21 @ 23:06) >    EXAM:  CT HIP ONLY LT        PROCEDURE DATE:  May 16 2021         INTERPRETATION:  HISTORY: Left-sided hip pain.    Helical CT imaging of the left hip was performed without intravenous contrast. Sagittal and coronal reformats were provided. 3-D reformats were performed on a separate workstation.    Correlation is made to prior radiographs from a 2021.    FINDINGS:    There is no evidence of acute fracture or dislocation. There is severe left hip arthrosis with prominent subchondral cystic change along the posterior margin of the articulation. There is mild bilateral sacral iliac joint arthrosis.    There is no subcutaneous or intramuscular hematoma. There is a calcified uterine fibroid. There is colonic diverticulosis.    IMPRESSION:    Severe left hip arthrosis.    No evidence of acute fracture or dislocation.              ANNE VIDALES MD; Attending Radiologist  This document has been electronically signed. May 17 2021  8:50AM    < end of copied text >  < from: CT Angio Head w/ IV Cont (21 @ 23:06) >    EXAM:  CT ANGIO BRAIN (W)AW IC      EXAM:  CT ANGIO NECK (W)AW IC        PROCEDURE DATE:  May 16 2021         INTERPRETATION:  HISTORY: Left lower extremity weakness and slurred speech, for 2-3 days    COMPARISON: No similar prior    TECHNIQUE: Noncontrast CT head and CT angiogram of the neck and brain was performed after administration of intravenous contrast. MIP and 3D reconstructions were performed.    Total of 90 cc Omnipaque 350 intravenous contrast were administered without complication.10 cc discarded.    FINDINGS:    CT HEAD:    No acute intracranial hemorrhage, or CT evidence of acute, large territorial infarct. There is an age-indeterminate lacunar infarct in the left thalamus.. No hydrocephalus. Basilar cisterns are clear. Scalp and imaged midfacial soft tissues are unremarkable. Calvarium is intact.    CTA BRAIN  This study is degraded by venous contamination.    INTERNAL CAROTID ARTERIES:  The intracranial segments of the ICA are patent without hemodynamically significantstenosis, occlusion, or aneurysm. The ICA bifurcations are unremarkable.    ANTERIOR CEREBRAL ARTERIES: No proximal flow-limiting stenosis or occlusion. Anterior communicating artery is unremarkable without aneurysm.    MIDDLE CEREBRAL ARTERIES: No proximal flow-limiting stenosis or occlusion. MCA bifurcations are unremarkable without aneurysm.    POSTERIOR CEREBRAL ARTERIES: No proximal flow-limiting stenosis or occlusion. Posterior communicating artery is well-developed on the right.    VERTEBROBASILAR SYSTEM: The right vertebral artery predominantly terminates in the PICA. The basilar flow is predominantly supplied by the left vertebral artery. The distal left V4 segment demonstrates short segment of attenuated flow, estimated at 50%. The basilar tip is unremarkable    CTA NECK  This study is degraded by combination of streak artifact from the patient's body habitus and shoulder positioning as well and motion.    RIGHT CAROTID SYSTEM: Normal in course and caliber without flow-limiting stenosis or occlusion.    LEFT CAROTID SYSTEM: Normal in course and caliber without flow-limiting stenosis or occlusion.    VERTEBRAL SYSTEM: Very limited evaluation of the origin and majority of the cervical portions of the bilateral vertebral arteries due to the above limitations. The arteries are grossly patent.    AORTIC ARCH: Typical three-vessel arch..    IMPRESSION:    CT HEAD: Age-indeterminate lacunar infarct in the left thalamus. No acute intracranial hemorrhage.    CTA BRAIN: Degraded exam. No evidence of proximal vessel occlusion or flow-limiting stenosis.    CTA NECK: Degraded exam, with essentially nondiagnostic evaluation of the vertebral arteries. No occlusion or flow-limiting stenosis in the carotid arteries.            SINDI ESQUEDA MD, Resident Radiology  This document has been electronically signed.  ASUNCION GARRIDO MD; Attending Radiologist  This document has been electronically signed. May 16 2021 11:51PM    < end of copied text >  < from: MR Head No Cont (21 @ 13:31) >    EXAM:  MR BRAIN        PROCEDURE DATE:  May 18 2021         INTERPRETATION:  CLINICAL STATEMENT: Left-sided weakness    TECHNIQUE: MRI of the brain was performed without gadolinium.    COMPARISON: CT head 2021    FINDINGS:  Incomplete exam dueto technical failure. Only the sagittal and axial T1 sequences obtained.    No midline shift. No gross mass effect. No hydrocephalus. Probable chronic lacunar infarct left thalamus    Nonspecific diffuse low T1 bone marrow signal noted which could berelated to red marrow hyperplasia in the correct clinical setting.    IMPRESSION:  Incomplete exam. Repeat exam recommended.        TATIANA CHESTER MD; Attending Radiologist  This document has been electronically signed. May 18 2021  2:12PM    < end of copied text >  < from: CT Lumbar Spine w/ IV Cont (21 @ 11:44) >    EXAM:  CT LUMBAR SPINE IC        PROCEDURE DATE:  May 20 2021         INTERPRETATION:  Clinical indication: Lower extremity weakness    Multiple axial sections were performed through the lumbar spine. Coronal and sagittal reconstructions were performed as well.    This exam is somewhat limited by motion.    Scoliosis is seen.    Loss of the normal lumbar lordosis is seen.    Schmorl's nodes are seen multiple levels    Disc space narrowing endplate sclerotic change and osteophytes are seen at multiple levels. This is most prominent at the L5-S1 level and secondary to chronic degenerative changes.    Vacuum disc changes seen involving the L3-4 level which is secondary to chronic degenerative change.    T11-12: Normal    T12-L1: Normal    L1-2: Normal    L2-3: Disc bulge and bilateral hypertrophic facet changes are seen. Moderate to severe narrowing of the spinal canal is seen.    L3-4: Disc bulge and bilateral hypertrophic facet joint changes are seen. Moderate to severe narrowing spinal canal is seen. Mild to moderate narrowing of the left neural foramen    L4-5: Disc bulge and bilateral hypertrophic facet changes. Moderate narrowing of the spinal canal. Severe narrowing of the left neural foramen.    L5-S1: Disc bulge and bilateral hypertrophic facet joint changes. Moderate to severe narrowing of the left neural foramen.    Evaluation of the paraspinal soft tissues is limited by lack of IV contrast though grossly normal    Calcified fibroid is seen involving the pelvic region.    There is evidence of an IUD seen in the uterus.    IMPRESSION: Degenerative changes as described above.    MRI can be done for further evaluation if there are no contraindications.              HEVER GOODRICH M.D., ATTENDING RADIOLOGIST  This document has been electronically signed. May 20 2021 11:58AM    < end of copied text >    < from: CT Lumbar Spine No Cont (05.27.21 @ 17:33) >    EXAM:  CT LUMBAR SPINE      EXAM:  IR PROCEDURE        PROCEDURE DATE:  May 27 2021         INTERPRETATION:  Clinical indication: Left lower extremity weakness.    The patient was explained the risks benefits alternatives to a fluoroscopic-guided lumbar puncture for a lumbar CT myelogram.    Patient signed consent.    Using sterile technique fluoroscopic guidance a 20-gauge spinal was inserted at the L2-3 level. Approximately 15 cc of Omnipaque 180 was injected under fluoroscopic guidance. Contrast appears to flow well.    AP, lateral and bilateral oblique views were obtained.    There appears to be narrowing of the contrast column at the L2-3 L3-4 and L4-5 levels.    The patient was brought to CT scan area and multiple axial sections were performed through the lumbar spine. Coronal and sagittal reconstructions were performed as well    Sagittal and coronal reconstructions were performed as well.    This exam is somewhat limited by patient's body habitus.    L5-S1: Contrast filled thecal sac is seen. No significant, as of the spinal canal or either neural foramen    L4-5: There is paucity of contrast seen in the thecal sac at this level. There is compression of thecal sac by a possible epidural lesion, though it is difficult to be sure given the quality of the study. Bilateral hypertrophic facet joint changes are seen. Significant narrowing of the left neural foramen is seen.    L3-4: There is paucity of contrast seen in the thecal sac at this level as well. There is compressionof thecal sac identified suspected though not as severe as at the L4-5 level. This too is thought to represent an epidural mass but it is difficult to be sure. Bilateral hypertrophic facet joint changes are seen. Significant narrowing of the left neural foramen is seen.    L2-3: Disc bulge and bilateral hypertrophic facet joint changes seen. Moderate narrowing of the contrast filled thecal sac is seen. Diminished enhancement of the left nerve root is identified.    L1-2: Normal contrast filled thecal sac is seen.    T12-L1: Normal contrast filled thecal sac is seen.    Evaluation of the paraspinal soft tissues is limited by lack of IV contrast.    Impression: Paucity of contrast seen at the L3-4 and L4-5 level which is suspicious for an underlying mass. This could be compatible with disc material though the possibly of underlying neoplastic process cannot be entirely excluded.              HEVER GOODRICH M.D., ATTENDING RADIOLOGIST  This document has been electronically signed. May 27 2021  7:08PM    < end of copied text >

## 2021-05-28 NOTE — DISCHARGE NOTE PROVIDER - PROVIDER TOKENS
PROVIDER:[TOKEN:[[id number]]] PROVIDER:[TOKEN:[[id number]]],PROVIDER:[TOKEN:[[id number]]] PROVIDER:[TOKEN:[[id number]],FOLLOWUP:[2 weeks]],PROVIDER:[TOKEN:[[id number]],FOLLOWUP:[2 weeks]]

## 2021-05-28 NOTE — DISCHARGE NOTE PROVIDER - NSDCCPCAREPLAN_GEN_ALL_CORE_FT
PRINCIPAL DISCHARGE DIAGNOSIS  Diagnosis: Leg weakness  Assessment and Plan of Treatment: Your complains of leg weakness are possibly due to narrowing on the spinal canal that was seen in the CT scan. In order to get a better reading more testing needs to done in an outpatient Open MRI setting.         SECONDARY DISCHARGE DIAGNOSES  Diagnosis: Hypertensive disease  Assessment and Plan of Treatment: You are on Procardia because your blood pressure was high. Please continue your medications as directed and follow-up with your primary provider/cardiologist to further manage your care.     PRINCIPAL DISCHARGE DIAGNOSIS  Diagnosis: Leg weakness  Assessment and Plan of Treatment: Your complains of leg weakness are possibly due to narrowing on the spinal canal that was seen in the CT scan. In order to get a better reading more testing needs to done in an outpatient Open MRI setting.  Follow up with Neuro Surgeon Dr Cody and Neurologist Dr Foster        SECONDARY DISCHARGE DIAGNOSES  Diagnosis: Hypertensive disease  Assessment and Plan of Treatment: You are on Procardia and clonidine because your blood pressure was high. Please continue your medications as directed and follow-up with your primary provider/cardiologist to further manage your care.     PRINCIPAL DISCHARGE DIAGNOSIS  Diagnosis: Leg weakness  Assessment and Plan of Treatment: Your complains of leg weakness are possibly due to narrowing on the spinal canal that was seen in the CT scan. Continue aspirin and atorvastatin as directed. In order to get a better reading more testing needs to done in an outpatient Open MRI setting.  Follow up with Neuro Surgeon Dr Cody and Neurologist Dr Foster in 1 to 2 weeks. Please call to make an appointment.   Return to ER if any new or worsening symptoms develop such as  headache, dizziness, slurred speech, worsening arm or leg weakness, changes in your bladder or bowel function develop.        SECONDARY DISCHARGE DIAGNOSES  Diagnosis: Type 2 diabetes mellitus  Assessment and Plan of Treatment: Continue your medication regimen and a consistent carbohydrate diet (Meaning eating the same amount of carbohydrates at the same time each day). Monitor blood glucose levels throughout the day before meals and at bedtime. Record blood sugars and bring to outpatient providers appointment in order to be reviewed by your doctor for management modifications. If your sugars are more than 400 or less than 70 you should contact your PCP immediately. Monitor for signs/symptoms of low blood glucose, such as, dizziness, altered mental status, or cool/clammy skin. In addition, monitor for signs/symptoms of high blood glucose, such as, feeling hot, dry, fatigued, or with increased thirst/urination. Make regular podiatry appointments in order to have feet checked for wounds and uncontrolled toe nail growth to prevent infections, as well as, appointments with an ophthalmologist to monitor your vision.  If you do not have an endocrinologist, please call   [facility]  [address] [facility]  [place] [address]  [phone]    Diagnosis: Hypertensive disease  Assessment and Plan of Treatment: You are on Procardia, hydrochlorothiazide and clonidine because your blood pressure was high. Please continue your medications as directed and follow-up with your primary provider/cardiologist to further manage your care. Please do not continue atenolol or hydralazine until further evaluated by your outpatient provider.   If you don't have a primary care provider please follow up at our Medicine Clinic at  [facility] [place] [address] [phone] or [phone]  (please call to make appointment)

## 2021-05-28 NOTE — DISCHARGE NOTE PROVIDER - NSDCMRMEDTOKEN_GEN_ALL_CORE_FT
atenolol 50 mg oral tablet: 1 tab(s) orally once a day  ATORVASTATIN 20 MG TABLET:   CLONIDINE HCL 0.1 MG TABLET:   hydrALAZINE 100 mg oral tablet: 1 tab(s) orally 3 times a day  INVOKANA 300 MG TABLET:    aspirin 81 mg oral delayed release tablet: 1 tab(s) orally once a day  atorvastatin 40 mg oral tablet: 1 tab(s) orally once a day (at bedtime)  cloNIDine 0.1 mg oral tablet: 1 tab(s) orally once a day (at bedtime)  fluticasone 50 mcg/inh nasal spray: 1 spray(s) nasal 2 times a day  hydroCHLOROthiazide 50 mg oral tablet: 1 tab(s) orally   NIFEdipine 90 mg oral tablet, extended release: 1 tab(s) orally once a day  nystatin 100,000 units/g topical powder: 1 application topically 2 times a day  polyethylene glycol 3350 oral powder for reconstitution: 17 gram(s) orally once a day

## 2021-05-28 NOTE — DISCHARGE NOTE PROVIDER - HOSPITAL COURSE
60y F PMHx HTN, DM, OA, and B/L Carpel Tunnel p/w few months of leg weakness and left knee pain (5/16/21). Pt recently discharged from ED then returned for persistent weakness    CT of the Head showed left thalamic old stroke, patient was unable to tolerate MRI. Echo w/ bubble showed no PFO. CT Lumbar spine shows moderate to Severe Narrowing of spinal canal From L2 to S1- awaiting CT LS myelogram. MRI spine attempted multiple times unable to do due to patient weight. Neuro started patient on Aspirin and Lipitor. Neuro Sx recs no acute neurosurgical intervention. Needs outpatient open mri brain, C/T/Ls. F/U with neurosx Dr. Cody. Started on procardia 90 and clonidine for Hypertensive urgency.        60y F PMHx HTN, DM, OA, and B/L Carpel Tunnel p/w few months of leg weakness and left knee pain (5/16/21). Pt recently discharged from ED then returned for persistent weakness    CT of the Head showed left thalamic old stroke, patient was unable to tolerate MRI. Echo w/ bubble showed no PFO. MRI spine attempted multiple times unable to do due to patient weight. CT Lumbar spine shows moderate to Severe Narrowing of spinal canal From L2 to S1- - unable to tolerate MRI. Instead CT LS myelogram was done that showed paucity of contrast seen at the L3-4 and L4-5 level which is suspicious for an underlying mass. This could be compatible with disc material though the possibly of underlying neoplastic process cannot be entirely excluded. (poor imaging, was only able to get contrast to L4 level due to body habitus). Neuro started patient on Aspirin and Lipitor. Neuro Sx recs no acute neurosurgical intervention. Needs outpatient open mri brain, C/T/Ls. F/U with neurosx Dr. Coyd. Started on procardia 90 and clonidine for Hypertensive urgency.     **Incomplete       60y F PMHx HTN, DM, OA, and B/L Carpel Tunnel p/w few months of leg weakness and left knee pain (5/16/21). Pt recently discharged from ED then returned for persistent weakness    CT of the Head showed left thalamic old stroke, patient was unable to tolerate MRI. Echo w/ bubble showed no PFO. MRI spine attempted multiple times unable to do due to patient weight. CT Lumbar spine shows moderate to Severe Narrowing of spinal canal From L2 to S1- - unable to tolerate MRI. Instead CT LS myelogram was done that showed paucity of contrast seen at the L3-4 and L4-5 level which is suspicious for an underlying mass. This could be compatible with disc material though the possibly of underlying neoplastic process cannot be entirely excluded. (poor imaging, was only able to get contrast to L4 level due to body habitus). Neuro started patient on Aspirin and Lipitor. Neuro Sx recs no acute neurosurgical intervention. Needs outpatient open mri brain, C/T/Ls. F/U with neurosx Dr. Cody. Started on procardia 90 and clonidine for Hypertensive urgency.     On 6/1/21 this case was reviewed with Dr. Quintanilla, the patient is medically stable and optimized for discharge. All medications were reviewed and prescriptions were sent to mutually agreed upon pharmacy.

## 2021-05-28 NOTE — DISCHARGE NOTE PROVIDER - CARE PROVIDERS DIRECT ADDRESSES
,DirectAddress_Unknown ,DirectAddress_Unknown,[email or web address]

## 2021-05-29 LAB
ANION GAP SERPL CALC-SCNC: 14 MMOL/L — SIGNIFICANT CHANGE UP (ref 7–14)
BUN SERPL-MCNC: 30 MG/DL — HIGH (ref 7–23)
CALCIUM SERPL-MCNC: 10.3 MG/DL — SIGNIFICANT CHANGE UP (ref 8.4–10.5)
CHLORIDE SERPL-SCNC: 103 MMOL/L — SIGNIFICANT CHANGE UP (ref 98–107)
CO2 SERPL-SCNC: 22 MMOL/L — SIGNIFICANT CHANGE UP (ref 22–31)
CREAT SERPL-MCNC: 1.19 MG/DL — SIGNIFICANT CHANGE UP (ref 0.5–1.3)
GLUCOSE BLDC GLUCOMTR-MCNC: 116 MG/DL — HIGH (ref 70–99)
GLUCOSE BLDC GLUCOMTR-MCNC: 117 MG/DL — HIGH (ref 70–99)
GLUCOSE BLDC GLUCOMTR-MCNC: 95 MG/DL — SIGNIFICANT CHANGE UP (ref 70–99)
GLUCOSE BLDC GLUCOMTR-MCNC: 99 MG/DL — SIGNIFICANT CHANGE UP (ref 70–99)
GLUCOSE SERPL-MCNC: 110 MG/DL — HIGH (ref 70–99)
HCT VFR BLD CALC: 43.8 % — SIGNIFICANT CHANGE UP (ref 34.5–45)
HGB BLD-MCNC: 13.6 G/DL — SIGNIFICANT CHANGE UP (ref 11.5–15.5)
MAGNESIUM SERPL-MCNC: 1.7 MG/DL — SIGNIFICANT CHANGE UP (ref 1.6–2.6)
MCHC RBC-ENTMCNC: 27.9 PG — SIGNIFICANT CHANGE UP (ref 27–34)
MCHC RBC-ENTMCNC: 31.1 GM/DL — LOW (ref 32–36)
MCV RBC AUTO: 89.8 FL — SIGNIFICANT CHANGE UP (ref 80–100)
NRBC # BLD: 0 /100 WBCS — SIGNIFICANT CHANGE UP
NRBC # FLD: 0 K/UL — SIGNIFICANT CHANGE UP
PHOSPHATE SERPL-MCNC: 3.5 MG/DL — SIGNIFICANT CHANGE UP (ref 2.5–4.5)
PLATELET # BLD AUTO: 235 K/UL — SIGNIFICANT CHANGE UP (ref 150–400)
POTASSIUM SERPL-MCNC: 3.3 MMOL/L — LOW (ref 3.5–5.3)
POTASSIUM SERPL-SCNC: 3.3 MMOL/L — LOW (ref 3.5–5.3)
RBC # BLD: 4.88 M/UL — SIGNIFICANT CHANGE UP (ref 3.8–5.2)
RBC # FLD: 14.9 % — HIGH (ref 10.3–14.5)
SODIUM SERPL-SCNC: 139 MMOL/L — SIGNIFICANT CHANGE UP (ref 135–145)
WBC # BLD: 6.02 K/UL — SIGNIFICANT CHANGE UP (ref 3.8–10.5)
WBC # FLD AUTO: 6.02 K/UL — SIGNIFICANT CHANGE UP (ref 3.8–10.5)

## 2021-05-29 PROCEDURE — 99232 SBSQ HOSP IP/OBS MODERATE 35: CPT

## 2021-05-29 RX ORDER — MAGNESIUM SULFATE 500 MG/ML
2 VIAL (ML) INJECTION ONCE
Refills: 0 | Status: COMPLETED | OUTPATIENT
Start: 2021-05-29 | End: 2021-05-29

## 2021-05-29 RX ORDER — POTASSIUM CHLORIDE 20 MEQ
40 PACKET (EA) ORAL EVERY 4 HOURS
Refills: 0 | Status: COMPLETED | OUTPATIENT
Start: 2021-05-29 | End: 2021-05-29

## 2021-05-29 RX ADMIN — Medication 81 MILLIGRAM(S): at 13:29

## 2021-05-29 RX ADMIN — Medication 650 MILLIGRAM(S): at 01:09

## 2021-05-29 RX ADMIN — SODIUM CHLORIDE 3 MILLILITER(S): 9 INJECTION INTRAMUSCULAR; INTRAVENOUS; SUBCUTANEOUS at 14:43

## 2021-05-29 RX ADMIN — SODIUM CHLORIDE 3 MILLILITER(S): 9 INJECTION INTRAMUSCULAR; INTRAVENOUS; SUBCUTANEOUS at 21:14

## 2021-05-29 RX ADMIN — NYSTATIN CREAM 1 APPLICATION(S): 100000 CREAM TOPICAL at 06:12

## 2021-05-29 RX ADMIN — Medication 90 MILLIGRAM(S): at 06:11

## 2021-05-29 RX ADMIN — Medication 40 MILLIEQUIVALENT(S): at 10:03

## 2021-05-29 RX ADMIN — Medication 0.1 MILLIGRAM(S): at 21:22

## 2021-05-29 RX ADMIN — NYSTATIN CREAM 1 APPLICATION(S): 100000 CREAM TOPICAL at 17:55

## 2021-05-29 RX ADMIN — ENOXAPARIN SODIUM 40 MILLIGRAM(S): 100 INJECTION SUBCUTANEOUS at 17:54

## 2021-05-29 RX ADMIN — Medication 40 MILLIEQUIVALENT(S): at 14:29

## 2021-05-29 RX ADMIN — Medication 50 GRAM(S): at 10:04

## 2021-05-29 RX ADMIN — SODIUM CHLORIDE 3 MILLILITER(S): 9 INJECTION INTRAMUSCULAR; INTRAVENOUS; SUBCUTANEOUS at 07:07

## 2021-05-29 RX ADMIN — Medication 50 MILLIGRAM(S): at 17:54

## 2021-05-29 RX ADMIN — Medication 650 MILLIGRAM(S): at 02:09

## 2021-05-29 RX ADMIN — ATORVASTATIN CALCIUM 40 MILLIGRAM(S): 80 TABLET, FILM COATED ORAL at 21:22

## 2021-05-29 RX ADMIN — ENOXAPARIN SODIUM 40 MILLIGRAM(S): 100 INJECTION SUBCUTANEOUS at 06:11

## 2021-05-29 RX ADMIN — Medication 1 SPRAY(S): at 06:12

## 2021-05-29 RX ADMIN — POLYETHYLENE GLYCOL 3350 17 GRAM(S): 17 POWDER, FOR SOLUTION ORAL at 11:21

## 2021-05-29 RX ADMIN — Medication 1 SPRAY(S): at 17:55

## 2021-05-29 NOTE — PROGRESS NOTE ADULT - SUBJECTIVE AND OBJECTIVE BOX
Patient is a 60y old  Female who presents with a chief complaint of Pt had fallen after sudden weakness in left leg walking up steps. (29 May 2021 10:31)    SUBJECTIVE / OVERNIGHT EVENTS: No acute events overnight. Continue to suffer LLE weakness    MEDICATIONS  (STANDING):  aspirin enteric coated 81 milliGRAM(s) Oral daily  atorvastatin 40 milliGRAM(s) Oral at bedtime  cloNIDine 0.1 milliGRAM(s) Oral at bedtime  dextrose 40% Gel 15 Gram(s) Oral once  dextrose 5%. 1000 milliLiter(s) (50 mL/Hr) IV Continuous <Continuous>  dextrose 5%. 1000 milliLiter(s) (100 mL/Hr) IV Continuous <Continuous>  dextrose 50% Injectable 25 Gram(s) IV Push once  dextrose 50% Injectable 12.5 Gram(s) IV Push once  dextrose 50% Injectable 25 Gram(s) IV Push once  enoxaparin Injectable 40 milliGRAM(s) SubCutaneous two times a day  fluticasone propionate 50 MICROgram(s)/spray Nasal Spray 1 Spray(s) Both Nostrils two times a day  glucagon  Injectable 1 milliGRAM(s) IntraMuscular once  hydrochlorothiazide 50 milliGRAM(s) Oral <User Schedule>  insulin lispro (ADMELOG) corrective regimen sliding scale   SubCutaneous three times a day before meals  insulin lispro (ADMELOG) corrective regimen sliding scale   SubCutaneous at bedtime  NIFEdipine XL 90 milliGRAM(s) Oral daily  nystatin Powder 1 Application(s) Topical two times a day  polyethylene glycol 3350 17 Gram(s) Oral daily  potassium chloride    Tablet ER 40 milliEquivalent(s) Oral every 4 hours  sodium chloride 0.45%. 1000 milliLiter(s) (75 mL/Hr) IV Continuous <Continuous>  sodium chloride 0.9% lock flush 3 milliLiter(s) IV Push every 8 hours  sodium chloride 0.9%. 1000 milliLiter(s) (75 mL/Hr) IV Continuous <Continuous>    MEDICATIONS  (PRN):  acetaminophen   Tablet .. 650 milliGRAM(s) Oral every 6 hours PRN Mild Pain (1 - 3), Moderate Pain (4 - 6)      CAPILLARY BLOOD GLUCOSE      POCT Blood Glucose.: 116 mg/dL (29 May 2021 08:54)  POCT Blood Glucose.: 172 mg/dL (28 May 2021 21:00)  POCT Blood Glucose.: 99 mg/dL (28 May 2021 17:23)  POCT Blood Glucose.: 129 mg/dL (28 May 2021 12:59)    I&O's Summary      T(C): 36.2 (05-29-21 @ 11:19), Max: 36.8 (05-29-21 @ 02:40)  HR: 68 (05-29-21 @ 11:19) (68 - 81)  BP: 174/74 (05-29-21 @ 11:19) (154/77 - 174/74)  RR: 18 (05-29-21 @ 11:19) (16 - 18)  SpO2: 98% (05-29-21 @ 11:19) (98% - 100%)  PHYSICAL EXAM:  CONSTITUTIONAL: NAD, morbidly obese  RESPIRATORY: Normal respiratory effort; lungs are clear to auscultation ant  CARDIOVASCULAR: Regular rate and rhythm; No lower extremity edema;   ABDOMEN: Nontender to palpation, normoactive bowel sounds  MUSCULOSKELETAL:  no joint swelling or tenderness to palpation  PSYCH: A+O to person, place, and time; affect appropriate  NEUROLOGY: LLE weakness    LABS:                        13.6   6.02  )-----------( 235      ( 29 May 2021 07:20 )             43.8     WBC Trend: 6.02<--, 5.86<--, 5.91<--  05-29    139  |  103  |  30<H>  ----------------------------<  110<H>  3.3<L>   |  22  |  1.19    Ca    10.3      29 May 2021 07:20  Phos  3.5     05-29  Mg     1.7     05-29    Creatinine Trend: 1.19<--, 1.27<--, 1.40<--, 1.32<--, 1.24<--, 1.16<--

## 2021-05-29 NOTE — PROGRESS NOTE ADULT - PROBLEM SELECTOR PLAN 6
supplement  monitor
poss due to ATN  cont to supplement as needed  monitor for resolution  cautious supplementation due to rising Cr
resolved
supplement  monitor
resolved

## 2021-05-29 NOTE — PROGRESS NOTE ADULT - SUBJECTIVE AND OBJECTIVE BOX
Neurology Progress Note    S: Patient seen and examined. No new events overnight. patient denied CP, SOB, HA or pain. PMR rec AR.   CT L spine obtained with LUPILLOD. CT myelogram with L3/4 and L4/5 mass/disc. LLE improving d/c planning       Medication:  MEDICATIONS  (STANDING):  aspirin enteric coated 81 milliGRAM(s) Oral daily  atorvastatin 40 milliGRAM(s) Oral at bedtime  cloNIDine 0.1 milliGRAM(s) Oral at bedtime  dextrose 40% Gel 15 Gram(s) Oral once  dextrose 5%. 1000 milliLiter(s) (50 mL/Hr) IV Continuous <Continuous>  dextrose 5%. 1000 milliLiter(s) (100 mL/Hr) IV Continuous <Continuous>  dextrose 50% Injectable 25 Gram(s) IV Push once  dextrose 50% Injectable 12.5 Gram(s) IV Push once  dextrose 50% Injectable 25 Gram(s) IV Push once  enoxaparin Injectable 40 milliGRAM(s) SubCutaneous two times a day  fluticasone propionate 50 MICROgram(s)/spray Nasal Spray 1 Spray(s) Both Nostrils two times a day  glucagon  Injectable 1 milliGRAM(s) IntraMuscular once  hydrochlorothiazide 50 milliGRAM(s) Oral <User Schedule>  insulin lispro (ADMELOG) corrective regimen sliding scale   SubCutaneous three times a day before meals  insulin lispro (ADMELOG) corrective regimen sliding scale   SubCutaneous at bedtime  NIFEdipine XL 90 milliGRAM(s) Oral daily  nystatin Powder 1 Application(s) Topical two times a day  polyethylene glycol 3350 17 Gram(s) Oral daily  potassium chloride    Tablet ER 40 milliEquivalent(s) Oral every 4 hours  sodium chloride 0.45%. 1000 milliLiter(s) (75 mL/Hr) IV Continuous <Continuous>  sodium chloride 0.9% lock flush 3 milliLiter(s) IV Push every 8 hours  sodium chloride 0.9%. 1000 milliLiter(s) (75 mL/Hr) IV Continuous <Continuous>    MEDICATIONS  (PRN):  acetaminophen   Tablet .. 650 milliGRAM(s) Oral every 6 hours PRN Mild Pain (1 - 3), Moderate Pain (4 - 6)      Vitals:  Vital Signs Last 24 Hrs  T(C): 36.7 (29 May 2021 06:10), Max: 36.8 (29 May 2021 02:40)  T(F): 98.1 (29 May 2021 06:10), Max: 98.2 (29 May 2021 02:40)  HR: 80 (29 May 2021 06:10) (76 - 81)  BP: 161/81 (29 May 2021 06:10) (154/77 - 168/88)  BP(mean): --  RR: 16 (29 May 2021 06:10) (16 - 18)  SpO2: 100% (29 May 2021 06:10) (100% - 100%)    General:  Constitutional: Obese Female, appears stated age, in no apparent distress including pain  Head: Normocephalic & atraumatic.    Neurological (>12):  MS: Awake, alert, oriented to person, place, situation, time. Normal affect. Follows all commands.    Language: Speech is clear, fluent with good repetition & comprehension.    CNs: PERRLA (R = 3mm, L = 3mm). VF intact in all 4 quadrants, EOMI no nystagmus. Some reduced sensation to pinprick on the L side of the face, intact to LT throughout, well developed masseter muscles b/l. No facial asymmetry b/l, full eye closure strength b/l. Symmetric palate elevation in midline. Shoulder shrug intact b/l. Tongue midline, normal movements, no atrophy.    Motor: Normal muscle bulk & tone RUE. . No noticeable tremor or seizure. Noted L arm drift- improved. LLE 2-3, now 1-2/5. RLE 3-4/5    Sensation: Reduced sensation to pinprick in the distal L leg and somewhat on the lateral thigh. Reduced sensation to pinprick on the distal L arm. Intact to vibratory sense and proprioception throughout.    Reflexes:              Biceps(C5)       BR(C6)     Triceps(C7)               Patellar(L4)    Achilles(S1)    Plantar Resp  R	2	          2	             2		        0		    1		Mute   L	2	          2	             2		        0		    1		Mute     Coordination: No dysmetria to FTN    Gait: Deferred    I personally reviewed the below data/images/labs:    CBC Full  -  ( 29 May 2021 07:20 )  WBC Count : 6.02 K/uL  RBC Count : 4.88 M/uL  Hemoglobin : 13.6 g/dL  Hematocrit : 43.8 %  Platelet Count - Automated : 235 K/uL  Mean Cell Volume : 89.8 fL  Mean Cell Hemoglobin : 27.9 pg  Mean Cell Hemoglobin Concentration : 31.1 gm/dL  Auto Neutrophil # : x  Auto Lymphocyte # : x  Auto Monocyte # : x  Auto Eosinophil # : x  Auto Basophil # : x  Auto Neutrophil % : x  Auto Lymphocyte % : x  Auto Monocyte % : x  Auto Eosinophil % : x  Auto Basophil % : x    05-29    139  |  103  |  30<H>  ----------------------------<  110<H>  3.3<L>   |  22  |  1.19    Ca    10.3      29 May 2021 07:20  Phos  3.5       Mg     1.7             Urinalysis Basic - ( 17 May 2021 06:51 )    Color: Light Yellow / Appearance: Clear / S.037 / pH: x  Gluc: x / Ketone: Small  / Bili: Negative / Urobili: <2 mg/dL   Blood: x / Protein: Trace / Nitrite: Negative   Leuk Esterase: Negative / RBC: 0 /HPF / WBC 1 /HPF   Sq Epi: x / Non Sq Epi: Occasional / Bacteria: Few      < from: CT Hip No Cont, Left (21 @ 23:06) >    EXAM:  CT HIP ONLY LT        PROCEDURE DATE:  May 16 2021         INTERPRETATION:  HISTORY: Left-sided hip pain.    Helical CT imaging of the left hip was performed without intravenous contrast. Sagittal and coronal reformats were provided. 3-D reformats were performed on a separate workstation.    Correlation is made to prior radiographs from a 2021.    FINDINGS:    There is no evidence of acute fracture or dislocation. There is severe left hip arthrosis with prominent subchondral cystic change along the posterior margin of the articulation. There is mild bilateral sacral iliac joint arthrosis.    There is no subcutaneous or intramuscular hematoma. There is a calcified uterine fibroid. There is colonic diverticulosis.    IMPRESSION:    Severe left hip arthrosis.    No evidence of acute fracture or dislocation.              ANNE VIDALES MD; Attending Radiologist  This document has been electronically signed. May 17 2021  8:50AM    < end of copied text >  < from: CT Angio Head w/ IV Cont (21 @ 23:06) >    EXAM:  CT ANGIO BRAIN (W)AW IC      EXAM:  CT ANGIO NECK (W)AW IC        PROCEDURE DATE:  May 16 2021         INTERPRETATION:  HISTORY: Left lower extremity weakness and slurred speech, for 2-3 days    COMPARISON: No similar prior    TECHNIQUE: Noncontrast CT head and CT angiogram of the neck and brain was performed after administration of intravenous contrast. MIP and 3D reconstructions were performed.    Total of 90 cc Omnipaque 350 intravenous contrast were administered without complication.10 cc discarded.    FINDINGS:    CT HEAD:    No acute intracranial hemorrhage, or CT evidence of acute, large territorial infarct. There is an age-indeterminate lacunar infarct in the left thalamus.. No hydrocephalus. Basilar cisterns are clear. Scalp and imaged midfacial soft tissues are unremarkable. Calvarium is intact.    CTA BRAIN  This study is degraded by venous contamination.    INTERNAL CAROTID ARTERIES:  The intracranial segments of the ICA are patent without hemodynamically significantstenosis, occlusion, or aneurysm. The ICA bifurcations are unremarkable.    ANTERIOR CEREBRAL ARTERIES: No proximal flow-limiting stenosis or occlusion. Anterior communicating artery is unremarkable without aneurysm.    MIDDLE CEREBRAL ARTERIES: No proximal flow-limiting stenosis or occlusion. MCA bifurcations are unremarkable without aneurysm.    POSTERIOR CEREBRAL ARTERIES: No proximal flow-limiting stenosis or occlusion. Posterior communicating artery is well-developed on the right.    VERTEBROBASILAR SYSTEM: The right vertebral artery predominantly terminates in the PICA. The basilar flow is predominantly supplied by the left vertebral artery. The distal left V4 segment demonstrates short segment of attenuated flow, estimated at 50%. The basilar tip is unremarkable    CTA NECK  This study is degraded by combination of streak artifact from the patient's body habitus and shoulder positioning as well and motion.    RIGHT CAROTID SYSTEM: Normal in course and caliber without flow-limiting stenosis or occlusion.    LEFT CAROTID SYSTEM: Normal in course and caliber without flow-limiting stenosis or occlusion.    VERTEBRAL SYSTEM: Very limited evaluation of the origin and majority of the cervical portions of the bilateral vertebral arteries due to the above limitations. The arteries are grossly patent.    AORTIC ARCH: Typical three-vessel arch..    IMPRESSION:    CT HEAD: Age-indeterminate lacunar infarct in the left thalamus. No acute intracranial hemorrhage.    CTA BRAIN: Degraded exam. No evidence of proximal vessel occlusion or flow-limiting stenosis.    CTA NECK: Degraded exam, with essentially nondiagnostic evaluation of the vertebral arteries. No occlusion or flow-limiting stenosis in the carotid arteries.            SINDI ESQUEDA MD, Resident Radiology  This document has been electronically signed.  ASUNCION GARRIDO MD; Attending Radiologist  This document has been electronically signed. May 16 2021 11:51PM    < end of copied text >  < from: MR Head No Cont (21 @ 13:31) >    EXAM:  MR BRAIN        PROCEDURE DATE:  May 18 2021         INTERPRETATION:  CLINICAL STATEMENT: Left-sided weakness    TECHNIQUE: MRI of the brain was performed without gadolinium.    COMPARISON: CT head 2021    FINDINGS:  Incomplete exam dueto technical failure. Only the sagittal and axial T1 sequences obtained.    No midline shift. No gross mass effect. No hydrocephalus. Probable chronic lacunar infarct left thalamus    Nonspecific diffuse low T1 bone marrow signal noted which could berelated to red marrow hyperplasia in the correct clinical setting.    IMPRESSION:  Incomplete exam. Repeat exam recommended.        TATIANA CHESTER MD; Attending Radiologist  This document has been electronically signed. May 18 2021  2:12PM    < end of copied text >  < from: CT Lumbar Spine w/ IV Cont (21 @ 11:44) >    EXAM:  CT LUMBAR SPINE IC        PROCEDURE DATE:  May 20 2021         INTERPRETATION:  Clinical indication: Lower extremity weakness    Multiple axial sections were performed through the lumbar spine. Coronal and sagittal reconstructions were performed as well.    This exam is somewhat limited by motion.    Scoliosis is seen.    Loss of the normal lumbar lordosis is seen.    Schmorl's nodes are seen multiple levels    Disc space narrowing endplate sclerotic change and osteophytes are seen at multiple levels. This is most prominent at the L5-S1 level and secondary to chronic degenerative changes.    Vacuum disc changes seen involving the L3-4 level which is secondary to chronic degenerative change.    T11-12: Normal    T12-L1: Normal    L1-2: Normal    L2-3: Disc bulge and bilateral hypertrophic facet changes are seen. Moderate to severe narrowing of the spinal canal is seen.    L3-4: Disc bulge and bilateral hypertrophic facet joint changes are seen. Moderate to severe narrowing spinal canal is seen. Mild to moderate narrowing of the left neural foramen    L4-5: Disc bulge and bilateral hypertrophic facet changes. Moderate narrowing of the spinal canal. Severe narrowing of the left neural foramen.    L5-S1: Disc bulge and bilateral hypertrophic facet joint changes. Moderate to severe narrowing of the left neural foramen.    Evaluation of the paraspinal soft tissues is limited by lack of IV contrast though grossly normal    Calcified fibroid is seen involving the pelvic region.    There is evidence of an IUD seen in the uterus.    IMPRESSION: Degenerative changes as described above.    MRI can be done for further evaluation if there are no contraindications.              HEVER GOODRICH M.D., ATTENDING RADIOLOGIST  This document has been electronically signed. May 20 2021 11:58AM    < end of copied text >    < from: CT Lumbar Spine No Cont (21 @ 17:33) >    EXAM:  CT LUMBAR SPINE      EXAM:  IR PROCEDURE        PROCEDURE DATE:  May 27 2021         INTERPRETATION:  Clinical indication: Left lower extremity weakness.    The patient was explained the risks benefits alternatives to a fluoroscopic-guided lumbar puncture for a lumbar CT myelogram.    Patient signed consent.    Using sterile technique fluoroscopic guidance a 20-gauge spinal was inserted at the L2-3 level. Approximately 15 cc of Omnipaque 180 was injected under fluoroscopic guidance. Contrast appears to flow well.    AP, lateral and bilateral oblique views were obtained.    There appears to be narrowing of the contrast column at the L2-3 L3-4 and L4-5 levels.    The patient was brought to CT scan area and multiple axial sections were performed through the lumbar spine. Coronal and sagittal reconstructions were performed as well    Sagittal and coronal reconstructions were performed as well.    This exam is somewhat limited by patient's body habitus.    L5-S1: Contrast filled thecal sac is seen. No significant, as of the spinal canal or either neural foramen    L4-5: There is paucity of contrast seen in the thecal sac at this level. There is compression of thecal sac by a possible epidural lesion, though it is difficult to be sure given the quality of the study. Bilateral hypertrophic facet joint changes are seen. Significant narrowing of the left neural foramen is seen.    L3-4: There is paucity of contrast seen in the thecal sac at this level as well. There is compressionof thecal sac identified suspected though not as severe as at the L4-5 level. This too is thought to represent an epidural mass but it is difficult to be sure. Bilateral hypertrophic facet joint changes are seen. Significant narrowing of the left neural foramen is seen.    L2-3: Disc bulge and bilateral hypertrophic facet joint changes seen. Moderate narrowing of the contrast filled thecal sac is seen. Diminished enhancement of the left nerve root is identified.    L1-2: Normal contrast filled thecal sac is seen.    T12-L1: Normal contrast filled thecal sac is seen.    Evaluation of the paraspinal soft tissues is limited by lack of IV contrast.    Impression: Paucity of contrast seen at the L3-4 and L4-5 level which is suspicious for an underlying mass. This could be compatible with disc material though the possibly of underlying neoplastic process cannot be entirely excluded.              HEVER GOODRICH M.D., ATTENDING RADIOLOGIST  This document has been electronically signed. May 27 2021  7:08PM    < end of copied text >

## 2021-05-30 LAB
ANION GAP SERPL CALC-SCNC: 14 MMOL/L — SIGNIFICANT CHANGE UP (ref 7–14)
BUN SERPL-MCNC: 26 MG/DL — HIGH (ref 7–23)
CALCIUM SERPL-MCNC: 10.8 MG/DL — HIGH (ref 8.4–10.5)
CHLORIDE SERPL-SCNC: 105 MMOL/L — SIGNIFICANT CHANGE UP (ref 98–107)
CO2 SERPL-SCNC: 21 MMOL/L — LOW (ref 22–31)
CREAT SERPL-MCNC: 1.13 MG/DL — SIGNIFICANT CHANGE UP (ref 0.5–1.3)
GLUCOSE BLDC GLUCOMTR-MCNC: 100 MG/DL — HIGH (ref 70–99)
GLUCOSE BLDC GLUCOMTR-MCNC: 101 MG/DL — HIGH (ref 70–99)
GLUCOSE BLDC GLUCOMTR-MCNC: 114 MG/DL — HIGH (ref 70–99)
GLUCOSE BLDC GLUCOMTR-MCNC: 96 MG/DL — SIGNIFICANT CHANGE UP (ref 70–99)
GLUCOSE SERPL-MCNC: 112 MG/DL — HIGH (ref 70–99)
HCT VFR BLD CALC: 47.1 % — HIGH (ref 34.5–45)
HGB BLD-MCNC: 14.8 G/DL — SIGNIFICANT CHANGE UP (ref 11.5–15.5)
MAGNESIUM SERPL-MCNC: 1.9 MG/DL — SIGNIFICANT CHANGE UP (ref 1.6–2.6)
MCHC RBC-ENTMCNC: 27.8 PG — SIGNIFICANT CHANGE UP (ref 27–34)
MCHC RBC-ENTMCNC: 31.4 GM/DL — LOW (ref 32–36)
MCV RBC AUTO: 88.5 FL — SIGNIFICANT CHANGE UP (ref 80–100)
NRBC # BLD: 0 /100 WBCS — SIGNIFICANT CHANGE UP
NRBC # FLD: 0 K/UL — SIGNIFICANT CHANGE UP
PHOSPHATE SERPL-MCNC: 3.1 MG/DL — SIGNIFICANT CHANGE UP (ref 2.5–4.5)
PLATELET # BLD AUTO: 269 K/UL — SIGNIFICANT CHANGE UP (ref 150–400)
POTASSIUM SERPL-MCNC: 4 MMOL/L — SIGNIFICANT CHANGE UP (ref 3.5–5.3)
POTASSIUM SERPL-SCNC: 4 MMOL/L — SIGNIFICANT CHANGE UP (ref 3.5–5.3)
RBC # BLD: 5.32 M/UL — HIGH (ref 3.8–5.2)
RBC # FLD: 15 % — HIGH (ref 10.3–14.5)
SODIUM SERPL-SCNC: 140 MMOL/L — SIGNIFICANT CHANGE UP (ref 135–145)
WBC # BLD: 6.32 K/UL — SIGNIFICANT CHANGE UP (ref 3.8–10.5)
WBC # FLD AUTO: 6.32 K/UL — SIGNIFICANT CHANGE UP (ref 3.8–10.5)

## 2021-05-30 PROCEDURE — 99232 SBSQ HOSP IP/OBS MODERATE 35: CPT

## 2021-05-30 RX ADMIN — ENOXAPARIN SODIUM 40 MILLIGRAM(S): 100 INJECTION SUBCUTANEOUS at 18:04

## 2021-05-30 RX ADMIN — NYSTATIN CREAM 1 APPLICATION(S): 100000 CREAM TOPICAL at 18:18

## 2021-05-30 RX ADMIN — Medication 90 MILLIGRAM(S): at 05:42

## 2021-05-30 RX ADMIN — NYSTATIN CREAM 1 APPLICATION(S): 100000 CREAM TOPICAL at 06:01

## 2021-05-30 RX ADMIN — Medication 81 MILLIGRAM(S): at 11:14

## 2021-05-30 RX ADMIN — Medication 650 MILLIGRAM(S): at 21:47

## 2021-05-30 RX ADMIN — Medication 0.1 MILLIGRAM(S): at 21:46

## 2021-05-30 RX ADMIN — Medication 1 SPRAY(S): at 05:41

## 2021-05-30 RX ADMIN — ATORVASTATIN CALCIUM 40 MILLIGRAM(S): 80 TABLET, FILM COATED ORAL at 21:46

## 2021-05-30 RX ADMIN — SODIUM CHLORIDE 3 MILLILITER(S): 9 INJECTION INTRAMUSCULAR; INTRAVENOUS; SUBCUTANEOUS at 05:40

## 2021-05-30 RX ADMIN — SODIUM CHLORIDE 3 MILLILITER(S): 9 INJECTION INTRAMUSCULAR; INTRAVENOUS; SUBCUTANEOUS at 22:23

## 2021-05-30 RX ADMIN — ENOXAPARIN SODIUM 40 MILLIGRAM(S): 100 INJECTION SUBCUTANEOUS at 05:41

## 2021-05-30 RX ADMIN — SODIUM CHLORIDE 3 MILLILITER(S): 9 INJECTION INTRAMUSCULAR; INTRAVENOUS; SUBCUTANEOUS at 14:17

## 2021-05-30 RX ADMIN — Medication 50 MILLIGRAM(S): at 18:05

## 2021-05-30 NOTE — PROVIDER CONTACT NOTE (OTHER) - BACKGROUND
60 year old male with admitting diagnosis of other symptom or sign involving musculoskeletal system and PMH of Type 2 DM
Patient admitted for LLE weakness.  H/o diabetes, HTN, and obesity
Patient admitted for repeat evaluation due to weakness in legs and left knee pain. PMH of DM, hypertension.
Patient admitted for repeat evaluation due to weakness in legs and left knee pain. PMH of DM, hypertension.
Patient previously given PO blood pressure medications at 17:10
Pt admitted for left sided weakness
60 year old female with admitting diagnosis of other symptom or sign involving musculoskeletal system and PMH of Type 2 DM
60 year old female with admitting diagnosis of other symptom or sign involving musculoskeletal system and PMH of Type 2 DM. Patient BP at home has been in the 200's. want to slowly decrease BP
60 year old male with admitting diagnosis of other symptom or sign involving musculoskeletal system and PMH of Type 2 DM
Pt admitted for left sided weakness
Pt admitted for left sided weakness
patient admitted for CVA
patient admitted for CVA
Patient admitted for repeat evaluation due to weakness in legs and left knee pain. PMH of DM, hypertension.
(PMH) Type 2 diabetes mellitus  (PMH) Morbid obesity with BMI of 60.0-69.9, adult  (PMH) Hypertension  (Principal DC/DX) Leg weakness  (Problem/DX) Hypertensive urgency
Patient admitted for repeat evaluation due to weakness in legs and left knee pain. PMH of DM, hypertension.
Pt admitted for left sided weakness
(Admit Diagnosis) Other symptom or sign involving musculoskeletal system  (PMH) Endometrial hyperplasia  (PMH) Type 2 diabetes mellitus
60 year old male with admitting diagnosis of other symptom or sign involving musculoskeletal system and PMH of Type 2 DM
60 year old male with admitting diagnosis of other symptom or sign involving musculoskeletal system and PMH of Type 2 DM
Patient admitted for repeat evaluation due to weakness in legs and left knee pain. PMH of DM, hypertension.
admitted +leg weakness and pain

## 2021-05-30 NOTE — PROVIDER CONTACT NOTE (OTHER) - DATE AND TIME:
17-May-2021 10:20
17-May-2021 03:32
17-May-2021 11:15
17-May-2021 15:30
17-May-2021 18:15
17-May-2021 21:21
17-May-2021 04:19
17-May-2021 06:24
18-May-2021 15:43
18-May-2021 22:29
20-May-2021 22:20
25-May-2021 01:15
18-May-2021 11:15
21-May-2021 07:01
30-May-2021 11:37
18-May-2021 05:41
19-May-2021 12:35
25-May-2021 21:40
17-May-2021 02:35
17-May-2021 14:03
17-May-2021 17:06
18-May-2021 01:10
18-May-2021 05:10
20-May-2021 06:05
21-May-2021 06:20
21-May-2021 07:01
22-May-2021 18:07
21-May-2021 07:01
22-May-2021 02:56

## 2021-05-30 NOTE — PROVIDER CONTACT NOTE (OTHER) - SITUATION
/82
patient noted to have an elevated systolic Bp
High BP
RN notified provider regarding hypertension 195/93.
/106
Patient refusing AM meds
Patient's blood pressure at 05:58 is 188/93.
/74
/78
Patient /88
Patients bp elevated
Tele tech notified RN of 1st degree AVB on telemetry. No h/o AVB in telemetry notes or strips
/102
/82
/91
/98
High BP
patient's blood pressure is 195/93 HR 71
/106
/98
/99
Patient /96
Patient refused Miralax
patient's blood pressure is 192/81 HR 81
High BP
High BP
Patient /87
Patient /99
/75

## 2021-05-30 NOTE — PROVIDER CONTACT NOTE (OTHER) - REASON
High BP
Patient refused Miralax
Patient's blood pressure at 05:58 is 188/93
patient continues to be hypertensive after 30mg PO dose of nifedipine
/106
/74
Patient /87
Patient /88
Patient refusing AM meds
Patients bp elevated
/75
/91
/102
/82
/98
Patient /99
/106
/98
/99
hypertension 195/93
patient continues to be hypertensive
/78
High BP
High BP
Hypertension
High BP
Patient /96
/82
1st degree AVB on telemetry

## 2021-05-30 NOTE — PROGRESS NOTE ADULT - SUBJECTIVE AND OBJECTIVE BOX
Patient is a 60y old  Female who presents with a chief complaint of Pt had fallen after sudden weakness in left leg walking up steps. (29 May 2021 12:08)    SUBJECTIVE / OVERNIGHT EVENTS: No acute events overnight. Patient continue to report L LE weakness.    MEDICATIONS  (STANDING):  aspirin enteric coated 81 milliGRAM(s) Oral daily  atorvastatin 40 milliGRAM(s) Oral at bedtime  cloNIDine 0.1 milliGRAM(s) Oral at bedtime  dextrose 40% Gel 15 Gram(s) Oral once  dextrose 5%. 1000 milliLiter(s) (50 mL/Hr) IV Continuous <Continuous>  dextrose 5%. 1000 milliLiter(s) (100 mL/Hr) IV Continuous <Continuous>  dextrose 50% Injectable 25 Gram(s) IV Push once  dextrose 50% Injectable 12.5 Gram(s) IV Push once  dextrose 50% Injectable 25 Gram(s) IV Push once  enoxaparin Injectable 40 milliGRAM(s) SubCutaneous two times a day  fluticasone propionate 50 MICROgram(s)/spray Nasal Spray 1 Spray(s) Both Nostrils two times a day  glucagon  Injectable 1 milliGRAM(s) IntraMuscular once  hydrochlorothiazide 50 milliGRAM(s) Oral <User Schedule>  insulin lispro (ADMELOG) corrective regimen sliding scale   SubCutaneous three times a day before meals  insulin lispro (ADMELOG) corrective regimen sliding scale   SubCutaneous at bedtime  NIFEdipine XL 90 milliGRAM(s) Oral daily  nystatin Powder 1 Application(s) Topical two times a day  polyethylene glycol 3350 17 Gram(s) Oral daily  sodium chloride 0.45%. 1000 milliLiter(s) (75 mL/Hr) IV Continuous <Continuous>  sodium chloride 0.9% lock flush 3 milliLiter(s) IV Push every 8 hours  sodium chloride 0.9%. 1000 milliLiter(s) (75 mL/Hr) IV Continuous <Continuous>    MEDICATIONS  (PRN):  acetaminophen   Tablet .. 650 milliGRAM(s) Oral every 6 hours PRN Mild Pain (1 - 3), Moderate Pain (4 - 6)      CAPILLARY BLOOD GLUCOSE      POCT Blood Glucose.: 101 mg/dL (30 May 2021 08:59)  POCT Blood Glucose.: 117 mg/dL (29 May 2021 21:20)  POCT Blood Glucose.: 95 mg/dL (29 May 2021 17:38)  POCT Blood Glucose.: 99 mg/dL (29 May 2021 13:00)    I&O's Summary    29 May 2021 07:01  -  30 May 2021 07:00  --------------------------------------------------------  IN: 550 mL / OUT: 1000 mL / NET: -450 mL        T(C): 36.3 (05-30-21 @ 09:47), Max: 36.7 (05-29-21 @ 21:22)  HR: 82 (05-30-21 @ 09:47) (68 - 84)  BP: 143/84 (05-30-21 @ 09:47) (143/84 - 168/89)  RR: 16 (05-30-21 @ 09:47) (16 - 18)  SpO2: 94% (05-30-21 @ 09:47) (94% - 98%)  PHYSICAL EXAM:  GENERAL: NAD, well-developed  HEAD:  Atraumatic, Normocephalic  EYES: EOMI, PERRLA, conjunctiva and sclera clear  NECK: Supple, No JVD  CHEST/LUNG: Clear to auscultation bilaterally; No wheeze  HEART: Regular rate and rhythm; No murmurs, rubs, or gallops  ABDOMEN: Soft, Nontender, Nondistended; Bowel sounds present  EXTREMITIES:  2+ Peripheral Pulses, No clubbing, cyanosis, or edema  PSYCH: AAOx3  NEUROLOGY: LLE weakness  SKIN: No rashes or lesions    LABS:                        14.8   6.32  )-----------( 269      ( 30 May 2021 09:26 )             47.1     WBC Trend: 6.32<--, 6.02<--, 5.86<--  05-30    x   |  x   |  26<H>  ----------------------------<  112<H>  x    |  21<L>  |  1.13    Ca    10.8<H>      30 May 2021 09:26  Phos  3.1     05-30  Mg     1.9     05-30    Creatinine Trend: 1.13<--, 1.19<--, 1.27<--, 1.40<--, 1.32<--, 1.24<--

## 2021-05-30 NOTE — PROVIDER CONTACT NOTE (OTHER) - NAME OF MD/NP/PA/DO NOTIFIED:
ACP, Sabrina Morales
YAMIL Pereira
ACP Bibi Kline
ACP, Sabrina Morales
Selina Montalvo
Selina Montalvo
dontrell, bernard
ACP Bibi Kline
ACP Bibi Kline
Bibi Kline ACP
Natalie Rush
VERNELL Ennis
Cris Pardo
Selina Montalvo
Selina Montalvo
Sushant Xiao Tele pa
ACP Bibi Kline
Bibi Kline ACP
Sushant Xiao Tele PA
Sushant Xiao Tele pa
YAMIL Pereira
ACP Bibi Kline
ACP, Sabrina Morales
Natalie Rush
YAMIL Pereira

## 2021-05-31 LAB
ANION GAP SERPL CALC-SCNC: 13 MMOL/L — SIGNIFICANT CHANGE UP (ref 7–14)
BUN SERPL-MCNC: 30 MG/DL — HIGH (ref 7–23)
CALCIUM SERPL-MCNC: 10.8 MG/DL — HIGH (ref 8.4–10.5)
CHLORIDE SERPL-SCNC: 102 MMOL/L — SIGNIFICANT CHANGE UP (ref 98–107)
CO2 SERPL-SCNC: 22 MMOL/L — SIGNIFICANT CHANGE UP (ref 22–31)
CREAT SERPL-MCNC: 1.24 MG/DL — SIGNIFICANT CHANGE UP (ref 0.5–1.3)
GLUCOSE BLDC GLUCOMTR-MCNC: 113 MG/DL — HIGH (ref 70–99)
GLUCOSE BLDC GLUCOMTR-MCNC: 115 MG/DL — HIGH (ref 70–99)
GLUCOSE BLDC GLUCOMTR-MCNC: 127 MG/DL — HIGH (ref 70–99)
GLUCOSE BLDC GLUCOMTR-MCNC: 79 MG/DL — SIGNIFICANT CHANGE UP (ref 70–99)
GLUCOSE SERPL-MCNC: 122 MG/DL — HIGH (ref 70–99)
HCT VFR BLD CALC: 45.6 % — HIGH (ref 34.5–45)
HGB BLD-MCNC: 14.8 G/DL — SIGNIFICANT CHANGE UP (ref 11.5–15.5)
MAGNESIUM SERPL-MCNC: 1.8 MG/DL — SIGNIFICANT CHANGE UP (ref 1.6–2.6)
MCHC RBC-ENTMCNC: 28.4 PG — SIGNIFICANT CHANGE UP (ref 27–34)
MCHC RBC-ENTMCNC: 32.5 GM/DL — SIGNIFICANT CHANGE UP (ref 32–36)
MCV RBC AUTO: 87.5 FL — SIGNIFICANT CHANGE UP (ref 80–100)
NRBC # BLD: 0 /100 WBCS — SIGNIFICANT CHANGE UP
NRBC # FLD: 0 K/UL — SIGNIFICANT CHANGE UP
PHOSPHATE SERPL-MCNC: 3.7 MG/DL — SIGNIFICANT CHANGE UP (ref 2.5–4.5)
PLATELET # BLD AUTO: 245 K/UL — SIGNIFICANT CHANGE UP (ref 150–400)
POTASSIUM SERPL-MCNC: 3.6 MMOL/L — SIGNIFICANT CHANGE UP (ref 3.5–5.3)
POTASSIUM SERPL-SCNC: 3.6 MMOL/L — SIGNIFICANT CHANGE UP (ref 3.5–5.3)
RBC # BLD: 5.21 M/UL — HIGH (ref 3.8–5.2)
RBC # FLD: 14.9 % — HIGH (ref 10.3–14.5)
SARS-COV-2 RNA SPEC QL NAA+PROBE: SIGNIFICANT CHANGE UP
SODIUM SERPL-SCNC: 137 MMOL/L — SIGNIFICANT CHANGE UP (ref 135–145)
WBC # BLD: 6.57 K/UL — SIGNIFICANT CHANGE UP (ref 3.8–10.5)
WBC # FLD AUTO: 6.57 K/UL — SIGNIFICANT CHANGE UP (ref 3.8–10.5)

## 2021-05-31 PROCEDURE — 99232 SBSQ HOSP IP/OBS MODERATE 35: CPT

## 2021-05-31 RX ADMIN — Medication 90 MILLIGRAM(S): at 06:39

## 2021-05-31 RX ADMIN — SODIUM CHLORIDE 3 MILLILITER(S): 9 INJECTION INTRAMUSCULAR; INTRAVENOUS; SUBCUTANEOUS at 06:40

## 2021-05-31 RX ADMIN — ENOXAPARIN SODIUM 40 MILLIGRAM(S): 100 INJECTION SUBCUTANEOUS at 06:39

## 2021-05-31 RX ADMIN — Medication 50 MILLIGRAM(S): at 17:23

## 2021-05-31 RX ADMIN — SODIUM CHLORIDE 3 MILLILITER(S): 9 INJECTION INTRAMUSCULAR; INTRAVENOUS; SUBCUTANEOUS at 21:28

## 2021-05-31 RX ADMIN — ATORVASTATIN CALCIUM 40 MILLIGRAM(S): 80 TABLET, FILM COATED ORAL at 21:56

## 2021-05-31 RX ADMIN — SODIUM CHLORIDE 3 MILLILITER(S): 9 INJECTION INTRAMUSCULAR; INTRAVENOUS; SUBCUTANEOUS at 14:56

## 2021-05-31 RX ADMIN — Medication 81 MILLIGRAM(S): at 17:22

## 2021-05-31 RX ADMIN — ENOXAPARIN SODIUM 40 MILLIGRAM(S): 100 INJECTION SUBCUTANEOUS at 17:22

## 2021-05-31 RX ADMIN — NYSTATIN CREAM 1 APPLICATION(S): 100000 CREAM TOPICAL at 06:40

## 2021-05-31 RX ADMIN — Medication 0.1 MILLIGRAM(S): at 21:56

## 2021-05-31 RX ADMIN — Medication 1 SPRAY(S): at 06:39

## 2021-05-31 RX ADMIN — NYSTATIN CREAM 1 APPLICATION(S): 100000 CREAM TOPICAL at 17:23

## 2021-05-31 RX ADMIN — Medication 1 SPRAY(S): at 17:23

## 2021-05-31 NOTE — PROGRESS NOTE ADULT - PROBLEM SELECTOR PLAN 5
- DVT PPx - IMPROVE score - 3, At Risk -  Lovenox 40mg Q12hrs for BMI >35  - Anticipate dc to rehab; pt is medically stable for dc to rehab with outpt MRI and NS f/u as outpt    Discussed with ACP

## 2021-05-31 NOTE — PROGRESS NOTE ADULT - SUBJECTIVE AND OBJECTIVE BOX
Neurology Progress Note    S: Patient seen and examined. No new events overnight. patient denied CP, SOB, HA or pain. PMR rec AR.   CT L spine obtained with LUPILLOD. CT myelogram with L3/4 and L4/5 mass/disc. LLE improving d/c planning       Medication:  MEDICATIONS  (STANDING):  aspirin enteric coated 81 milliGRAM(s) Oral daily  atorvastatin 40 milliGRAM(s) Oral at bedtime  cloNIDine 0.1 milliGRAM(s) Oral at bedtime  dextrose 40% Gel 15 Gram(s) Oral once  dextrose 5%. 1000 milliLiter(s) (50 mL/Hr) IV Continuous <Continuous>  dextrose 5%. 1000 milliLiter(s) (100 mL/Hr) IV Continuous <Continuous>  dextrose 50% Injectable 25 Gram(s) IV Push once  dextrose 50% Injectable 12.5 Gram(s) IV Push once  dextrose 50% Injectable 25 Gram(s) IV Push once  enoxaparin Injectable 40 milliGRAM(s) SubCutaneous two times a day  fluticasone propionate 50 MICROgram(s)/spray Nasal Spray 1 Spray(s) Both Nostrils two times a day  glucagon  Injectable 1 milliGRAM(s) IntraMuscular once  hydrochlorothiazide 50 milliGRAM(s) Oral <User Schedule>  insulin lispro (ADMELOG) corrective regimen sliding scale   SubCutaneous three times a day before meals  insulin lispro (ADMELOG) corrective regimen sliding scale   SubCutaneous at bedtime  NIFEdipine XL 90 milliGRAM(s) Oral daily  nystatin Powder 1 Application(s) Topical two times a day  polyethylene glycol 3350 17 Gram(s) Oral daily  sodium chloride 0.45%. 1000 milliLiter(s) (75 mL/Hr) IV Continuous <Continuous>  sodium chloride 0.9% lock flush 3 milliLiter(s) IV Push every 8 hours  sodium chloride 0.9%. 1000 milliLiter(s) (75 mL/Hr) IV Continuous <Continuous>    MEDICATIONS  (PRN):  acetaminophen   Tablet .. 650 milliGRAM(s) Oral every 6 hours PRN Mild Pain (1 - 3), Moderate Pain (4 - 6)      Vitals:  Vital Signs Last 24 Hrs  T(C): 36.6 (31 May 2021 06:22), Max: 37.1 (30 May 2021 21:36)  T(F): 97.8 (31 May 2021 06:22), Max: 98.7 (30 May 2021 21:36)  HR: 74 (31 May 2021 06:22) (64 - 91)  BP: 152/95 (31 May 2021 06:22) (143/84 - 159/88)  BP(mean): --  RR: 17 (31 May 2021 06:22) (16 - 18)  SpO2: 95% (31 May 2021 06:22) (93% - 96%)    General:  Constitutional: Obese Female, appears stated age, in no apparent distress including pain  Head: Normocephalic & atraumatic.    Neurological (>12):  MS: Awake, alert, oriented to person, place, situation, time. Normal affect. Follows all commands.    Language: Speech is clear, fluent with good repetition & comprehension.    CNs: PERRLA (R = 3mm, L = 3mm). VF intact in all 4 quadrants, EOMI no nystagmus. Some reduced sensation to pinprick on the L side of the face, intact to LT throughout, well developed masseter muscles b/l. No facial asymmetry b/l, full eye closure strength b/l. Symmetric palate elevation in midline. Shoulder shrug intact b/l. Tongue midline, normal movements, no atrophy.    Motor: Normal muscle bulk & tone RUE. . No noticeable tremor or seizure. Noted L arm drift- improved. LLE 2-3, now 1-2/5. RLE 3-4/5    Sensation: Reduced sensation to pinprick in the distal L leg and somewhat on the lateral thigh. Reduced sensation to pinprick on the distal L arm. Intact to vibratory sense and proprioception throughout.    Reflexes:              Biceps(C5)       BR(C6)     Triceps(C7)               Patellar(L4)    Achilles(S1)    Plantar Resp  R	2	          2	             2		        0		    1		Mute   L	2	          2	             2		        0		    1		Mute     Coordination: No dysmetria to FTN    Gait: Deferred    I personally reviewed the below data/images/labs:    CBC Full  -  ( 31 May 2021 08:06 )  WBC Count : 6.57 K/uL  RBC Count : 5.21 M/uL  Hemoglobin : 14.8 g/dL  Hematocrit : 45.6 %  Platelet Count - Automated : 245 K/uL  Mean Cell Volume : 87.5 fL  Mean Cell Hemoglobin : 28.4 pg  Mean Cell Hemoglobin Concentration : 32.5 gm/dL  Auto Neutrophil # : x  Auto Lymphocyte # : x  Auto Monocyte # : x  Auto Eosinophil # : x  Auto Basophil # : x  Auto Neutrophil % : x  Auto Lymphocyte % : x  Auto Monocyte % : x  Auto Eosinophil % : x  Auto Basophil % : x    05-    137  |  102  |  30<H>  ----------------------------<  122<H>  3.6   |  22  |  1.24    Ca    10.8<H>      31 May 2021 08:06  Phos  3.7       Mg     1.8         Urinalysis Basic - ( 17 May 2021 06:51 )    Color: Light Yellow / Appearance: Clear / S.037 / pH: x  Gluc: x / Ketone: Small  / Bili: Negative / Urobili: <2 mg/dL   Blood: x / Protein: Trace / Nitrite: Negative   Leuk Esterase: Negative / RBC: 0 /HPF / WBC 1 /HPF   Sq Epi: x / Non Sq Epi: Occasional / Bacteria: Few      < from: CT Hip No Cont, Left (21 @ 23:06) >    EXAM:  CT HIP ONLY LT            PROCEDURE DATE:  May 16 2021         INTERPRETATION:  HISTORY: Left-sided hip pain.    Helical CT imaging of the left hip was performed without intravenous contrast. Sagittal and coronal reformats were provided. 3-D reformats were performed on a separate workstation.    Correlation is made to prior radiographs from a 2021.    FINDINGS:    There is no evidence of acute fracture or dislocation. There is severe left hip arthrosis with prominent subchondral cystic change along the posterior margin of the articulation. There is mild bilateral sacral iliac joint arthrosis.    There is no subcutaneous or intramuscular hematoma. There is a calcified uterine fibroid. There is colonic diverticulosis.    IMPRESSION:    Severe left hip arthrosis.    No evidence of acute fracture or dislocation.              ANNE VIDALES MD; Attending Radiologist  This document has been electronically signed. May 17 2021  8:50AM    < end of copied text >  < from: CT Angio Head w/ IV Cont (21 @ 23:06) >    EXAM:  CT ANGIO BRAIN (W)AW IC      EXAM:  CT ANGIO NECK (W)AW IC        PROCEDURE DATE:  May 16 2021         INTERPRETATION:  HISTORY: Left lower extremity weakness and slurred speech, for 2-3 days    COMPARISON: No similar prior    TECHNIQUE: Noncontrast CT head and CT angiogram of the neck and brain was performed after administration of intravenous contrast. MIP and 3D reconstructions were performed.    Total of 90 cc Omnipaque 350 intravenous contrast were administered without complication.10 cc discarded.    FINDINGS:    CT HEAD:    No acute intracranial hemorrhage, or CT evidence of acute, large territorial infarct. There is an age-indeterminate lacunar infarct in the left thalamus.. No hydrocephalus. Basilar cisterns are clear. Scalp and imaged midfacial soft tissues are unremarkable. Calvarium is intact.    CTA BRAIN  This study is degraded by venous contamination.    INTERNAL CAROTID ARTERIES:  The intracranial segments of the ICA are patent without hemodynamically significantstenosis, occlusion, or aneurysm. The ICA bifurcations are unremarkable.    ANTERIOR CEREBRAL ARTERIES: No proximal flow-limiting stenosis or occlusion. Anterior communicating artery is unremarkable without aneurysm.    MIDDLE CEREBRAL ARTERIES: No proximal flow-limiting stenosis or occlusion. MCA bifurcations are unremarkable without aneurysm.    POSTERIOR CEREBRAL ARTERIES: No proximal flow-limiting stenosis or occlusion. Posterior communicating artery is well-developed on the right.    VERTEBROBASILAR SYSTEM: The right vertebral artery predominantly terminates in the PICA. The basilar flow is predominantly supplied by the left vertebral artery. The distal left V4 segment demonstrates short segment of attenuated flow, estimated at 50%. The basilar tip is unremarkable    CTA NECK  This study is degraded by combination of streak artifact from the patient's body habitus and shoulder positioning as well and motion.    RIGHT CAROTID SYSTEM: Normal in course and caliber without flow-limiting stenosis or occlusion.    LEFT CAROTID SYSTEM: Normal in course and caliber without flow-limiting stenosis or occlusion.    VERTEBRAL SYSTEM: Very limited evaluation of the origin and majority of the cervical portions of the bilateral vertebral arteries due to the above limitations. The arteries are grossly patent.    AORTIC ARCH: Typical three-vessel arch..    IMPRESSION:    CT HEAD: Age-indeterminate lacunar infarct in the left thalamus. No acute intracranial hemorrhage.    CTA BRAIN: Degraded exam. No evidence of proximal vessel occlusion or flow-limiting stenosis.    CTA NECK: Degraded exam, with essentially nondiagnostic evaluation of the vertebral arteries. No occlusion or flow-limiting stenosis in the carotid arteries.            SINDI ESQUEDA MD, Resident Radiology  This document has been electronically signed.  ASUNCION GARRIDO MD; Attending Radiologist  This document has been electronically signed. May 16 2021 11:51PM    < end of copied text >  < from: MR Head No Cont (21 @ 13:31) >    EXAM:  MR BRAIN        PROCEDURE DATE:  May 18 2021         INTERPRETATION:  CLINICAL STATEMENT: Left-sided weakness    TECHNIQUE: MRI of the brain was performed without gadolinium.    COMPARISON: CT head 2021    FINDINGS:  Incomplete exam dueto technical failure. Only the sagittal and axial T1 sequences obtained.    No midline shift. No gross mass effect. No hydrocephalus. Probable chronic lacunar infarct left thalamus    Nonspecific diffuse low T1 bone marrow signal noted which could berelated to red marrow hyperplasia in the correct clinical setting.    IMPRESSION:  Incomplete exam. Repeat exam recommended.        TATIANA CHESTER MD; Attending Radiologist  This document has been electronically signed. May 18 2021  2:12PM    < end of copied text >  < from: CT Lumbar Spine w/ IV Cont (21 @ 11:44) >    EXAM:  CT LUMBAR SPINE IC        PROCEDURE DATE:  May 20 2021         INTERPRETATION:  Clinical indication: Lower extremity weakness    Multiple axial sections were performed through the lumbar spine. Coronal and sagittal reconstructions were performed as well.    This exam is somewhat limited by motion.    Scoliosis is seen.    Loss of the normal lumbar lordosis is seen.    Schmorl's nodes are seen multiple levels    Disc space narrowing endplate sclerotic change and osteophytes are seen at multiple levels. This is most prominent at the L5-S1 level and secondary to chronic degenerative changes.    Vacuum disc changes seen involving the L3-4 level which is secondary to chronic degenerative change.    T11-12: Normal    T12-L1: Normal    L1-2: Normal    L2-3: Disc bulge and bilateral hypertrophic facet changes are seen. Moderate to severe narrowing of the spinal canal is seen.    L3-4: Disc bulge and bilateral hypertrophic facet joint changes are seen. Moderate to severe narrowing spinal canal is seen. Mild to moderate narrowing of the left neural foramen    L4-5: Disc bulge and bilateral hypertrophic facet changes. Moderate narrowing of the spinal canal. Severe narrowing of the left neural foramen.    L5-S1: Disc bulge and bilateral hypertrophic facet joint changes. Moderate to severe narrowing of the left neural foramen.    Evaluation of the paraspinal soft tissues is limited by lack of IV contrast though grossly normal    Calcified fibroid is seen involving the pelvic region.    There is evidence of an IUD seen in the uterus.    IMPRESSION: Degenerative changes as described above.    MRI can be done for further evaluation if there are no contraindications.              HEVER GOODRICH M.D., ATTENDING RADIOLOGIST  This document has been electronically signed. May 20 2021 11:58AM    < end of copied text >    < from: CT Lumbar Spine No Cont (05. @ 17:33) >    EXAM:  CT LUMBAR SPINE      EXAM:  IR PROCEDURE        PROCEDURE DATE:  May 27 2021         INTERPRETATION:  Clinical indication: Left lower extremity weakness.    The patient was explained the risks benefits alternatives to a fluoroscopic-guided lumbar puncture for a lumbar CT myelogram.    Patient signed consent.    Using sterile technique fluoroscopic guidance a 20-gauge spinal was inserted at the L2-3 level. Approximately 15 cc of Omnipaque 180 was injected under fluoroscopic guidance. Contrast appears to flow well.    AP, lateral and bilateral oblique views were obtained.    There appears to be narrowing of the contrast column at the L2-3 L3-4 and L4-5 levels.    The patient was brought to CT scan area and multiple axial sections were performed through the lumbar spine. Coronal and sagittal reconstructions were performed as well    Sagittal and coronal reconstructions were performed as well.    This exam is somewhat limited by patient's body habitus.    L5-S1: Contrast filled thecal sac is seen. No significant, as of the spinal canal or either neural foramen    L4-5: There is paucity of contrast seen in the thecal sac at this level. There is compression of thecal sac by a possible epidural lesion, though it is difficult to be sure given the quality of the study. Bilateral hypertrophic facet joint changes are seen. Significant narrowing of the left neural foramen is seen.    L3-4: There is paucity of contrast seen in the thecal sac at this level as well. There is compressionof thecal sac identified suspected though not as severe as at the L4-5 level. This too is thought to represent an epidural mass but it is difficult to be sure. Bilateral hypertrophic facet joint changes are seen. Significant narrowing of the left neural foramen is seen.    L2-3: Disc bulge and bilateral hypertrophic facet joint changes seen. Moderate narrowing of the contrast filled thecal sac is seen. Diminished enhancement of the left nerve root is identified.    L1-2: Normal contrast filled thecal sac is seen.    T12-L1: Normal contrast filled thecal sac is seen.    Evaluation of the paraspinal soft tissues is limited by lack of IV contrast.    Impression: Paucity of contrast seen at the L3-4 and L4-5 level which is suspicious for an underlying mass. This could be compatible with disc material though the possibly of underlying neoplastic process cannot be entirely excluded.              HEVER WARSHALL M.D., ATTENDING RADIOLOGIST  This document has been electronically signed. May 27 2021  7:08PM    < end of copied text >

## 2021-05-31 NOTE — PROGRESS NOTE ADULT - SUBJECTIVE AND OBJECTIVE BOX
[facility] Division of Hospital Medicine  Selin Mariano MD  Pager: [phone]      Patient is a 60y old  Female who presents with a chief complaint of Pt had fallen after sudden weakness in left leg walking up steps. (31 May 2021 09:26)      SUBJECTIVE / OVERNIGHT EVENTS: patient states that L sided weakness is slightly improving. Knows she is awaiting WILD. Explained she needs outpatient open MRI, she is agreeable.     MEDICATIONS  (STANDING):  aspirin enteric coated 81 milliGRAM(s) Oral daily  atorvastatin 40 milliGRAM(s) Oral at bedtime  cloNIDine 0.1 milliGRAM(s) Oral at bedtime  dextrose 40% Gel 15 Gram(s) Oral once  dextrose 5%. 1000 milliLiter(s) (50 mL/Hr) IV Continuous <Continuous>  dextrose 5%. 1000 milliLiter(s) (100 mL/Hr) IV Continuous <Continuous>  dextrose 50% Injectable 25 Gram(s) IV Push once  dextrose 50% Injectable 12.5 Gram(s) IV Push once  dextrose 50% Injectable 25 Gram(s) IV Push once  enoxaparin Injectable 40 milliGRAM(s) SubCutaneous two times a day  fluticasone propionate 50 MICROgram(s)/spray Nasal Spray 1 Spray(s) Both Nostrils two times a day  glucagon  Injectable 1 milliGRAM(s) IntraMuscular once  hydrochlorothiazide 50 milliGRAM(s) Oral <User Schedule>  insulin lispro (ADMELOG) corrective regimen sliding scale   SubCutaneous three times a day before meals  insulin lispro (ADMELOG) corrective regimen sliding scale   SubCutaneous at bedtime  NIFEdipine XL 90 milliGRAM(s) Oral daily  nystatin Powder 1 Application(s) Topical two times a day  polyethylene glycol 3350 17 Gram(s) Oral daily  sodium chloride 0.45%. 1000 milliLiter(s) (75 mL/Hr) IV Continuous <Continuous>  sodium chloride 0.9% lock flush 3 milliLiter(s) IV Push every 8 hours  sodium chloride 0.9%. 1000 milliLiter(s) (75 mL/Hr) IV Continuous <Continuous>    MEDICATIONS  (PRN):  acetaminophen   Tablet .. 650 milliGRAM(s) Oral every 6 hours PRN Mild Pain (1 - 3), Moderate Pain (4 - 6)      CAPILLARY BLOOD GLUCOSE      POCT Blood Glucose.: 113 mg/dL (31 May 2021 12:43)  POCT Blood Glucose.: 115 mg/dL (31 May 2021 08:55)  POCT Blood Glucose.: 114 mg/dL (30 May 2021 22:39)  POCT Blood Glucose.: 100 mg/dL (30 May 2021 17:43)    I&O's Summary    30 May 2021 07:01  -  31 May 2021 07:00  --------------------------------------------------------  IN: 1050 mL / OUT: 1451 mL / NET: -401 mL        PHYSICAL EXAM:  Vital Signs Last 24 Hrs  T(C): 36.6 (31 May 2021 06:22), Max: 37.1 (30 May 2021 21:36)  T(F): 97.8 (31 May 2021 06:22), Max: 98.7 (30 May 2021 21:36)  HR: 74 (31 May 2021 06:22) (64 - 91)  BP: 152/95 (31 May 2021 06:22) (149/92 - 159/88)  BP(mean): --  RR: 17 (31 May 2021 06:22) (17 - 18)  SpO2: 95% (31 May 2021 06:22) (93% - 95%)    CONSTITUTIONAL: obese female in NAD, well-developed, well-groomed  ENMT: Moist oral mucosa  RESPIRATORY: Normal respiratory effort; lungs are clear to auscultation bilaterally  CARDIOVASCULAR:  S1/S2; No lower extremity edema  ABDOMEN: Nontender to palpation, normoactive bowel sounds, no rebound/guarding  MUSCULOSKELETAL:  no clubbing or cyanosis of digits; no joint swelling or tenderness to palpation  PSYCH: A+O to person, place, and time; affect appropriate  NEUROLOGY: 5/5 strength RLE, 1/5 LLE   SKIN: No rashes; no palpable lesions    LABS:                        14.8   6.57  )-----------( 245      ( 31 May 2021 08:06 )             45.6     05-31    137  |  102  |  30<H>  ----------------------------<  122<H>  3.6   |  22  |  1.24    Ca    10.8<H>      31 May 2021 08:06  Phos  3.7     05-31  Mg     1.8     05-31                  RADIOLOGY & ADDITIONAL TESTS:  Results Reviewed:   Imaging Personally Reviewed:  Electrocardiogram Personally Reviewed:    COORDINATION OF CARE:  Care Discussed with Consultants/Other Providers [Y/N]:  Prior or Outpatient Records Reviewed [Y/N]:

## 2021-06-01 ENCOUNTER — TRANSCRIPTION ENCOUNTER (OUTPATIENT)
Age: 61
End: 2021-06-01

## 2021-06-01 VITALS
DIASTOLIC BLOOD PRESSURE: 72 MMHG | RESPIRATION RATE: 18 BRPM | TEMPERATURE: 98 F | SYSTOLIC BLOOD PRESSURE: 140 MMHG | OXYGEN SATURATION: 97 % | HEART RATE: 77 BPM

## 2021-06-01 LAB
ANION GAP SERPL CALC-SCNC: 13 MMOL/L — SIGNIFICANT CHANGE UP (ref 7–14)
BUN SERPL-MCNC: 29 MG/DL — HIGH (ref 7–23)
CALCIUM SERPL-MCNC: 10.6 MG/DL — HIGH (ref 8.4–10.5)
CHLORIDE SERPL-SCNC: 101 MMOL/L — SIGNIFICANT CHANGE UP (ref 98–107)
CO2 SERPL-SCNC: 23 MMOL/L — SIGNIFICANT CHANGE UP (ref 22–31)
CREAT SERPL-MCNC: 1.16 MG/DL — SIGNIFICANT CHANGE UP (ref 0.5–1.3)
GLUCOSE BLDC GLUCOMTR-MCNC: 115 MG/DL — HIGH (ref 70–99)
GLUCOSE SERPL-MCNC: 109 MG/DL — HIGH (ref 70–99)
HCT VFR BLD CALC: 44.8 % — SIGNIFICANT CHANGE UP (ref 34.5–45)
HGB BLD-MCNC: 14.4 G/DL — SIGNIFICANT CHANGE UP (ref 11.5–15.5)
MAGNESIUM SERPL-MCNC: 1.7 MG/DL — SIGNIFICANT CHANGE UP (ref 1.6–2.6)
MCHC RBC-ENTMCNC: 28.3 PG — SIGNIFICANT CHANGE UP (ref 27–34)
MCHC RBC-ENTMCNC: 32.1 GM/DL — SIGNIFICANT CHANGE UP (ref 32–36)
MCV RBC AUTO: 88 FL — SIGNIFICANT CHANGE UP (ref 80–100)
NRBC # BLD: 0 /100 WBCS — SIGNIFICANT CHANGE UP
NRBC # FLD: 0 K/UL — SIGNIFICANT CHANGE UP
PHOSPHATE SERPL-MCNC: 3.5 MG/DL — SIGNIFICANT CHANGE UP (ref 2.5–4.5)
PLATELET # BLD AUTO: 229 K/UL — SIGNIFICANT CHANGE UP (ref 150–400)
POTASSIUM SERPL-MCNC: 3.3 MMOL/L — LOW (ref 3.5–5.3)
POTASSIUM SERPL-SCNC: 3.3 MMOL/L — LOW (ref 3.5–5.3)
RBC # BLD: 5.09 M/UL — SIGNIFICANT CHANGE UP (ref 3.8–5.2)
RBC # FLD: 14.8 % — HIGH (ref 10.3–14.5)
SODIUM SERPL-SCNC: 137 MMOL/L — SIGNIFICANT CHANGE UP (ref 135–145)
WBC # BLD: 6.58 K/UL — SIGNIFICANT CHANGE UP (ref 3.8–10.5)
WBC # FLD AUTO: 6.58 K/UL — SIGNIFICANT CHANGE UP (ref 3.8–10.5)

## 2021-06-01 PROCEDURE — 99239 HOSP IP/OBS DSCHRG MGMT >30: CPT

## 2021-06-01 RX ORDER — CANAGLIFLOZIN 100 MG/1
0 TABLET, FILM COATED ORAL
Qty: 0 | Refills: 0 | DISCHARGE

## 2021-06-01 RX ORDER — ATENOLOL 25 MG/1
1 TABLET ORAL
Qty: 0 | Refills: 0 | DISCHARGE

## 2021-06-01 RX ORDER — ASPIRIN/CALCIUM CARB/MAGNESIUM 324 MG
1 TABLET ORAL
Qty: 0 | Refills: 0 | DISCHARGE
Start: 2021-06-01

## 2021-06-01 RX ORDER — HYDRALAZINE HCL 50 MG
1 TABLET ORAL
Qty: 0 | Refills: 0 | DISCHARGE

## 2021-06-01 RX ORDER — POLYETHYLENE GLYCOL 3350 17 G/17G
17 POWDER, FOR SOLUTION ORAL
Qty: 0 | Refills: 0 | DISCHARGE
Start: 2021-06-01

## 2021-06-01 RX ORDER — ATORVASTATIN CALCIUM 80 MG/1
1 TABLET, FILM COATED ORAL
Qty: 0 | Refills: 0 | DISCHARGE
Start: 2021-06-01

## 2021-06-01 RX ORDER — NIFEDIPINE 30 MG
1 TABLET, EXTENDED RELEASE 24 HR ORAL
Qty: 0 | Refills: 0 | DISCHARGE
Start: 2021-06-01

## 2021-06-01 RX ORDER — NYSTATIN CREAM 100000 [USP'U]/G
1 CREAM TOPICAL
Qty: 0 | Refills: 0 | DISCHARGE
Start: 2021-06-01

## 2021-06-01 RX ORDER — ATORVASTATIN CALCIUM 80 MG/1
0 TABLET, FILM COATED ORAL
Qty: 0 | Refills: 0 | DISCHARGE

## 2021-06-01 RX ORDER — FLUTICASONE PROPIONATE 50 MCG
1 SPRAY, SUSPENSION NASAL
Qty: 0 | Refills: 0 | DISCHARGE
Start: 2021-06-01

## 2021-06-01 RX ORDER — POTASSIUM CHLORIDE 20 MEQ
40 PACKET (EA) ORAL EVERY 4 HOURS
Refills: 0 | Status: COMPLETED | OUTPATIENT
Start: 2021-06-01 | End: 2021-06-01

## 2021-06-01 RX ADMIN — Medication 1 SPRAY(S): at 06:12

## 2021-06-01 RX ADMIN — Medication 40 MILLIEQUIVALENT(S): at 10:39

## 2021-06-01 RX ADMIN — Medication 81 MILLIGRAM(S): at 12:50

## 2021-06-01 RX ADMIN — ENOXAPARIN SODIUM 40 MILLIGRAM(S): 100 INJECTION SUBCUTANEOUS at 06:12

## 2021-06-01 RX ADMIN — NYSTATIN CREAM 1 APPLICATION(S): 100000 CREAM TOPICAL at 07:30

## 2021-06-01 RX ADMIN — POLYETHYLENE GLYCOL 3350 17 GRAM(S): 17 POWDER, FOR SOLUTION ORAL at 12:49

## 2021-06-01 RX ADMIN — Medication 90 MILLIGRAM(S): at 06:12

## 2021-06-01 RX ADMIN — SODIUM CHLORIDE 3 MILLILITER(S): 9 INJECTION INTRAMUSCULAR; INTRAVENOUS; SUBCUTANEOUS at 07:35

## 2021-06-01 RX ADMIN — Medication 40 MILLIEQUIVALENT(S): at 13:24

## 2021-06-01 NOTE — PROGRESS NOTE ADULT - PROBLEM SELECTOR PROBLEM 3
Type 2 diabetes mellitus

## 2021-06-01 NOTE — PROGRESS NOTE ADULT - PROBLEM SELECTOR PROBLEM 4
Morbid obesity with BMI of 60.0-69.9, adult

## 2021-06-01 NOTE — PROGRESS NOTE ADULT - NSICDXPILOT_GEN_ALL_CORE
[place]

## 2021-06-01 NOTE — PROGRESS NOTE ADULT - PROVIDER SPECIALTY LIST ADULT
Neurology
Neurology
Rehab Medicine
Neurology
Rehab Medicine
Neurology
Rehab Medicine
Hospitalist

## 2021-06-01 NOTE — PROGRESS NOTE ADULT - REASON FOR ADMISSION
Pt had fallen after sudden weakness in left leg walking up steps.

## 2021-06-01 NOTE — PROGRESS NOTE ADULT - SUBJECTIVE AND OBJECTIVE BOX
Patient is a 60y old  Female who presents with a chief complaint of Pt had fallen after sudden weakness in left leg walking up steps. (01 Jun 2021 08:55)      SUBJECTIVE / OVERNIGHT EVENTS: pt in NAD states pain in LT leg better than on admission   ADDITIONAL REVIEW OF SYSTEMS: denies bowel or bladder incontinence     MEDICATIONS  (STANDING):  aspirin enteric coated 81 milliGRAM(s) Oral daily  atorvastatin 40 milliGRAM(s) Oral at bedtime  cloNIDine 0.1 milliGRAM(s) Oral at bedtime  dextrose 40% Gel 15 Gram(s) Oral once  dextrose 5%. 1000 milliLiter(s) (50 mL/Hr) IV Continuous <Continuous>  dextrose 5%. 1000 milliLiter(s) (100 mL/Hr) IV Continuous <Continuous>  dextrose 50% Injectable 25 Gram(s) IV Push once  dextrose 50% Injectable 12.5 Gram(s) IV Push once  dextrose 50% Injectable 25 Gram(s) IV Push once  enoxaparin Injectable 40 milliGRAM(s) SubCutaneous two times a day  fluticasone propionate 50 MICROgram(s)/spray Nasal Spray 1 Spray(s) Both Nostrils two times a day  glucagon  Injectable 1 milliGRAM(s) IntraMuscular once  hydrochlorothiazide 50 milliGRAM(s) Oral <User Schedule>  insulin lispro (ADMELOG) corrective regimen sliding scale   SubCutaneous three times a day before meals  insulin lispro (ADMELOG) corrective regimen sliding scale   SubCutaneous at bedtime  NIFEdipine XL 90 milliGRAM(s) Oral daily  nystatin Powder 1 Application(s) Topical two times a day  polyethylene glycol 3350 17 Gram(s) Oral daily  potassium chloride    Tablet ER 40 milliEquivalent(s) Oral every 4 hours  sodium chloride 0.9% lock flush 3 milliLiter(s) IV Push every 8 hours    MEDICATIONS  (PRN):  acetaminophen   Tablet .. 650 milliGRAM(s) Oral every 6 hours PRN Mild Pain (1 - 3), Moderate Pain (4 - 6)      CAPILLARY BLOOD GLUCOSE      POCT Blood Glucose.: 115 mg/dL (01 Jun 2021 08:43)  POCT Blood Glucose.: 127 mg/dL (31 May 2021 21:15)  POCT Blood Glucose.: 79 mg/dL (31 May 2021 17:38)  POCT Blood Glucose.: 113 mg/dL (31 May 2021 12:43)    I&O's Summary      PHYSICAL EXAM:  Vital Signs Last 24 Hrs  T(C): 36.4 (01 Jun 2021 09:50), Max: 36.8 (31 May 2021 17:15)  T(F): 97.5 (01 Jun 2021 09:50), Max: 98.2 (31 May 2021 17:15)  HR: 77 (01 Jun 2021 09:50) (67 - 90)  BP: 140/72 (01 Jun 2021 09:50) (140/72 - 178/81)  BP(mean): --  RR: 18 (01 Jun 2021 09:50) (17 - 20)  SpO2: 97% (01 Jun 2021 09:50) (94% - 97%)    CONSTITUTIONAL: obese female in NAD, well-developed, well-groomed  ENMT: Moist oral mucosa  RESPIRATORY: Normal respiratory effort; lungs are clear to auscultation bilaterally  CARDIOVASCULAR:  S1/S2; No lower extremity edema  ABDOMEN: Nontender to palpation, normoactive bowel sounds, no rebound/guarding  MUSCULOSKELETAL:  no clubbing or cyanosis of digits; no joint swelling or tenderness to palpation  PSYCH: A+O to person, place, and time; affect appropriate  NEUROLOGY: 5/5 strength RLE, 1/5 LLE   SKIN: No rashes; no palpable lesions    LABS:                        14.4   6.58  )-----------( 229      ( 01 Jun 2021 08:32 )             44.8     06-01    137  |  101  |  29<H>  ----------------------------<  109<H>  3.3<L>   |  23  |  1.16    Ca    10.6<H>      01 Jun 2021 08:32  Phos  3.5     06-01  Mg     1.7     06-01                  RADIOLOGY & ADDITIONAL TESTS:  Results Reviewed:   Imaging Personally Reviewed:  Electrocardiogram Personally Reviewed:    COORDINATION OF CARE:  Care Discussed with Consultants/Other Providers [Y/N]:  Prior or Outpatient Records Reviewed [Y/N]:

## 2021-06-01 NOTE — DISCHARGE NOTE NURSING/CASE MANAGEMENT/SOCIAL WORK - PATIENT PORTAL LINK FT
You can access the FollowMyHealth Patient Portal offered by [facility] by registering at the following website: [email or web address]. By joining [facility]’s FollowMyHealth portal, you will also be able to view your health information using other applications (apps) compatible with our system.

## 2021-06-01 NOTE — DISCHARGE NOTE NURSING/CASE MANAGEMENT/SOCIAL WORK - NSDCFUADDAPPT_GEN_ALL_CORE_FT
Follow up with Neuro Surgeon Dr Cody  Follow up with Neurologist Dr Foster  Follow up with with Primary Care doctor or [facility]

## 2021-06-01 NOTE — PROGRESS NOTE ADULT - PROBLEM SELECTOR PLAN 1
- CT myelogram with some evidence of compression but unclear by what; could be mass, could be disc material  - will need outpt MRI for t/l/s spine and NS follow up; no acute intervention at this time  - ECHO with LV hypertrophy c/w long standing HTN  - cont Aspirin 81mg, Atorvastatin 40, titrate to LDL<70, lipid profile reviewed
- Possibly 2/2 acute infarct: CTH showing age indeterminate L lacunar infarct, which can cause ipsilateral motor weakness. Radiology of left hip and knee completed, no fracture/dislocation, however L hip with severe osteoarthrosis   - Discussed with neuro attending: overall low suspicion of acute CVA, however would still obtain MRI brain.  Will also check MRI C-Spine given new LUE weakness.  Attempted MRI 5/18 however pt unable to tolerate 2/2 anxiety and pain.  Pt in agreement with trying again and will premedicate with IV Ativan and IV morphine prior to study.   - CT L spine completed and reviewed, no major pathology to explain LLE weakness   - TTE pending   - Resume Aspirin 81mg, Atorvastatin 40, titrate to LDL<70, lipid profile reviewed   - Discussed with care with PM&R DR. Doss: likely a candidate for acute rehab pending further diagnostic tests
- Possibly 2/2 acute infarct: CTH showing age indeterminate L lacunar infarct, which can cause ipsilateral motor weakness. Radiology of left hip and knee completed, no fracture/dislocation, however L hip with severe osteoarthrosis   await MRI brain, C/T/L spine-->pt unable to sundar despite pre-medication, will need CT myelogram   - TTE pending   cont Aspirin 81mg, Atorvastatin 40, titrate to LDL<70, lipid profile reviewed   - Discussed with care with PM&R Dr. Doss: likely a candidate for acute rehab pending further diagnostic tests
- Possibly 2/2 acute infarct: CTH showing age indeterminate L lacunar infarct, which can cause ipsilateral motor weakness. Radiology of left hip and knee completed, no fracture/dislocation, however L hip with severe osteoarthrosis   - Discussed with neuro attending: overall low suspicion of acute CVA, however would still obtain MRI brain.  Will also check MRI C-Spine given new LUE weakness.  Attempted MRI 5/18 however pt unable to tolerate 2/2 anxiety.  Pt in agreement with trying again and will premedicate with IV Ativan prior to study.   - Will also check CT L spine to assess for lumbar pathology of LLE weakness   - TTE pending   - Resume Aspirin 81mg, Atorvastatin 40, titrate to LDL<70, lipid profile reviewed   - Discussed with care with PM&R DR. Doss: likely a candidate for acute rehab pending further diagnostic tests
CT myelogram with some evidence of compression but unclear by what; could be mass, could be disc material  will need outpt MRI for t/l/s spine and NS follow up; no acute intervention at this time  ECHO with LV hypertrophy c/w long standing HTN  cont Aspirin 81mg, Atorvastatin 40, titrate to LDL<70, lipid profile reviewed
- Possibly 2/2 acute infarct: CTH showing age indeterminate L lacunar infarct, which can cause ipsilateral motor weakness. Radiology of left hip and knee completed, no fracture/dislocation, however L hip with severe osteoarthrosis   await MRI brain, C/T/L spine-->pt unable to sundar despite pre-medication, will need CT myelogram vs MRI with anesthesia  - TTE pending   - Resume Aspirin 81mg, Atorvastatin 40, titrate to LDL<70, lipid profile reviewed   - Discussed with care with PM&R Dr. Doss: likely a candidate for acute rehab pending further diagnostic tests
await CT myelogram   - TTE pending   cont Aspirin 81mg, Atorvastatin 40, titrate to LDL<70, lipid profile reviewed   - Discussed with care with PM&R Dr. Doss: likely a candidate for acute rehab pending further diagnostic tests
- Possibly 2/2 acute infarct: CTH showing age indeterminate L lacunar infarct, which can cause ipsilateral motor weakness. Radiology of left hip and knee completed, no fracture/dislocation, however L hip with severe osteoarthrosis   - Discussed with neuro attending: overall low suspicion of acute CVA, however would still obtain MRI brain.  Will also check MRI C-Spine given new LUE weakness; pt scheduled for MRI today   - TTE pending   - Resume Aspirin 81mg, Atorvastatin 40, titrate to LDL<70, lipid profile reviewed   - Physical Therapy and OT consult: recommend WILD
CT myelogram with some evidence of compression but unclear by what; could be mass, could be disc material  will need outpt MRI for t/l/s spine and NS follow up; no acute intervention at this time  ECHO with LV hypertrophy c/w long standing HTN  cont Aspirin 81mg, Atorvastatin 40, titrate to LDL<70, lipid profile reviewed
- Possibly 2/2 acute infarct: CTH showing age indeterminate L lacunar infarct, which can cause ipsilateral motor weakness. Radiology of left hip and knee completed, no fracture/dislocation, however L hip with severe osteoarthrosis   - Discussed with neuro attending: overall low suspicion of acute CVA, however would still obtain MRI brain.  Will also check MRI C-Spine given new LUE weakness   - TTE pending   - Resume Aspirin 81mg, Atorvastatin 40, titrate to LDL<70, check lipid profile in AM   - Physical Therapy and OT consult: recommend WILD   - For next 24 hrs, permissive HTN with goal SBP ~180 then gradual normotension
await CT myelogram   - TTE pending   cont Aspirin 81mg, Atorvastatin 40, titrate to LDL<70, lipid profile reviewed   - Discussed with care with PM&R Dr. Doss: likely a candidate for acute rehab pending further diagnostic tests
- Possibly 2/2 acute infarct: CTH showing age indeterminate L lacunar infarct, which can cause ipsilateral motor weakness. Radiology of left hip and knee completed, no fracture/dislocation, however L hip with severe osteoarthrosis   await MRI brain, C/T/L spine-->pt unable to sundar despite pre-medication, will need CT myelogram vs MRI with anesthesia  - TTE pending   - Resume Aspirin 81mg, Atorvastatin 40, titrate to LDL<70, lipid profile reviewed   - Discussed with care with PM&R Dr. Doss: likely a candidate for acute rehab pending further diagnostic tests
CT myelogram with some evidence of compression but unclear by what; could be mass, could be disc material  will need outpt MRI for t/l/s spine and NS follow up; no acute intervention at this time  ECHO with LV hypertrophy c/w long standing HTN  cont Aspirin 81mg, Atorvastatin 40, titrate to LDL<70, lipid profile reviewed
await CT myelogram   ECHO with LV hypertrophy c/w long standing HTN  cont Aspirin 81mg, Atorvastatin 40, titrate to LDL<70, lipid profile reviewed   - Discussed with care with PM&R Dr. Doss: likely a candidate for acute rehab pending further diagnostic tests
- Possibly 2/2 acute infarct: CTH showing age indeterminate L lacunar infarct, which can cause ipsilateral motor weakness. Radiology of left hip and knee completed, no fracture/dislocation, however L hip with severe osteoarthrosis   await MRI brain, C/T/L spine  - TTE pending   - Resume Aspirin 81mg, Atorvastatin 40, titrate to LDL<70, lipid profile reviewed   - Discussed with care with PM&R Dr. Doss: likely a candidate for acute rehab pending further diagnostic tests
CT myelogram with some evidence of compression but unclear by what; could be mass, could be disc material  will need outpt MRI for t/l/s spine and NS follow up; no acute intervention at this time  ECHO with LV hypertrophy c/w long standing HTN  cont Aspirin 81mg, Atorvastatin 40, titrate to LDL<70, lipid profile reviewed

## 2021-06-01 NOTE — PROGRESS NOTE ADULT - PROBLEM SELECTOR PLAN 3
- Pt unsure of meds but states she takes pills, no insulin  - CC diet  - BERNABE for now, adjust as needed  - A1c 6.5
- Pt unsure of meds but states she takes pills, no insulin  - CC diet  - BERNABE for now, adjust as needed  - A1c 6.5
- CC diet  - BERNABE for now, adjust as needed  - A1c 6.5
- Pt unsure of meds but states she takes pills, no insulin  - CC diet  - BERNABE for now, adjust as needed  - A1c 6.5
- CC diet  - BERNABE for now, adjust as needed  - A1c 6.5
- Pt unsure of meds but states she takes pills, no insulin  - CC diet  - BERNABE for now, adjust as needed  - A1c 6.5
- Pt unsure of meds but states she takes pills, no insulin  - CC diet  - BERNABE for now, adjust as needed  - Check A1c
- Pt unsure of meds but states she takes pills, no insulin  - CC diet  - BERNABE for now, adjust as needed  - A1c 6.5
- Pt unsure of meds but states she takes pills, no insulin  - CC diet  - BERNABE for now, adjust as needed  - A1c 6.5
- CC diet  - BERNABE for now, adjust as needed  - A1c 6.5
- Pt unsure of meds but states she takes pills, no insulin  - CC diet  - BERNABE for now, adjust as needed  - A1c 6.5

## 2021-06-01 NOTE — PROGRESS NOTE ADULT - PROBLEM SELECTOR PLAN 2
Currently BP controlled.  cont procardia 90, HCTZ.  cont clonidine  cont cont to titrate BP meds as outpt for improved BP control; will hold for for now as Cr has resolved
- Per pt, her BP is typically elevated in the 200s/100s.  Her PMD has been making several changes and she is currently on Clonidine and Hydralazine at home.  Pt known to be nonadherent to her medications, has also declined evaluation for bariatric surgery.   -inc procardia to 90, monitor BP  - Check aldosterone/renin levels (renin still pending); will consider renal consult if BP still not improved
cont procardia 90  cont clonidine  BP elevated last PM, will cont to monitor for further med adjustments  slight further inc in Cr, likely hemodynamically medicated with BP control; trial of IVF  (has not yet reached level of SHANTEL)
- Currently BP controlled.  - cont procardia 90, HCTZ.  - cont clonidine  - cont cont to titrate BP meds as outpt for improved BP control; will hold for for now as Cr has resolved
- Per pt, her BP is typically elevated in the 200s/100s.  Her PMD has been making several changes and she is currently on Clonidine and Hydralazine at home.  Pt known to be nonadherent to her medications, has also declined evaluation for bariatric surgery.   - Increased to 50mg, dc Hydralazine and Clonidine and switch to Procardia 60mg with close BP monitoring.  Ideally would like to keep pt on once daily meds for better adherence   - Plan for target SBP ~160-180  - Check aldosterone/renin levels (renin still pending); will consider renal consult if BP still not improved  - Still have room to uptitrate on Procardia, but can also consider adding ACE/ARB
cont procardia 90  cont clonidine  BP elevated last PM, will cont to monitor for further med adjustments  mild bump in Cr, likely hemodynamically mediated; monitor for estrada
Currently BP controlled.  cont procardia 90, HCTZ.  cont clonidine  cont cont to titrate BP meds as outpt for improved BP control; will hold for for now as Cr has resolved
- Per pt, her BP is typically elevated in the 200s/100s.  Her PMD has been making several changes and she is currently on Clonidine and Hydralazine at home   - Will initiate HCTZ today, resume Hydralazine at current dose  - Will discuss with PMD what medications she has been on in the past as it's unusual to be on Clonidine as initial BP management  - Plan for target SBP ~180
- Per pt, her BP is typically elevated in the 200s/100s.  Her PMD has been making several changes and she is currently on Clonidine and Hydralazine at home.  Pt known to be nonadherent to her medications, has also declined evaluation for bariatric surgery.   - Resume HCTZ, dc Hydralazine and Clonidine and switch to Procardia 30mg with close BP monitoring.  Ideally would like to keep pt on once daily meds for better adherence   - Plan for target SBP ~160-180
- Per pt, her BP is typically elevated in the 200s/100s.  Her PMD has been making several changes and she is currently on Clonidine and Hydralazine at home.  Pt known to be nonadherent to her medications, has also declined evaluation for bariatric surgery.   -inc procardia to 90, monitor BP  - renin and aldosterone within normal limits  need more data about BP after procardia increased to determine further management, asked for repeat vitals
cont procardia 90, monitor BP  - renin and aldosterone within normal limits  clonidine added for better BP control; pt with ho difficult to control BP and poor medication compliance  improving now
- Per pt, her BP is typically elevated in the 200s/100s.  Her PMD has been making several changes and she is currently on Clonidine and Hydralazine at home.  Pt known to be nonadherent to her medications, has also declined evaluation for bariatric surgery.   - Increase HCTZ today to 50mg, dc Hydralazine and Clonidine and switch to Procardia 30mg with close BP monitoring.  Will increase Procardia to 60mg daily. Ideally would like to keep pt on once daily meds for better adherence   - Plan for target SBP ~160-180  - Check aldosterone/renin levels in AM; will consider renal consult if BP still not improved  - Hold ACE/ARB prior to checking [place]/renin levels
- Per pt, her BP is typically elevated in the 200s/100s.  Her PMD has been making several changes and she is currently on Clonidine and Hydralazine at home.  Pt known to be nonadherent to her medications, has also declined evaluation for bariatric surgery.   -inc procardia to 90, monitor BP  - renin and aldosterone within normal limits  clonidine added for better BP control; pt with ho difficult to control BP and poor medication compliance  improving now
cont procardia 90  cont clonidine  cont cont to titrate BP meds as outpt for improved BP control; will hold for for now as Cr has resolved
cont procardia 90  cont clonidine
cont procardia 90, HCTZ.  cont clonidine  cont cont to titrate BP meds as outpt for improved BP control; will hold for for now as Cr has resolved

## 2021-06-01 NOTE — PROGRESS NOTE ADULT - ASSESSMENT
60 year old moribdily obese RH  AA female with a past medical history of morbid obesity, HTN, DM, CAD who presented to the ED for the 2nd time today because of L leg weakness. r/o stroke but lower suspicion. no incontinence. CTH with left thalamic old stroke. severe L hip atrhosis, CTA H/N unremarkable. . low suspicion for new stroke.  possible spine disease. MR attempted but incomplete A1c 6.3, CT L spine with DJD  - Aspirin 81mg PO daily and Atorvastatin 40, titrate to LDL<70 for secondary stroke prevention.   - MRI brain C and L spine w/o contrast if able to perform due to patient's weight --> attempted but unable. try outpt open MRI if can't inpatient   - Telemonitoring;  Neurochecks and Vital signs per unit protocol  - pain management  - PT/OT/SS/SLP, OOBC--> AR  - check FS, glucose control <180  - GI/DVT ppx  - Counseling on diet, exercise, and medication adherence was done  - Counseling on smoking cessation and alcohol consumption offered when appropriate.  - Pain assessed and judicious use of narcotics when appropriate was discussed.    - Stroke education given when appropriate.  - Importance of fall prevention discussed.   - Differential diagnosis and plan of care discussed with patient and/or family and primary team  - Thank you for allowing me to participate in the care of this patient. Call with questions.   - AR when able   Erik Foster MD  Vascular Neurology
60 year old moribdily obese RH  AA female with a past medical history of morbid obesity, HTN, DM, CAD who presented to the ED for the 2nd time today because of L leg weakness. r/o stroke but lower suspicion. no incontinence. CTH with left thalamic old stroke. severe L hip atrhosis, CTA H/N unremarkable. . low suspicion for new stroke.  possible spine disease. MR attempted but incomplete A1c 6.3, CT L spine with DJD, LLE 1-2/5 today   - Aspirin 81mg PO daily and Atorvastatin 40, titrate to LDL<70 for secondary stroke prevention.   - awaiting CT Myelogram vs MR today.   - MRI brain C and L spine w/o contrast if able to perform due to patient's weight --> attempted but unable. try outpt open MRI if can't inpatient.   - Telemonitoring;  Neurochecks and Vital signs per unit protocol  - pain management  - PT/OT/SS/SLP, OOBC--> AR  - check FS, glucose control <180  - GI/DVT ppx  - Counseling on diet, exercise, and medication adherence was done  - Counseling on smoking cessation and alcohol consumption offered when appropriate.  - Pain assessed and judicious use of narcotics when appropriate was discussed.    - Stroke education given when appropriate.  - Importance of fall prevention discussed.   - Differential diagnosis and plan of care discussed with patient and/or family and primary team  - Thank you for allowing me to participate in the care of this patient. Call with questions.   - AR when able   Erik Foster MD  Vascular Neurology
60 year old moribdily obese RH  AA female with a past medical history of morbid obesity, HTN, DM, CAD who presented to the ED for the 2nd time today because of L leg weakness. r/o stroke but lower suspicion. no incontinence. CTH with left thalamic old stroke. severe L hip atrhosis, CTA H/N unremarkable. . low suspicion for new stroke.  possible spine disease. MR attempted but incomplete A1c 6.3, CT L spine with DJD, LLE 1-2/5 today   - Aspirin 81mg PO daily and Atorvastatin 40, titrate to LDL<70 for secondary stroke prevention.   - awaiting CT Myelogram vs MR today.   - MRI brain C and L spine w/o contrast if able to perform due to patient's weight --> attempted but unable. try outpt open MRI if can't inpatient.   - Telemonitoring;  Neurochecks and Vital signs per unit protocol  - pain management  - PT/OT/SS/SLP, OOBC--> AR  - check FS, glucose control <180  - GI/DVT ppx  - Counseling on diet, exercise, and medication adherence was done  - Counseling on smoking cessation and alcohol consumption offered when appropriate.  - Pain assessed and judicious use of narcotics when appropriate was discussed.    - Stroke education given when appropriate.  - Importance of fall prevention discussed.   - Differential diagnosis and plan of care discussed with patient and/or family and primary team  - Thank you for allowing me to participate in the care of this patient. Call with questions.   - AR when able   Erik Foster MD  Vascular Neurology
60F with PMHx of Hypertension and DM2, severe osteoarthritis on hips and B/L Carpel Tunnel.  She was seen, treated and discharged from the ER at [facility] 5/16 for c/o a few months of leg weakness and  left knee pain (5/16/21) and upon walking up steps at home, on the 4th step her left leg went totally weak and she sat on the step.  She denies a true fall or hurting her hip, head or any other body part.  She denies associated dizziness, back, hip, knee or ankle pain prior to the left leg weakness. She came back to the ER for repeat evaluation and currently now unable to move her LLE and LUE. LUE however is now improving. 
60F with PMHx of Hypertension and DM2, severe osteoarthritis on hips and B/L Carpel Tunnel.  She was seen, treated and discharged from the ER at [facility] 5/16 for c/o a few months of leg weakness and  left knee pain (5/16/21) and upon walking up steps at home, on the 4th step her left leg went totally weak and she sat on the step.  She denies a true fall or hurting her hip, head or any other body part.  She denies associated dizziness, back, hip, knee or ankle pain prior to the left leg weakness. She came back to the ER for repeat evaluation and currently now unable to move her LLE and LUE. LUE however is now improving; now starting to have some movement in LLE. 
60F with PMHx of Hypertension and DM2, severe osteoarthritis on hips and B/L Carpel Tunnel.  She was seen, treated and discharged from the ER at [facility] 5/16 for c/o a few months of leg weakness and  left knee pain (5/16/21) and upon walking up steps at home, on the 4th step her left leg went totally weak and she sat on the step.  She denies a true fall or hurting her hip, head or any other body part.  She denies associated dizziness, back, hip, knee or ankle pain prior to the left leg weakness. She came back to the ER for repeat evaluation and currently now unable to move her LLE and LUE. LUE however is now improving; now starting to have some movement in LLE. 
60 year old moribdily obese RH  AA female with a past medical history of morbid obesity, HTN, DM, CAD who presented to the ED for the 2nd time today because of L leg weakness. r/o stroke but lower suspicion. no incontinence. CTH with left thalamic old stroke. severe L hip atrhosis, CTA H/N unremarkable. . low suspicion for stroke.  possible spine disease. MR attempted but incomplete A1c 6.3  - Aspirin 81mg PO daily and Atorvastatin 40, titrate to LDL<70 for secondary stroke prevention.   - MRI brain C and L spine w/o contrast if able to perform due to patient's weight --> attempted but unable. consider CT C and L spine   - Telemonitoring;  Neurochecks and Vital signs per unit protocol  - pain management  - PT/OT/SS/SLP, OOBC--> AR  - check FS, glucose control <180  - GI/DVT ppx  - Counseling on diet, exercise, and medication adherence was done  - Counseling on smoking cessation and alcohol consumption offered when appropriate.  - Pain assessed and judicious use of narcotics when appropriate was discussed.    - Stroke education given when appropriate.  - Importance of fall prevention discussed.   - Differential diagnosis and plan of care discussed with patient and/or family and primary team  - Thank you for allowing me to participate in the care of this patient. Call with questions.   - AR when able after imaging   Erik Foster MD  Vascular Neurology
60F with PMHx of Hypertension and DM2, severe osteoarthritis on hips and B/L Carpel Tunnel.  She was seen, treated and discharged from the ER at [facility] 5/16 for c/o a few months of leg weakness and  left knee pain (5/16/21) and upon walking up steps at home, on the 4th step her left leg went totally weak and she sat on the step.  She denies a true fall or hurting her hip, head or any other body part.  She denies associated dizziness, back, hip, knee or ankle pain prior to the left leg weakness. She came back to the ER for repeat evaluation and currently now unable to move her LLE and LUE. LUE however is now improving. 
60 year old moribdily obese RH  AA female with a past medical history of morbid obesity, HTN, DM, CAD who presented to the ED for the 2nd time today because of L leg weakness. r/o stroke but lower suspicion. no incontinence. CTH with left thalamic old stroke. severe L hip atrhosis, CTA H/N unremarkable. . low suspicion for new stroke.  possible spine disease. MR attempted but incomplete A1c 6.3, CT L spine with DJD,  CT myelogram with L3/4 and L4/5 mass/disc. LLE improving 2-3/5 today   - f/u neurosx, if no intervention d/c planning to rehab   - Aspirin 81mg PO daily and Atorvastatin 40, titrate to LDL<70 for secondary stroke prevention.   - nsx f/u  - MRI brain C and L spine w/o contrast if able to perform due to patient's weight --> attempted but unable. try outpt open MRI if can't inpatient.   - Telemonitoring;  Neurochecks and Vital signs per unit protocol  - pain management  - PT/OT/SS/SLP, OOBC--> AR  - check FS, glucose control <180  - GI/DVT ppx  - Counseling on diet, exercise, and medication adherence was done  - Counseling on smoking cessation and alcohol consumption offered when appropriate.  - Pain assessed and judicious use of narcotics when appropriate was discussed.    - Stroke education given when appropriate.  - Importance of fall prevention discussed.   - Differential diagnosis and plan of care discussed with patient and/or family and primary team  - Thank you for allowing me to participate in the care of this patient. Call with questions.   - AR when able   Erik Foster MD  Vascular Neurology
60 year old moribdily obese RH  AA female with a past medical history of morbid obesity, HTN, DM, CAD who presented to the ED for the 2nd time today because of L leg weakness. r/o stroke but lower suspicion. no incontinence. CTH with left thalamic old stroke. severe L hip atrhosis, CTA H/N unremarkable. . low suspicion for new stroke.  possible spine disease. MR attempted but incomplete A1c 6.3, CT L spine with DJD,  CT myelogram with L3/4 and L4/5 mass/disc. LLE improving 2-3/5 today   - f/u neurosx--> no intervention d/c planning to rehab   - Aspirin 81mg PO daily and Atorvastatin 40, titrate to LDL<70 for secondary stroke prevention.   - nsx f/u  - MRI brain C and L spine w/o contrast if able to perform due to patient's weight --> attempted but unable. try outpt open MRI if can't inpatient.   - Telemonitoring;  Neurochecks and Vital signs per unit protocol  - pain management  - PT/OT/SS/SLP, OOBC--> AR  - check FS, glucose control <180  - GI/DVT ppx  - Counseling on diet, exercise, and medication adherence was done  - Counseling on smoking cessation and alcohol consumption offered when appropriate.  - Pain assessed and judicious use of narcotics when appropriate was discussed.    - Stroke education given when appropriate.  - Importance of fall prevention discussed.   - Differential diagnosis and plan of care discussed with patient and/or family and primary team  - Thank you for allowing me to participate in the care of this patient. Call with questions.   - AR when able d/c planning   Erik Foster MD  Vascular Neurology
60 year old moribdily obese RH  AA female with a past medical history of morbid obesity, HTN, DM, CAD who presented to the ED for the 2nd time today because of L leg weakness. r/o stroke but lower suspicion. no incontinence. CTH with left thalamic old stroke. severe L hip atrhosis, CTA H/N unremarkable. . low suspicion for new stroke.  possible spine disease. MR attempted but incomplete A1c 6.3, CT L spine with DJD,  CT myelogram with L3/4 and L4/5 mass/disc. LLE improving 2-3/5 today getting better per patient   - f/u neurosx--> no intervention d/c planning to rehab   - Aspirin 81mg PO daily and Atorvastatin 40, titrate to LDL<70 for secondary stroke prevention.   - nsx f/u  - MRI brain C and L spine w/o contrast if able to perform due to patient's weight --> attempted but unable. try outpt open MRI if can't inpatient.   - Telemonitoring;  Neurochecks and Vital signs per unit protocol  - pain management  - PT/OT/SS/SLP, OOBC--> AR  - check FS, glucose control <180  - GI/DVT ppx  - Counseling on diet, exercise, and medication adherence was done  - Counseling on smoking cessation and alcohol consumption offered when appropriate.  - Pain assessed and judicious use of narcotics when appropriate was discussed.    - Stroke education given when appropriate.  - Importance of fall prevention discussed.   - Differential diagnosis and plan of care discussed with patient and/or family and primary team  - Thank you for allowing me to participate in the care of this patient. Call with questions.   - AR when able d/c planning   Erik Foster MD  Vascular Neurology
60 year old moribdily obese RH  AA female with a past medical history of morbid obesity, HTN, DM, CAD who presented to the ED for the 2nd time today because of L leg weakness. r/o stroke but lower suspicion. no incontinence. CTH with left thalamic old stroke. severe L hip atrhosis, CTA H/N unremarkable. . low suspicion for stroke.  possible spine disease.   - Aspirin 81mg PO daily and Atorvastatin 40, titrate to LDL<70 for secondary stroke prevention.   - MRI brain w/o contrast if able to perform due to patient's weight  -  MRI C-spine w/o contrast, if unremarakble would obtain L spine.   - TTE and telemetry   - HbA1C   - Telemonitoring;  Neurochecks and Vital signs per unit protocol  - pain management  - PT/OT/SS/SLP, OOBC  - check FS, glucose control <180  - GI/DVT ppx  - Counseling on diet, exercise, and medication adherence was done  - Counseling on smoking cessation and alcohol consumption offered when appropriate.  - Pain assessed and judicious use of narcotics when appropriate was discussed.    - Stroke education given when appropriate.  - Importance of fall prevention discussed.   - Differential diagnosis and plan of care discussed with patient and/or family and primary team  - Thank you for allowing me to participate in the care of this patient. Call with questions.   Erik Foster MD  Vascular Neurology
60 year old moribdily obese RH  AA female with a past medical history of morbid obesity, HTN, DM, CAD who presented to the ED for the 2nd time today because of L leg weakness. r/o stroke but lower suspicion. no incontinence. CTH with left thalamic old stroke. severe L hip atrhosis, CTA H/N unremarkable. . low suspicion for stroke.  possible spine disease. MR attempted but incomplete A1c 6.3  - Aspirin 81mg PO daily and Atorvastatin 40, titrate to LDL<70 for secondary stroke prevention.   - MRI brain C and L spine w/o contrast if able to perform due to patient's weight --> attempted but unable. consider CT C and L spine   - Telemonitoring;  Neurochecks and Vital signs per unit protocol  - pain management  - PT/OT/SS/SLP, OOBC--> AR  - check FS, glucose control <180  - GI/DVT ppx  - Counseling on diet, exercise, and medication adherence was done  - Counseling on smoking cessation and alcohol consumption offered when appropriate.  - Pain assessed and judicious use of narcotics when appropriate was discussed.    - Stroke education given when appropriate.  - Importance of fall prevention discussed.   - Differential diagnosis and plan of care discussed with patient and/or family and primary team  - Thank you for allowing me to participate in the care of this patient. Call with questions.   Erik Foster MD  Vascular Neurology
60F with PMHx of Hypertension and DM2, severe osteoarthritis on hips and B/L Carpel Tunnel.  She was seen, treated and discharged from the ER at [facility] 5/16 for c/o a few months of leg weakness and  left knee pain (5/16/21) and upon walking up steps at home, on the 4th step her left leg went totally weak and she sat on the step.  She denies a true fall or hurting her hip, head or any other body part.  She denies associated dizziness, back, hip, knee or ankle pain prior to the left leg weakness. She came back to the ER for repeat evaluation and currently now unable to move her LLE and LUE. LUE however is now improving. 
60F with PMHx of Hypertension and DM2, severe osteoarthritis on hips and B/L Carpel Tunnel.  She was seen, treated and discharged from the ER at [facility] 5/16 for c/o a few months of leg weakness and  left knee pain (5/16/21) and upon walking up steps at home, on the 4th step her left leg went totally weak and she sat on the step.  She denies a true fall or hurting her hip, head or any other body part.  She denies associated dizziness, back, hip, knee or ankle pain prior to the left leg weakness. She came back to the ER for repeat evaluation and currently now unable to move her LLE and LUE. LUE however is now improving. 
60 year old moribdily obese RH  AA female with a past medical history of morbid obesity, HTN, DM, CAD who presented to the ED for the 2nd time today because of L leg weakness. r/o stroke but lower suspicion. no incontinence. CTH with left thalamic old stroke. severe L hip atrhosis, CTA H/N unremarkable. . low suspicion for new stroke.  possible spine disease. MR attempted but incomplete A1c 6.3, CT L spine with DJD,  CT myelogram with L3/4 and L4/5 mass/disc. LLE improving 2-3/5 today   - f/u neurosx--> no intervention d/c planning to rehab   - Aspirin 81mg PO daily and Atorvastatin 40, titrate to LDL<70 for secondary stroke prevention.   - nsx f/u  - MRI brain C and L spine w/o contrast if able to perform due to patient's weight --> attempted but unable. try outpt open MRI if can't inpatient.   - Telemonitoring;  Neurochecks and Vital signs per unit protocol  - pain management  - PT/OT/SS/SLP, OOBC--> AR  - check FS, glucose control <180  - GI/DVT ppx  - Counseling on diet, exercise, and medication adherence was done  - Counseling on smoking cessation and alcohol consumption offered when appropriate.  - Pain assessed and judicious use of narcotics when appropriate was discussed.    - Stroke education given when appropriate.  - Importance of fall prevention discussed.   - Differential diagnosis and plan of care discussed with patient and/or family and primary team  - Thank you for allowing me to participate in the care of this patient. Call with questions.   - AR when able d/c planning   Erik Foster MD  Vascular Neurology
60 year old moribdily obese RH  AA female with a past medical history of morbid obesity, HTN, DM, CAD who presented to the ED for the 2nd time today because of L leg weakness. r/o stroke but lower suspicion. no incontinence. CTH with left thalamic old stroke. severe L hip atrhosis, CTA H/N unremarkable. . low suspicion for new stroke.  possible spine disease. MR attempted but incomplete A1c 6.3, CT L spine with DJD, LLE 1-2/5 today   - Aspirin 81mg PO daily and Atorvastatin 40, titrate to LDL<70 for secondary stroke prevention.   - awaiting CT Myelogram   - nsx f/u  - MRI brain C and L spine w/o contrast if able to perform due to patient's weight --> attempted but unable. try outpt open MRI if can't inpatient.   - Telemonitoring;  Neurochecks and Vital signs per unit protocol  - pain management  - PT/OT/SS/SLP, OOBC--> AR  - check FS, glucose control <180  - GI/DVT ppx  - Counseling on diet, exercise, and medication adherence was done  - Counseling on smoking cessation and alcohol consumption offered when appropriate.  - Pain assessed and judicious use of narcotics when appropriate was discussed.    - Stroke education given when appropriate.  - Importance of fall prevention discussed.   - Differential diagnosis and plan of care discussed with patient and/or family and primary team  - Thank you for allowing me to participate in the care of this patient. Call with questions.   - AR when able   Erik Foster MD  Vascular Neurology
60F with PMHx of Hypertension and DM2, severe osteoarthritis on hips and B/L Carpel Tunnel.  She was seen, treated and discharged from the ER at [facility] 5/16 for c/o a few months of leg weakness and  left knee pain (5/16/21) and upon walking up steps at home, on the 4th step her left leg went totally weak and she sat on the step.  She denies a true fall or hurting her hip, head or any other body part.  She denies associated dizziness, back, hip, knee or ankle pain prior to the left leg weakness. She came back to the ER for repeat evaluation and currently now unable to move her LLE and LUE. LUE however is now improving. 
60F with PMHx of Hypertension and DM2, severe osteoarthritis on hips and B/L Carpel Tunnel.  She was seen, treated and discharged from the ER at [facility] 5/16 for c/o a few months of leg weakness and  left knee pain (5/16/21) and upon walking up steps at home, on the 4th step her left leg went totally weak and she sat on the step.  She denies a true fall or hurting her hip, head or any other body part.  She denies associated dizziness, back, hip, knee or ankle pain prior to the left leg weakness. She came back to the ER for repeat evaluation and currently now unable to move her LLE and LUE. LUE however is now improving. 
60F with PMHx of Hypertension and DM2, severe osteoarthritis on hips and B/L Carpel Tunnel.  She was seen, treated and discharged from the ER at [facility] 5/16 for c/o a few months of leg weakness and  left knee pain (5/16/21) and upon walking up steps at home, on the 4th step her left leg went totally weak and she sat on the step.  She denies a true fall or hurting her hip, head or any other body part.  She denies associated dizziness, back, hip, knee or ankle pain prior to the left leg weakness. She came back to the ER for repeat evaluation and currently now unable to move her LLE and LUE. LUE however is now improving. 
60F with PMHx of Hypertension and DM2, severe osteoarthritis on hips and B/L Carpel Tunnel.  She was seen, treated and discharged from the ER at [facility] 5/16 for c/o a few months of leg weakness and  left knee pain (5/16/21) and upon walking up steps at home, on the 4th step her left leg went totally weak and she sat on the step.  She denies a true fall or hurting her hip, head or any other body part.  She denies associated dizziness, back, hip, knee or ankle pain prior to the left leg weakness. She came back to the ER for repeat evaluation and currently now unable to move her LLE and LUE. 
60F with PMHx of Hypertension and DM2, severe osteoarthritis on hips and B/L Carpel Tunnel.  She was seen, treated and discharged from the ER at [facility] 5/16 for c/o a few months of leg weakness and  left knee pain (5/16/21) and upon walking up steps at home, on the 4th step her left leg went totally weak and she sat on the step.  She denies a true fall or hurting her hip, head or any other body part.  She denies associated dizziness, back, hip, knee or ankle pain prior to the left leg weakness. She came back to the ER for repeat evaluation and currently now unable to move her LLE and LUE. LUE however is now improving; now starting to have some movement in LLE. 
60F with PMHx of Hypertension and DM2, severe osteoarthritis on hips and B/L Carpel Tunnel.  She was seen, treated and discharged from the ER at [facility] 5/16 for c/o a few months of leg weakness and  left knee pain (5/16/21) and upon walking up steps at home, on the 4th step her left leg went totally weak and she sat on the step.  She denies a true fall or hurting her hip, head or any other body part.  She denies associated dizziness, back, hip, knee or ankle pain prior to the left leg weakness. She came back to the ER for repeat evaluation and currently now unable to move her LLE and LUE. LUE however is now improving; now starting to have some movement in LLE. 
60F with PMHx of Hypertension and DM2, severe osteoarthritis on hips and B/L Carpel Tunnel.  She was seen, treated and discharged from the ER at [facility] 5/16 for c/o a few months of leg weakness and  left knee pain (5/16/21) and upon walking up steps at home, on the 4th step her left leg went totally weak and she sat on the step.  She denies a true fall or hurting her hip, head or any other body part.  She denies associated dizziness, back, hip, knee or ankle pain prior to the left leg weakness. She came back to the ER for repeat evaluation and currently now unable to move her LLE and LUE. LUE however is now improving; now starting to have some movement in LLE. 
60F with PMHx of Hypertension and DM2, severe osteoarthritis on hips and B/L Carpel Tunnel.  She was seen, treated and discharged from the ER at [facility] 5/16 for c/o a few months of leg weakness and  left knee pain (5/16/21) and upon walking up steps at home, on the 4th step her left leg went totally weak and she sat on the step.  She denies a true fall or hurting her hip, head or any other body part.  She denies associated dizziness, back, hip, knee or ankle pain prior to the left leg weakness. She came back to the ER for repeat evaluation and currently now unable to move her LLE and LUE. LUE however is now improving; now starting to have some movement in LLE. 
60F with PMHx of Hypertension and DM2, severe osteoarthritis on hips and B/L Carpel Tunnel.  She was seen, treated and discharged from the ER at [facility] 5/16 for c/o a few months of leg weakness and  left knee pain (5/16/21) and upon walking up steps at home, on the 4th step her left leg went totally weak and she sat on the step.  She denies a true fall or hurting her hip, head or any other body part.  She denies associated dizziness, back, hip, knee or ankle pain prior to the left leg weakness. She came back to the ER for repeat evaluation and currently now unable to move her LLE and LUE. LUE however is now improving; now starting to have some movement in LLE. 
60F with PMHx of Hypertension and DM2, severe osteoarthritis on hips and B/L Carpel Tunnel.  She was seen, treated and discharged from the ER at [facility] 5/16 for c/o a few months of leg weakness and  left knee pain (5/16/21) and upon walking up steps at home, on the 4th step her left leg went totally weak and she sat on the step.  She denies a true fall or hurting her hip, head or any other body part.  She denies associated dizziness, back, hip, knee or ankle pain prior to the left leg weakness. She came back to the ER for repeat evaluation and currently now unable to move her LLE and LUE.

## 2021-06-01 NOTE — PROGRESS NOTE ADULT - SUBJECTIVE AND OBJECTIVE BOX
Neurology Progress Note    S: Patient seen and examined. No new events overnight. patient denied CP, SOB, HA or pain. PMR rec AR.   CT L spine obtained with LUPILLOD. CT myelogram with L3/4 and L4/5 mass/disc. LLE improving d/c planning       Medication:  MEDICATIONS  (STANDING):  aspirin enteric coated 81 milliGRAM(s) Oral daily  atorvastatin 40 milliGRAM(s) Oral at bedtime  cloNIDine 0.1 milliGRAM(s) Oral at bedtime  dextrose 40% Gel 15 Gram(s) Oral once  dextrose 5%. 1000 milliLiter(s) (50 mL/Hr) IV Continuous <Continuous>  dextrose 5%. 1000 milliLiter(s) (100 mL/Hr) IV Continuous <Continuous>  dextrose 50% Injectable 25 Gram(s) IV Push once  dextrose 50% Injectable 12.5 Gram(s) IV Push once  dextrose 50% Injectable 25 Gram(s) IV Push once  enoxaparin Injectable 40 milliGRAM(s) SubCutaneous two times a day  fluticasone propionate 50 MICROgram(s)/spray Nasal Spray 1 Spray(s) Both Nostrils two times a day  glucagon  Injectable 1 milliGRAM(s) IntraMuscular once  hydrochlorothiazide 50 milliGRAM(s) Oral <User Schedule>  insulin lispro (ADMELOG) corrective regimen sliding scale   SubCutaneous three times a day before meals  insulin lispro (ADMELOG) corrective regimen sliding scale   SubCutaneous at bedtime  NIFEdipine XL 90 milliGRAM(s) Oral daily  nystatin Powder 1 Application(s) Topical two times a day  polyethylene glycol 3350 17 Gram(s) Oral daily  sodium chloride 0.45%. 1000 milliLiter(s) (75 mL/Hr) IV Continuous <Continuous>  sodium chloride 0.9% lock flush 3 milliLiter(s) IV Push every 8 hours  sodium chloride 0.9%. 1000 milliLiter(s) (75 mL/Hr) IV Continuous <Continuous>    MEDICATIONS  (PRN):  acetaminophen   Tablet .. 650 milliGRAM(s) Oral every 6 hours PRN Mild Pain (1 - 3), Moderate Pain (4 - 6)      Vitals:  Vital Signs Last 24 Hrs  T(C): 36.8 (2021 05:50), Max: 36.8 (31 May 2021 10:20)  T(F): 98.2 (2021 05:50), Max: 98.3 (31 May 2021 10:20)  HR: 67 (2021 05:50) (67 - 90)  BP: 146/90 (2021 05:50) (143/81 - 178/81)  BP(mean): --  RR: 18 (2021 05:50) (17 - 20)  SpO2: 96% (2021 05:50) (94% - 96%)    General:  Constitutional: Obese Female, appears stated age, in no apparent distress including pain  Head: Normocephalic & atraumatic.    Neurological (>12):  MS: Awake, alert, oriented to person, place, situation, time. Normal affect. Follows all commands.    Language: Speech is clear, fluent with good repetition & comprehension.    CNs: PERRLA (R = 3mm, L = 3mm). VF intact in all 4 quadrants, EOMI no nystagmus. Some reduced sensation to pinprick on the L side of the face, intact to LT throughout, well developed masseter muscles b/l. No facial asymmetry b/l, full eye closure strength b/l. Symmetric palate elevation in midline. Shoulder shrug intact b/l. Tongue midline, normal movements, no atrophy.    Motor: Normal muscle bulk & tone RUE. . No noticeable tremor or seizure. Noted L arm drift- improved. LLE 2-3, now 1-2/5. RLE 3-4/5    Sensation: Reduced sensation to pinprick in the distal L leg and somewhat on the lateral thigh. Reduced sensation to pinprick on the distal L arm. Intact to vibratory sense and proprioception throughout.    Reflexes:              Biceps(C5)       BR(C6)     Triceps(C7)               Patellar(L4)    Achilles(S1)    Plantar Resp  R	2	          2	             2		        0		    1		Mute   L	2	          2	             2		        0		    1		Mute     Coordination: No dysmetria to FTN    Gait: Deferred    I personally reviewed the below data/images/labs:    CBC Full  -  ( 2021 08:32 )  WBC Count : 6.58 K/uL  RBC Count : 5.09 M/uL  Hemoglobin : 14.4 g/dL  Hematocrit : 44.8 %  Platelet Count - Automated : 229 K/uL  Mean Cell Volume : 88.0 fL  Mean Cell Hemoglobin : 28.3 pg  Mean Cell Hemoglobin Concentration : 32.1 gm/dL  Auto Neutrophil # : x  Auto Lymphocyte # : x  Auto Monocyte # : x  Auto Eosinophil # : x  Auto Basophil # : x  Auto Neutrophil % : x  Auto Lymphocyte % : x  Auto Monocyte % : x  Auto Eosinophil % : x  Auto Basophil % : x    05-    137  |  102  |  30<H>  ----------------------------<  122<H>  3.6   |  22  |  1.24    Ca    10.8<H>      31 May 2021 08:06  Phos  3.7       Mg     1.8           Urinalysis Basic - ( 17 May 2021 06:51 )    Color: Light Yellow / Appearance: Clear / S.037 / pH: x  Gluc: x / Ketone: Small  / Bili: Negative / Urobili: <2 mg/dL   Blood: x / Protein: Trace / Nitrite: Negative   Leuk Esterase: Negative / RBC: 0 /HPF / WBC 1 /HPF   Sq Epi: x / Non Sq Epi: Occasional / Bacteria: Few      < from: CT Hip No Cont, Left (21 @ 23:06) >    EXAM:  CT HIP ONLY LT            PROCEDURE DATE:  May 16 2021         INTERPRETATION:  HISTORY: Left-sided hip pain.    Helical CT imaging of the left hip was performed without intravenous contrast. Sagittal and coronal reformats were provided. 3-D reformats were performed on a separate workstation.    Correlation is made to prior radiographs from a 2021.    FINDINGS:    There is no evidence of acute fracture or dislocation. There is severe left hip arthrosis with prominent subchondral cystic change along the posterior margin of the articulation. There is mild bilateral sacral iliac joint arthrosis.    There is no subcutaneous or intramuscular hematoma. There is a calcified uterine fibroid. There is colonic diverticulosis.    IMPRESSION:    Severe left hip arthrosis.    No evidence of acute fracture or dislocation.              ANNE VIDALES MD; Attending Radiologist  This document has been electronically signed. May 17 2021  8:50AM    < end of copied text >  < from: CT Angio Head w/ IV Cont (21 @ 23:06) >    EXAM:  CT ANGIO BRAIN (W)AW IC      EXAM:  CT ANGIO NECK (W)AW IC        PROCEDURE DATE:  May 16 2021         INTERPRETATION:  HISTORY: Left lower extremity weakness and slurred speech, for 2-3 days    COMPARISON: No similar prior    TECHNIQUE: Noncontrast CT head and CT angiogram of the neck and brain was performed after administration of intravenous contrast. MIP and 3D reconstructions were performed.    Total of 90 cc Omnipaque 350 intravenous contrast were administered without complication.10 cc discarded.    FINDINGS:    CT HEAD:    No acute intracranial hemorrhage, or CT evidence of acute, large territorial infarct. There is an age-indeterminate lacunar infarct in the left thalamus.. No hydrocephalus. Basilar cisterns are clear. Scalp and imaged midfacial soft tissues are unremarkable. Calvarium is intact.    CTA BRAIN  This study is degraded by venous contamination.    INTERNAL CAROTID ARTERIES:  The intracranial segments of the ICA are patent without hemodynamically significantstenosis, occlusion, or aneurysm. The ICA bifurcations are unremarkable.    ANTERIOR CEREBRAL ARTERIES: No proximal flow-limiting stenosis or occlusion. Anterior communicating artery is unremarkable without aneurysm.    MIDDLE CEREBRAL ARTERIES: No proximal flow-limiting stenosis or occlusion. MCA bifurcations are unremarkable without aneurysm.    POSTERIOR CEREBRAL ARTERIES: No proximal flow-limiting stenosis or occlusion. Posterior communicating artery is well-developed on the right.    VERTEBROBASILAR SYSTEM: The right vertebral artery predominantly terminates in the PICA. The basilar flow is predominantly supplied by the left vertebral artery. The distal left V4 segment demonstrates short segment of attenuated flow, estimated at 50%. The basilar tip is unremarkable    CTA NECK  This study is degraded by combination of streak artifact from the patient's body habitus and shoulder positioning as well and motion.    RIGHT CAROTID SYSTEM: Normal in course and caliber without flow-limiting stenosis or occlusion.    LEFT CAROTID SYSTEM: Normal in course and caliber without flow-limiting stenosis or occlusion.    VERTEBRAL SYSTEM: Very limited evaluation of the origin and majority of the cervical portions of the bilateral vertebral arteries due to the above limitations. The arteries are grossly patent.    AORTIC ARCH: Typical three-vessel arch..    IMPRESSION:    CT HEAD: Age-indeterminate lacunar infarct in the left thalamus. No acute intracranial hemorrhage.    CTA BRAIN: Degraded exam. No evidence of proximal vessel occlusion or flow-limiting stenosis.    CTA NECK: Degraded exam, with essentially nondiagnostic evaluation of the vertebral arteries. No occlusion or flow-limiting stenosis in the carotid arteries.            SINDI ESQUEDA MD, Resident Radiology  This document has been electronically signed.  ASUNCION GARRIDO MD; Attending Radiologist  This document has been electronically signed. May 16 2021 11:51PM    < end of copied text >  < from: MR Head No Cont (21 @ 13:31) >    EXAM:  MR BRAIN        PROCEDURE DATE:  May 18 2021         INTERPRETATION:  CLINICAL STATEMENT: Left-sided weakness    TECHNIQUE: MRI of the brain was performed without gadolinium.    COMPARISON: CT head 2021    FINDINGS:  Incomplete exam dueto technical failure. Only the sagittal and axial T1 sequences obtained.    No midline shift. No gross mass effect. No hydrocephalus. Probable chronic lacunar infarct left thalamus    Nonspecific diffuse low T1 bone marrow signal noted which could berelated to red marrow hyperplasia in the correct clinical setting.    IMPRESSION:  Incomplete exam. Repeat exam recommended.        TATIANA CHESTER MD; Attending Radiologist  This document has been electronically signed. May 18 2021  2:12PM    < end of copied text >  < from: CT Lumbar Spine w/ IV Cont (21 @ 11:44) >    EXAM:  CT LUMBAR SPINE IC        PROCEDURE DATE:  May 20 2021         INTERPRETATION:  Clinical indication: Lower extremity weakness    Multiple axial sections were performed through the lumbar spine. Coronal and sagittal reconstructions were performed as well.    This exam is somewhat limited by motion.    Scoliosis is seen.    Loss of the normal lumbar lordosis is seen.    Schmorl's nodes are seen multiple levels    Disc space narrowing endplate sclerotic change and osteophytes are seen at multiple levels. This is most prominent at the L5-S1 level and secondary to chronic degenerative changes.    Vacuum disc changes seen involving the L3-4 level which is secondary to chronic degenerative change.    T11-12: Normal    T12-L1: Normal    L1-2: Normal    L2-3: Disc bulge and bilateral hypertrophic facet changes are seen. Moderate to severe narrowing of the spinal canal is seen.    L3-4: Disc bulge and bilateral hypertrophic facet joint changes are seen. Moderate to severe narrowing spinal canal is seen. Mild to moderate narrowing of the left neural foramen    L4-5: Disc bulge and bilateral hypertrophic facet changes. Moderate narrowing of the spinal canal. Severe narrowing of the left neural foramen.    L5-S1: Disc bulge and bilateral hypertrophic facet joint changes. Moderate to severe narrowing of the left neural foramen.    Evaluation of the paraspinal soft tissues is limited by lack of IV contrast though grossly normal    Calcified fibroid is seen involving the pelvic region.    There is evidence of an IUD seen in the uterus.    IMPRESSION: Degenerative changes as described above.    MRI can be done for further evaluation if there are no contraindications.              HEVER GOODRICH M.D., ATTENDING RADIOLOGIST  This document has been electronically signed. May 20 2021 11:58AM    < end of copied text >    < from: CT Lumbar Spine No Cont (05. @ 17:33) >    EXAM:  CT LUMBAR SPINE      EXAM:  IR PROCEDURE        PROCEDURE DATE:  May 27 2021         INTERPRETATION:  Clinical indication: Left lower extremity weakness.    The patient was explained the risks benefits alternatives to a fluoroscopic-guided lumbar puncture for a lumbar CT myelogram.    Patient signed consent.    Using sterile technique fluoroscopic guidance a 20-gauge spinal was inserted at the L2-3 level. Approximately 15 cc of Omnipaque 180 was injected under fluoroscopic guidance. Contrast appears to flow well.    AP, lateral and bilateral oblique views were obtained.    There appears to be narrowing of the contrast column at the L2-3 L3-4 and L4-5 levels.    The patient was brought to CT scan area and multiple axial sections were performed through the lumbar spine. Coronal and sagittal reconstructions were performed as well    Sagittal and coronal reconstructions were performed as well.    This exam is somewhat limited by patient's body habitus.    L5-S1: Contrast filled thecal sac is seen. No significant, as of the spinal canal or either neural foramen    L4-5: There is paucity of contrast seen in the thecal sac at this level. There is compression of thecal sac by a possible epidural lesion, though it is difficult to be sure given the quality of the study. Bilateral hypertrophic facet joint changes are seen. Significant narrowing of the left neural foramen is seen.    L3-4: There is paucity of contrast seen in the thecal sac at this level as well. There is compressionof thecal sac identified suspected though not as severe as at the L4-5 level. This too is thought to represent an epidural mass but it is difficult to be sure. Bilateral hypertrophic facet joint changes are seen. Significant narrowing of the left neural foramen is seen.    L2-3: Disc bulge and bilateral hypertrophic facet joint changes seen. Moderate narrowing of the contrast filled thecal sac is seen. Diminished enhancement of the left nerve root is identified.    L1-2: Normal contrast filled thecal sac is seen.    T12-L1: Normal contrast filled thecal sac is seen.    Evaluation of the paraspinal soft tissues is limited by lack of IV contrast.    Impression: Paucity of contrast seen at the L3-4 and L4-5 level which is suspicious for an underlying mass. This could be compatible with disc material though the possibly of underlying neoplastic process cannot be entirely excluded.              HEVER WARSHALL M.D., ATTENDING RADIOLOGIST  This document has been electronically signed. May 27 2021  7:08PM    < end of copied text >

## 2021-09-17 ENCOUNTER — APPOINTMENT (OUTPATIENT)
Dept: GYNECOLOGIC ONCOLOGY | Facility: CLINIC | Age: 61
End: 2021-09-17

## 2022-05-26 ENCOUNTER — NON-APPOINTMENT (OUTPATIENT)
Age: 62
End: 2022-05-26

## 2023-05-01 NOTE — PROVIDER CONTACT NOTE (OTHER) - ACTION/TREATMENT ORDERED:
Contacted and notified Selina Diaz. patient ordered 30mg Nifidipine ER. Patient to have daily nifidipine increased to 90mg. Recheck BP in 2 hr None
